# Patient Record
Sex: FEMALE | Race: WHITE | NOT HISPANIC OR LATINO | Employment: OTHER | URBAN - METROPOLITAN AREA
[De-identification: names, ages, dates, MRNs, and addresses within clinical notes are randomized per-mention and may not be internally consistent; named-entity substitution may affect disease eponyms.]

---

## 2017-01-03 ENCOUNTER — OFFICE VISIT (OUTPATIENT)
Dept: ENDOCRINOLOGY | Age: 62
End: 2017-01-03

## 2017-01-03 VITALS
HEIGHT: 61 IN | WEIGHT: 147.2 LBS | SYSTOLIC BLOOD PRESSURE: 112 MMHG | RESPIRATION RATE: 16 BRPM | BODY MASS INDEX: 27.79 KG/M2 | DIASTOLIC BLOOD PRESSURE: 72 MMHG

## 2017-01-03 DIAGNOSIS — IMO0002 UNCONTROLLED TYPE 1 DIABETES MELLITUS WITH OTHER SPECIFIED COMPLICATION: Primary | ICD-10-CM

## 2017-01-03 DIAGNOSIS — G72.3: ICD-10-CM

## 2017-01-03 DIAGNOSIS — E78.5 DYSLIPIDEMIA: ICD-10-CM

## 2017-01-03 DIAGNOSIS — I10 ESSENTIAL HYPERTENSION: ICD-10-CM

## 2017-01-03 DIAGNOSIS — R53.82 CHRONIC FATIGUE: ICD-10-CM

## 2017-01-03 DIAGNOSIS — E55.9 AVITAMINOSIS D: ICD-10-CM

## 2017-01-03 PROCEDURE — 99214 OFFICE O/P EST MOD 30 MIN: CPT | Performed by: INTERNAL MEDICINE

## 2017-01-03 NOTE — LETTER
January 3, 2017     No Known Provider  [facility] [place] [address]    Patient: Dayanna Lewis   YOB: 1955   Date of Visit: 1/3/2017       Dear  Provider:    Thank you for referring Dayanna Lewis to me for evaluation. Below are the relevant portions of my assessment and plan of care.      Diagnoses and all orders for this visit:    Uncontrolled type 1 diabetes mellitus with other specified complication  -     Vitamin D 25 Hydroxy; Future  -     Uric Acid; Future  -     T4 & TSH ([facility]); Future  -     Comprehensive Metabolic Panel; Future  -     Hemoglobin A1c; Future  -     Lipid Panel; Future  -     MicroAlbumin, Urine, Random; Future    Dyslipidemia  -     Vitamin D 25 Hydroxy; Future  -     Uric Acid; Future  -     T4 & TSH ([facility]); Future  -     Comprehensive Metabolic Panel; Future  -     Hemoglobin A1c; Future  -     Lipid Panel; Future  -     MicroAlbumin, Urine, Random; Future    Chronic fatigue  -     Vitamin D 25 Hydroxy; Future  -     Uric Acid; Future  -     T4 & TSH ([facility]); Future  -     Comprehensive Metabolic Panel; Future  -     Hemoglobin A1c; Future  -     Lipid Panel; Future  -     MicroAlbumin, Urine, Random; Future    Essential hypertension  -     Vitamin D 25 Hydroxy; Future  -     Uric Acid; Future  -     T4 & TSH ([facility]); Future  -     Comprehensive Metabolic Panel; Future  -     Hemoglobin A1c; Future  -     Lipid Panel; Future  -     MicroAlbumin, Urine, Random; Future    Avitaminosis D  -     Vitamin D 25 Hydroxy; Future  -     Uric Acid; Future  -     T4 & TSH ([facility]); Future  -     Comprehensive Metabolic Panel; Future  -     Hemoglobin A1c; Future  -     Lipid Panel; Future  -     MicroAlbumin, Urine, Random; Future    Adynamia  -     Vitamin D 25 Hydroxy; Future  -     Uric Acid; Future  -     T4 & TSH ([facility]); Future  -     Comprehensive Metabolic Panel; Future  -     Hemoglobin A1c; Future  -     Lipid Panel; Future  -     MicroAlbumin,  Urine, Random; Future                          If you have questions, please do not hesitate to call me. I look forward to following Dyaanna along with you.         Sincerely,        Donya Kaplan MD        CC: No Recipients

## 2017-01-03 NOTE — PROGRESS NOTES
Subjective   Dayanna Lewis is a 61 y.o. female seen for follow up for DM1, dyslipidemia, HTN, vit d deficiency, lab review. Patient is checking BG 3-4 times a day. She denies any problems or concerns. She would like to discuss taking Bystolic.      History of Present Illness this is a 61-year-old female with type I diabetes as well as hypertension and dyslipidemia with vitamin D deficiency.  Over the course of last 6 months she has had no significant health problems for which to go to the emergency room or hospital.    The following portions of the patient's history were reviewed and updated as appropriate: allergies, current medications, past family history, past medical history, past social history, past surgical history and problem list.    Review of Systems   Constitutional: Negative.    HENT: Negative.    Eyes: Negative.    Respiratory: Negative.    Cardiovascular: Negative.    Gastrointestinal: Negative.    Endocrine: Negative.    Genitourinary: Negative.    Musculoskeletal: Negative.    Skin: Negative.    Allergic/Immunologic: Negative.    Neurological: Negative.    Hematological: Negative.    Psychiatric/Behavioral: Negative.        Objective   Physical Exam   Constitutional: She is oriented to person, place, and time. She appears well-developed and well-nourished. No distress.   HENT:   Head: Normocephalic and atraumatic.   Right Ear: External ear normal.   Left Ear: External ear normal.   Nose: Nose normal.   Mouth/Throat: Oropharynx is clear and moist. No oropharyngeal exudate.   Eyes: Conjunctivae and EOM are normal. Pupils are equal, round, and reactive to light. Right eye exhibits no discharge. Left eye exhibits no discharge. No scleral icterus.   Neck: Normal range of motion. Neck supple. No JVD present. No tracheal deviation present. No thyromegaly present.   Cardiovascular: Normal rate, regular rhythm, normal heart sounds and intact distal pulses.  Exam reveals no gallop and no friction rub.    No  murmur heard.  Pulmonary/Chest: Effort normal and breath sounds normal. No stridor. No respiratory distress. She has no wheezes. She has no rales. She exhibits no tenderness.   Abdominal: Soft. Bowel sounds are normal. She exhibits no distension and no mass. There is no tenderness. There is no rebound and no guarding. No hernia.   Musculoskeletal: Normal range of motion. She exhibits no edema, tenderness or deformity.   Lymphadenopathy:     She has no cervical adenopathy.   Neurological: She is alert and oriented to person, place, and time. She has normal reflexes. She displays normal reflexes. No cranial nerve deficit. She exhibits normal muscle tone. Coordination normal.   Skin: Skin is warm and dry. No rash noted. She is not diaphoretic. No erythema. No pallor.   Psychiatric: She has a normal mood and affect. Her behavior is normal. Judgment and thought content normal.   Nursing note and vitals reviewed.        Assessment/Plan   Diagnoses and all orders for this visit:    Uncontrolled type 1 diabetes mellitus with other specified complication  -     Vitamin D 25 Hydroxy; Future  -     Uric Acid; Future  -     T4 & TSH ([facility]); Future  -     Comprehensive Metabolic Panel; Future  -     Hemoglobin A1c; Future  -     Lipid Panel; Future  -     MicroAlbumin, Urine, Random; Future    Dyslipidemia  -     Vitamin D 25 Hydroxy; Future  -     Uric Acid; Future  -     T4 & TSH ([facility]); Future  -     Comprehensive Metabolic Panel; Future  -     Hemoglobin A1c; Future  -     Lipid Panel; Future  -     MicroAlbumin, Urine, Random; Future    Chronic fatigue  -     Vitamin D 25 Hydroxy; Future  -     Uric Acid; Future  -     T4 & TSH ([facility]); Future  -     Comprehensive Metabolic Panel; Future  -     Hemoglobin A1c; Future  -     Lipid Panel; Future  -     MicroAlbumin, Urine, Random; Future    Essential hypertension  -     Vitamin D 25 Hydroxy; Future  -     Uric Acid; Future  -     T4 & TSH ([facility]); Future  -      Comprehensive Metabolic Panel; Future  -     Hemoglobin A1c; Future  -     Lipid Panel; Future  -     MicroAlbumin, Urine, Random; Future    Avitaminosis D  -     Vitamin D 25 Hydroxy; Future  -     Uric Acid; Future  -     T4 & TSH ([facility]); Future  -     Comprehensive Metabolic Panel; Future  -     Hemoglobin A1c; Future  -     Lipid Panel; Future  -     MicroAlbumin, Urine, Random; Future    Adynamia  -     Vitamin D 25 Hydroxy; Future  -     Uric Acid; Future  -     T4 & TSH ([facility]); Future  -     Comprehensive Metabolic Panel; Future  -     Hemoglobin A1c; Future  -     Lipid Panel; Future  -     MicroAlbumin, Urine, Random; Future               Anus summary I saw and examined this 61-year-old female for above-mentioned problems.  I reviewed her laboratory evaluation of 12/20/2016 and provided her with a hard copy of it.  Overall she is clinically and metabolically stable and therefore we will go ahead and continue all her current prescriptions.  She will see Ms. Pamela Najera in 4 months laboratory evaluation prior to each office visit.  Contrary to her [facility] and her lisinopril which is cheaper than by systolic has no demonstrated superiority over by Bystolic which she is on an as a result she shall remain on it moving forward.

## 2017-01-03 NOTE — LETTER
January 3, 2017     No Known Provider  [facility] [address]    Patient: Dayanna Lewis   YOB: 1955   Date of Visit: 1/3/2017       Dear  Provider:    Dayanna Lewis was in my office today. Below are the relevant portions of my assessment and plan of care.      Diagnoses and all orders for this visit:    Uncontrolled type 1 diabetes mellitus with other specified complication  -     Vitamin D 25 Hydroxy; Future  -     Uric Acid; Future  -     T4 & TSH ([facility]); Future  -     Comprehensive Metabolic Panel; Future  -     Hemoglobin A1c; Future  -     Lipid Panel; Future  -     MicroAlbumin, Urine, Random; Future    Dyslipidemia  -     Vitamin D 25 Hydroxy; Future  -     Uric Acid; Future  -     T4 & TSH ([facility]); Future  -     Comprehensive Metabolic Panel; Future  -     Hemoglobin A1c; Future  -     Lipid Panel; Future  -     MicroAlbumin, Urine, Random; Future    Chronic fatigue  -     Vitamin D 25 Hydroxy; Future  -     Uric Acid; Future  -     T4 & TSH ([facility]); Future  -     Comprehensive Metabolic Panel; Future  -     Hemoglobin A1c; Future  -     Lipid Panel; Future  -     MicroAlbumin, Urine, Random; Future    Essential hypertension  -     Vitamin D 25 Hydroxy; Future  -     Uric Acid; Future  -     T4 & TSH ([facility]); Future  -     Comprehensive Metabolic Panel; Future  -     Hemoglobin A1c; Future  -     Lipid Panel; Future  -     MicroAlbumin, Urine, Random; Future    Avitaminosis D  -     Vitamin D 25 Hydroxy; Future  -     Uric Acid; Future  -     T4 & TSH ([facility]); Future  -     Comprehensive Metabolic Panel; Future  -     Hemoglobin A1c; Future  -     Lipid Panel; Future  -     MicroAlbumin, Urine, Random; Future    Adynamia  -     Vitamin D 25 Hydroxy; Future  -     Uric Acid; Future  -     T4 & TSH ([facility]); Future  -     Comprehensive Metabolic Panel; Future  -     Hemoglobin A1c; Future  -     Lipid Panel; Future  -     MicroAlbumin, Urine, Random;  Future                  If you have questions, please do not hesitate to call me. I look forward to following Dayanna along with you.         Sincerely,        Donya Kaplan MD        CC: No Recipients

## 2017-03-16 ENCOUNTER — TELEPHONE (OUTPATIENT)
Dept: ENDOCRINOLOGY | Age: 62
End: 2017-03-16

## 2017-04-21 ENCOUNTER — LAB (OUTPATIENT)
Dept: ENDOCRINOLOGY | Age: 62
End: 2017-04-21

## 2017-04-21 DIAGNOSIS — IMO0002 UNCONTROLLED TYPE 1 DIABETES MELLITUS WITH OTHER SPECIFIED COMPLICATION: ICD-10-CM

## 2017-04-21 DIAGNOSIS — I10 ESSENTIAL HYPERTENSION: ICD-10-CM

## 2017-04-21 DIAGNOSIS — G72.3: ICD-10-CM

## 2017-04-21 DIAGNOSIS — E55.9 AVITAMINOSIS D: Primary | ICD-10-CM

## 2017-04-21 DIAGNOSIS — R53.82 CHRONIC FATIGUE: ICD-10-CM

## 2017-04-21 DIAGNOSIS — E55.9 AVITAMINOSIS D: ICD-10-CM

## 2017-04-21 DIAGNOSIS — E78.5 DYSLIPIDEMIA: ICD-10-CM

## 2017-04-21 LAB
ALBUMIN SERPL-MCNC: 4.1 G/DL (ref 3.5–5.2)
ALBUMIN/GLOB SERPL: 2 G/DL
ALP SERPL-CCNC: 71 U/L (ref 39–117)
ALT SERPL-CCNC: 20 U/L (ref 1–33)
AST SERPL-CCNC: 25 U/L (ref 1–32)
BILIRUB SERPL-MCNC: 0.4 MG/DL (ref 0.1–1.2)
BUN SERPL-MCNC: 18 MG/DL (ref 8–23)
BUN/CREAT SERPL: 12.9 (ref 7–25)
CALCIUM SERPL-MCNC: 9.5 MG/DL (ref 8.6–10.5)
CHLORIDE SERPL-SCNC: 104 MMOL/L (ref 98–107)
CHOLEST SERPL-MCNC: 130 MG/DL (ref 0–200)
CO2 SERPL-SCNC: 25.3 MMOL/L (ref 22–29)
CREAT SERPL-MCNC: 1.39 MG/DL (ref 0.57–1)
GLOBULIN SER CALC-MCNC: 2.1 GM/DL
GLUCOSE SERPL-MCNC: 124 MG/DL (ref 65–99)
HBA1C MFR BLD: 8.01 % (ref 4.8–5.6)
HDLC SERPL-MCNC: 77 MG/DL (ref 40–60)
LDLC SERPL CALC-MCNC: 37 MG/DL (ref 0–100)
POTASSIUM SERPL-SCNC: 3.6 MMOL/L (ref 3.5–5.2)
PROT SERPL-MCNC: 6.2 G/DL (ref 6–8.5)
SODIUM SERPL-SCNC: 143 MMOL/L (ref 136–145)
T4 SERPL-MCNC: 5.65 MCG/DL (ref 4.5–11.7)
TRIGL SERPL-MCNC: 78 MG/DL (ref 0–150)
TSH SERPL DL<=0.005 MIU/L-ACNC: 2.6 MIU/ML (ref 0.27–4.2)
URATE SERPL-MCNC: 4.4 MG/DL (ref 2.4–5.7)
VLDLC SERPL CALC-MCNC: 15.6 MG/DL (ref 5–40)

## 2017-04-22 LAB — 25(OH)D3+25(OH)D2 SERPL-MCNC: 54.3 NG/ML (ref 30–100)

## 2017-04-27 RX ORDER — NEBIVOLOL HYDROCHLORIDE 10 MG/1
TABLET ORAL
Qty: 90 TABLET | Refills: 0 | Status: SHIPPED | OUTPATIENT
Start: 2017-04-27 | End: 2017-08-02 | Stop reason: SDUPTHER

## 2017-05-05 ENCOUNTER — OFFICE VISIT (OUTPATIENT)
Dept: ENDOCRINOLOGY | Age: 62
End: 2017-05-05

## 2017-05-05 VITALS
DIASTOLIC BLOOD PRESSURE: 68 MMHG | HEIGHT: 62 IN | RESPIRATION RATE: 16 BRPM | SYSTOLIC BLOOD PRESSURE: 114 MMHG | WEIGHT: 150 LBS | BODY MASS INDEX: 27.6 KG/M2

## 2017-05-05 DIAGNOSIS — R53.82 CHRONIC FATIGUE: ICD-10-CM

## 2017-05-05 DIAGNOSIS — IMO0002 UNCONTROLLED TYPE 1 DIABETES MELLITUS WITH OTHER SPECIFIED COMPLICATION: Primary | ICD-10-CM

## 2017-05-05 DIAGNOSIS — I10 ESSENTIAL HYPERTENSION: ICD-10-CM

## 2017-05-05 DIAGNOSIS — E55.9 AVITAMINOSIS D: ICD-10-CM

## 2017-05-05 DIAGNOSIS — N28.9 RENAL INSUFFICIENCY: ICD-10-CM

## 2017-05-05 DIAGNOSIS — Z46.81 INSULIN PUMP TITRATION: ICD-10-CM

## 2017-05-05 DIAGNOSIS — E78.5 DYSLIPIDEMIA: ICD-10-CM

## 2017-05-05 PROCEDURE — 99214 OFFICE O/P EST MOD 30 MIN: CPT | Performed by: NURSE PRACTITIONER

## 2017-05-12 RX ORDER — EZETIMIBE 10 MG/1
TABLET ORAL
Qty: 90 TABLET | Refills: 0 | Status: SHIPPED | OUTPATIENT
Start: 2017-05-12 | End: 2017-09-25

## 2017-05-22 RX ORDER — ROSUVASTATIN CALCIUM 10 MG/1
TABLET, COATED ORAL
Qty: 90 TABLET | Refills: 0 | Status: SHIPPED | OUTPATIENT
Start: 2017-05-22 | End: 2017-08-16 | Stop reason: SDUPTHER

## 2017-06-05 ENCOUNTER — OFFICE VISIT (OUTPATIENT)
Dept: OBSTETRICS AND GYNECOLOGY | Facility: CLINIC | Age: 62
End: 2017-06-05

## 2017-06-05 VITALS
SYSTOLIC BLOOD PRESSURE: 118 MMHG | HEART RATE: 68 BPM | HEIGHT: 62 IN | DIASTOLIC BLOOD PRESSURE: 67 MMHG | BODY MASS INDEX: 27.71 KG/M2 | WEIGHT: 150.6 LBS

## 2017-06-05 DIAGNOSIS — E55.9 AVITAMINOSIS D: ICD-10-CM

## 2017-06-05 DIAGNOSIS — M81.0 OSTEOPOROSIS: ICD-10-CM

## 2017-06-05 DIAGNOSIS — R53.82 CHRONIC FATIGUE: ICD-10-CM

## 2017-06-05 DIAGNOSIS — Z01.419 WELL WOMAN EXAM WITH ROUTINE GYNECOLOGICAL EXAM: ICD-10-CM

## 2017-06-05 DIAGNOSIS — I10 ESSENTIAL HYPERTENSION: ICD-10-CM

## 2017-06-05 DIAGNOSIS — E78.5 DYSLIPIDEMIA: ICD-10-CM

## 2017-06-05 DIAGNOSIS — Z12.31 VISIT FOR SCREENING MAMMOGRAM: ICD-10-CM

## 2017-06-05 DIAGNOSIS — Z46.81 INSULIN PUMP TITRATION: ICD-10-CM

## 2017-06-05 DIAGNOSIS — Z78.0 MENOPAUSE: ICD-10-CM

## 2017-06-05 DIAGNOSIS — Z12.4 ROUTINE CERVICAL SMEAR: Primary | ICD-10-CM

## 2017-06-05 PROCEDURE — 99396 PREV VISIT EST AGE 40-64: CPT | Performed by: OBSTETRICS & GYNECOLOGY

## 2017-06-05 RX ORDER — RALOXIFENE HYDROCHLORIDE 60 MG/1
TABLET, FILM COATED ORAL
Qty: 90 TABLET | Refills: 12 | Status: SHIPPED | OUTPATIENT
Start: 2017-06-05 | End: 2017-09-25 | Stop reason: SDUPTHER

## 2017-06-05 RX ORDER — RALOXIFENE HYDROCHLORIDE 60 MG/1
60 TABLET, FILM COATED ORAL DAILY
Qty: 30 TABLET | Refills: 12 | Status: SHIPPED | OUTPATIENT
Start: 2017-06-05 | End: 2017-06-05 | Stop reason: SDUPTHER

## 2017-06-05 RX ORDER — BUPROPION HYDROCHLORIDE 300 MG/1
TABLET ORAL
Qty: 90 TABLET | Refills: 0 | Status: SHIPPED | OUTPATIENT
Start: 2017-06-05 | End: 2017-09-04 | Stop reason: SDUPTHER

## 2017-06-05 RX ORDER — TOBRAMYCIN 3 MG/ML
SOLUTION/ DROPS OPHTHALMIC
Refills: 0 | COMMUNITY
Start: 2017-06-02 | End: 2018-01-25

## 2017-06-05 NOTE — PROGRESS NOTES
Subjective   Dayanna Lewis is a 61 y.o. female     Cc: Annual and pap    Last annual 2016  Last pap 2013  Last mammogram 2016  Last colonoscopy approximately 8 years ago, no polyps.       History of Present Illness: reports no gyn concerns or problems.  Will schedule mammography.  Doing well with Evista.    The following portions of the patient's history were reviewed and updated as appropriate: allergies, current medications, past family history, past medical history, past social history, past surgical history and problem list.    Review of Systems   Constitutional: Negative for fatigue and fever.   HENT: Negative for congestion.    Eyes: Negative for visual disturbance.   Respiratory: Negative for chest tightness and shortness of breath.    Cardiovascular: Negative for chest pain, palpitations and leg swelling.   Gastrointestinal: Negative for abdominal pain and blood in stool.   Endocrine: Negative for heat intolerance.   Genitourinary: Negative for difficulty urinating, dysuria, frequency, genital sores, hematuria, menstrual problem, pelvic pain, urgency, vaginal bleeding, vaginal discharge and vaginal pain.   Musculoskeletal: Negative for back pain.   Skin: Negative for color change and rash.   Neurological: Negative for syncope and headaches.   Psychiatric/Behavioral: Negative for sleep disturbance.       Past Medical History:   Diagnosis Date   • Dermatomyositis     TYPE 2   • Diabetes mellitus type 1, uncontrolled    • Dyslipidemia    • Hypertension    • Menopausal disorder    • Osteoporosis    • Polymyositis    • Vitamin D deficiency      Past Surgical History:   Procedure Laterality Date   •  SECTION     • INNER EAR SURGERY     • KNEE SURGERY     • OTHER SURGICAL HISTORY      EAR SURGERY     OB History      Para Term  AB TAB SAB Ectopic Multiple Living    2 2        2        Menstrual History:  OB History      Para Term  AB TAB SAB Ectopic  Multiple Living    2 2        2         No LMP recorded (lmp unknown). Patient is postmenopausal.       Family History   Problem Relation Age of Onset   • Depression Mother    • Glaucoma Mother    • Diabetes Father    • Glaucoma Father    • Hypertension Father    • Diabetes Sister    • Thyroid disease Sister    • Diabetes Brother      History   Smoking Status   • Never Smoker   Smokeless Tobacco   • Not on file     History   Alcohol Use No       Objective   Physical Exam   Constitutional: She is oriented to person, place, and time. She appears well-developed and well-nourished.   HENT:   Head: Normocephalic and atraumatic.   Right Ear: External ear normal.   Left Ear: External ear normal.   Nose: Nose normal.   Eyes: EOM are normal. Pupils are equal, round, and reactive to light.   Neck: Normal range of motion. Neck supple. No thyromegaly present.   Cardiovascular: Normal rate, regular rhythm and normal heart sounds.    No murmur heard.  Pulmonary/Chest: Effort normal and breath sounds normal. She has no wheezes. She has no rales. She exhibits no tenderness. Right breast exhibits no inverted nipple, no mass, no nipple discharge, no skin change and no tenderness. Left breast exhibits no inverted nipple, no mass, no nipple discharge, no skin change and no tenderness. There is no breast swelling.   Abdominal: Soft. Bowel sounds are normal. She exhibits no distension and no mass. There is no tenderness. Hernia confirmed negative in the right inguinal area and confirmed negative in the left inguinal area.   Genitourinary: Uterus normal. No breast tenderness. Pelvic exam was performed with patient supine. There is no rash, tenderness or lesion on the right labia. There is no rash, tenderness or lesion on the left labia. Cervix exhibits no motion tenderness, no discharge and no friability. Right adnexum displays no mass and no tenderness. Left adnexum displays no mass and no tenderness. No erythema, tenderness or bleeding  in the vagina. No vaginal discharge found.   Musculoskeletal: Normal range of motion. She exhibits no edema.   Neurological: She is alert and oriented to person, place, and time.   Skin: Skin is warm and dry. No rash noted.   Psychiatric: She has a normal mood and affect. Judgment normal.   Nursing note and vitals reviewed.        Assessment/Plan   Dayanna was seen today for annual exam.    Diagnoses and all orders for this visit:    Routine cervical smear  -     Pap Lb, Rfx HPV ASCU    Essential hypertension  -         Avitaminosis D  -       Dyslipidemia  -         Chronic fatigue  -         Insulin pump titration  -       Well woman exam with routine gynecological exam    Visit for screening mammogram  -     Mammo Screening Bilateral With CAD; Future    Menopause    Osteoporosis  - Continue with Evista 60mg q daily.        Will continue with q o yr WWE's, annual mammography, SBE's, and daily calcium + D

## 2017-06-07 LAB
CONV .: NORMAL
CYTOLOGIST CVX/VAG CYTO: NORMAL
DX ICD CODE: NORMAL
HIV 1 & 2 AB SER-IMP: NORMAL
OTHER STN SPEC: NORMAL
PATH REPORT.FINAL DX SPEC: NORMAL
STAT OF ADQ CVX/VAG CYTO-IMP: NORMAL

## 2017-06-19 RX ORDER — FOLIC ACID 1 MG/1
TABLET ORAL
Qty: 90 TABLET | Refills: 0 | Status: SHIPPED | OUTPATIENT
Start: 2017-06-19 | End: 2017-09-18 | Stop reason: SDUPTHER

## 2017-06-21 RX ORDER — INSULIN GLARGINE 100 [IU]/ML
INJECTION, SOLUTION SUBCUTANEOUS
Qty: 10 ML | Refills: 0 | Status: SHIPPED | OUTPATIENT
Start: 2017-06-21 | End: 2018-10-10 | Stop reason: SDUPTHER

## 2017-06-28 RX ORDER — ERGOCALCIFEROL 1.25 MG/1
CAPSULE ORAL
Qty: 13 CAPSULE | Refills: 0 | Status: SHIPPED | OUTPATIENT
Start: 2017-06-28 | End: 2017-09-25 | Stop reason: SDUPTHER

## 2017-08-02 RX ORDER — NEBIVOLOL HYDROCHLORIDE 10 MG/1
TABLET ORAL
Qty: 90 TABLET | Refills: 0 | Status: SHIPPED | OUTPATIENT
Start: 2017-08-02 | End: 2017-09-25 | Stop reason: SDUPTHER

## 2017-08-15 RX ORDER — EZETIMIBE 10 MG/1
TABLET ORAL
Qty: 90 TABLET | Refills: 0 | Status: SHIPPED | OUTPATIENT
Start: 2017-08-15 | End: 2017-09-25 | Stop reason: SDUPTHER

## 2017-08-16 RX ORDER — ROSUVASTATIN CALCIUM 10 MG/1
TABLET, COATED ORAL
Qty: 90 TABLET | Refills: 0 | Status: SHIPPED | OUTPATIENT
Start: 2017-08-16 | End: 2017-09-25 | Stop reason: SDUPTHER

## 2017-08-22 ENCOUNTER — APPOINTMENT (OUTPATIENT)
Dept: WOMENS IMAGING | Facility: HOSPITAL | Age: 62
End: 2017-08-22

## 2017-08-22 PROCEDURE — G0202 SCR MAMMO BI INCL CAD: HCPCS | Performed by: RADIOLOGY

## 2017-08-22 PROCEDURE — 77063 BREAST TOMOSYNTHESIS BI: CPT | Performed by: RADIOLOGY

## 2017-09-05 DIAGNOSIS — E55.9 VITAMIN D DEFICIENCY: ICD-10-CM

## 2017-09-05 DIAGNOSIS — IMO0002 UNCONTROLLED TYPE 1 DIABETES MELLITUS WITH OTHER SPECIFIED COMPLICATION: Primary | ICD-10-CM

## 2017-09-05 PROBLEM — Z78.0 MENOPAUSE: Status: ACTIVE | Noted: 2017-09-05

## 2017-09-05 PROBLEM — E16.2 HYPOGLYCEMIA: Status: ACTIVE | Noted: 2017-09-05

## 2017-09-05 PROBLEM — M81.0 OSTEOPOROSIS: Status: ACTIVE | Noted: 2017-09-05

## 2017-09-05 RX ORDER — BUPROPION HYDROCHLORIDE 300 MG/1
TABLET ORAL
Qty: 90 TABLET | Refills: 0 | Status: SHIPPED | OUTPATIENT
Start: 2017-09-05 | End: 2017-12-18 | Stop reason: SDUPTHER

## 2017-09-12 ENCOUNTER — LAB (OUTPATIENT)
Dept: ENDOCRINOLOGY | Age: 62
End: 2017-09-12

## 2017-09-12 DIAGNOSIS — E55.9 VITAMIN D DEFICIENCY: ICD-10-CM

## 2017-09-12 DIAGNOSIS — IMO0002 UNCONTROLLED TYPE 1 DIABETES MELLITUS WITH OTHER SPECIFIED COMPLICATION: ICD-10-CM

## 2017-09-13 LAB
25(OH)D3+25(OH)D2 SERPL-MCNC: 52.2 NG/ML (ref 30–100)
ALBUMIN SERPL-MCNC: 4 G/DL (ref 3.5–5.2)
ALBUMIN/GLOB SERPL: 1.9 G/DL
ALP SERPL-CCNC: 81 U/L (ref 39–117)
ALT SERPL-CCNC: 23 U/L (ref 1–33)
AST SERPL-CCNC: 26 U/L (ref 1–32)
BILIRUB SERPL-MCNC: 0.3 MG/DL (ref 0.1–1.2)
BUN SERPL-MCNC: 17 MG/DL (ref 8–23)
BUN/CREAT SERPL: 14.4 (ref 7–25)
CALCIUM SERPL-MCNC: 9.3 MG/DL (ref 8.6–10.5)
CHLORIDE SERPL-SCNC: 102 MMOL/L (ref 98–107)
CHOLEST SERPL-MCNC: 122 MG/DL (ref 0–200)
CO2 SERPL-SCNC: 26.7 MMOL/L (ref 22–29)
CREAT SERPL-MCNC: 1.18 MG/DL (ref 0.57–1)
GLOBULIN SER CALC-MCNC: 2.1 GM/DL
GLUCOSE SERPL-MCNC: 217 MG/DL (ref 65–99)
HBA1C MFR BLD: 7.7 % (ref 4.8–5.6)
HDLC SERPL-MCNC: 70 MG/DL (ref 40–60)
LDLC SERPL CALC-MCNC: 31 MG/DL (ref 0–100)
MICROALBUMIN UR-MCNC: <3 UG/ML
POTASSIUM SERPL-SCNC: 4.7 MMOL/L (ref 3.5–5.2)
PROT SERPL-MCNC: 6.1 G/DL (ref 6–8.5)
SODIUM SERPL-SCNC: 141 MMOL/L (ref 136–145)
TRIGL SERPL-MCNC: 103 MG/DL (ref 0–150)
VLDLC SERPL CALC-MCNC: 20.6 MG/DL (ref 5–40)

## 2017-09-18 RX ORDER — FOLIC ACID 1 MG/1
TABLET ORAL
Qty: 90 TABLET | Refills: 0 | Status: SHIPPED | OUTPATIENT
Start: 2017-09-18 | End: 2017-12-18 | Stop reason: SDUPTHER

## 2017-09-25 ENCOUNTER — OFFICE VISIT (OUTPATIENT)
Dept: ENDOCRINOLOGY | Age: 62
End: 2017-09-25

## 2017-09-25 VITALS
DIASTOLIC BLOOD PRESSURE: 70 MMHG | BODY MASS INDEX: 29.22 KG/M2 | WEIGHT: 157.2 LBS | RESPIRATION RATE: 16 BRPM | SYSTOLIC BLOOD PRESSURE: 122 MMHG

## 2017-09-25 DIAGNOSIS — R53.82 CHRONIC FATIGUE: ICD-10-CM

## 2017-09-25 DIAGNOSIS — I10 ESSENTIAL HYPERTENSION: ICD-10-CM

## 2017-09-25 DIAGNOSIS — Z78.0 MENOPAUSE: ICD-10-CM

## 2017-09-25 DIAGNOSIS — Z46.81 INSULIN PUMP TITRATION: ICD-10-CM

## 2017-09-25 DIAGNOSIS — E55.9 AVITAMINOSIS D: ICD-10-CM

## 2017-09-25 DIAGNOSIS — E78.5 DYSLIPIDEMIA: ICD-10-CM

## 2017-09-25 DIAGNOSIS — E55.9 VITAMIN D DEFICIENCY: ICD-10-CM

## 2017-09-25 DIAGNOSIS — IMO0002 UNCONTROLLED TYPE 1 DIABETES MELLITUS WITH OTHER SPECIFIED COMPLICATION: Primary | ICD-10-CM

## 2017-09-25 PROCEDURE — 99215 OFFICE O/P EST HI 40 MIN: CPT | Performed by: INTERNAL MEDICINE

## 2017-09-25 RX ORDER — EZETIMIBE 10 MG/1
10 TABLET ORAL DAILY
Qty: 90 TABLET | Refills: 3 | Status: SHIPPED | OUTPATIENT
Start: 2017-09-25 | End: 2018-01-25 | Stop reason: SDUPTHER

## 2017-09-25 RX ORDER — ERGOCALCIFEROL 1.25 MG/1
50000 CAPSULE ORAL
Qty: 13 CAPSULE | Refills: 3 | Status: SHIPPED | OUTPATIENT
Start: 2017-09-25 | End: 2018-07-25 | Stop reason: SDUPTHER

## 2017-09-25 RX ORDER — ROSUVASTATIN CALCIUM 10 MG/1
10 TABLET, COATED ORAL NIGHTLY
Qty: 90 TABLET | Refills: 3 | Status: SHIPPED | OUTPATIENT
Start: 2017-09-25 | End: 2018-04-25

## 2017-09-25 RX ORDER — NEBIVOLOL HYDROCHLORIDE 10 MG/1
10 TABLET ORAL DAILY
Qty: 90 TABLET | Refills: 3 | Status: SHIPPED | OUTPATIENT
Start: 2017-09-25 | End: 2018-01-25 | Stop reason: SDUPTHER

## 2017-09-25 RX ORDER — RALOXIFENE HYDROCHLORIDE 60 MG/1
60 TABLET, FILM COATED ORAL DAILY
Qty: 90 TABLET | Refills: 3 | Status: SHIPPED | OUTPATIENT
Start: 2017-09-25 | End: 2018-06-29 | Stop reason: SDUPTHER

## 2017-09-25 NOTE — PROGRESS NOTES
Subjective   Dayanna Lewis is a 61 y.o. female seen for follow up for DM1, HTN, dyslipidemia, osteoporosis, vit d deficiency, lab review. Patient is checking BG 3-4 times a day. She is currently using a Medtronic insulin pump. She would like to discuss her BG and having highs and multiple lows. She denies any problems or concerns.     History of Present Illness this is a 61-year-old female known patient with type I diabetes hypertension and dyslipidemia as well as osteoporosis and vitamin D deficiency.  Over the course of last 6 months she has had no significant health problems for which to go to the emergency room or hospital however she is complaining of having multiple low blood sugars followed by highs.    /70  Resp 16  Wt 157 lb 3.2 oz (71.3 kg)  LMP  (LMP Unknown)  BMI 29.22 kg/m2     No Known Allergies    Current Outpatient Prescriptions:   •  acetone, urine, test strip, Use as directed, Disp: 50 each, Rfl: 1  •  aspirin 81 MG tablet, Take 1 tablet by mouth daily., Disp: , Rfl:   •  buPROPion XL (WELLBUTRIN XL) 300 MG 24 hr tablet, TAKE 1 TABLET BY MOUTH EVERY MORNING, Disp: 90 tablet, Rfl: 0  •  BYSTOLIC 10 MG tablet, TAKE 1 TABLET BY MOUTH DAILY, Disp: 90 tablet, Rfl: 0  •  folic acid (FOLVITE) 1 MG tablet, TAKE 1 TABLET BY MOUTH DAILY, Disp: 90 tablet, Rfl: 0  •  glucagon (GLUCAGON EMERGENCY) 1 MG injection, Use as directed, Disp: 2 kit, Rfl: 1  •  glucose blood test strip, Mari Contour Next Test In Vitro Strip; Patient Sig: Mari Contour Next Test In Vitro Strip test 8 times daily; 240; 5; 10-Nov-2014; Active, Disp: , Rfl:   •  hydrochlorothiazide (HYDRODIURIL) 25 MG tablet, TK 1/2 TO 1 T PO QD, Disp: , Rfl: 0  •  insulin glargine (LANTUS) 100 UNIT/ML injection, Inject  under the skin daily. Use as needed, Disp: , Rfl:   •  insulin glulisine (APIDRA) 100 UNIT/ML injection, Inject  under the skin 3 (Three) Times a Day Before Meals., Disp: , Rfl:   •  LANTUS 100 UNIT/ML injection, INJECT 21 UNITS  UNDER THE SKIN EVERY DAY., Disp: 10 mL, Rfl: 0  •  Multiple Minerals-Vitamins (CITRACAL PLUS BONE DENSITY) tablet, Take by mouth., Disp: , Rfl:   •  raloxifene (EVISTA) 60 MG tablet, TAKE 1 TABLET BY MOUTH DAILY, Disp: 90 tablet, Rfl: 12  •  rosuvastatin (CRESTOR) 10 MG tablet, TAKE 1 TABLET BY MOUTH DAILY, Disp: 90 tablet, Rfl: 0  •  tobramycin 0.3 % solution ophthalmic solution, INT 2 GTS IN OU QID, Disp: , Rfl: 0  •  vitamin D (ERGOCALCIFEROL) 84523 UNITS capsule capsule, TAKE 1 CAPSULE BY MOUTH EVERY 7 DAYS, Disp: 13 capsule, Rfl: 0  •  vitamin E 400 UNIT capsule, Take 1 capsule by mouth daily., Disp: , Rfl:   •  ZETIA 10 MG tablet, TAKE 1 TABLET BY MOUTH EVERY DAY., Disp: 90 tablet, Rfl: 0  •  ZETIA 10 MG tablet, TAKE 1 TABLET BY MOUTH EVERY DAY., Disp: 90 tablet, Rfl: 0      The following portions of the patient's history were reviewed and updated as appropriate: allergies, current medications, past family history, past medical history, past social history, past surgical history and problem list.    Review of Systems   Constitutional: Negative.    HENT: Negative.    Eyes: Negative.    Respiratory: Negative.    Cardiovascular: Negative.    Gastrointestinal: Negative.    Endocrine: Negative.    Genitourinary: Negative.    Musculoskeletal: Negative.    Skin: Negative.    Allergic/Immunologic: Negative.    Neurological: Negative.    Hematological: Negative.    Psychiatric/Behavioral: Negative.        Objective   Physical Exam   Constitutional: She is oriented to person, place, and time. She appears well-developed and well-nourished. No distress.   HENT:   Head: Normocephalic and atraumatic.   Right Ear: External ear normal.   Left Ear: External ear normal.   Nose: Nose normal.   Mouth/Throat: Oropharynx is clear and moist. No oropharyngeal exudate.   Eyes: Conjunctivae and EOM are normal. Pupils are equal, round, and reactive to light. Right eye exhibits no discharge. Left eye exhibits no discharge. No scleral  icterus.   Neck: Normal range of motion. Neck supple. No JVD present. No tracheal deviation present. No thyromegaly present.   Cardiovascular: Normal rate, regular rhythm, normal heart sounds and intact distal pulses.  Exam reveals no gallop and no friction rub.    No murmur heard.  Pulmonary/Chest: Effort normal and breath sounds normal. No stridor. No respiratory distress. She has no wheezes. She has no rales. She exhibits no tenderness.   Abdominal: Soft. Bowel sounds are normal. She exhibits no distension and no mass. There is no tenderness. There is no rebound and no guarding. No hernia.   Musculoskeletal: Normal range of motion. She exhibits no edema, tenderness or deformity.   Lymphadenopathy:     She has no cervical adenopathy.   Neurological: She is alert and oriented to person, place, and time. She has normal reflexes. She displays normal reflexes. No cranial nerve deficit. She exhibits normal muscle tone. Coordination normal.   Skin: Skin is warm and dry. No rash noted. She is not diaphoretic. No erythema. No pallor.   Psychiatric: She has a normal mood and affect. Her behavior is normal. Judgment and thought content normal.   Nursing note and vitals reviewed.    Results for orders placed or performed in visit on 09/12/17   Comprehensive Metabolic Panel   Result Value Ref Range    Glucose 217 (H) 65 - 99 mg/dL    BUN 17 8 - 23 mg/dL    Creatinine 1.18 (H) 0.57 - 1.00 mg/dL    eGFR Non African Am 47 (L) >60 mL/min/1.73    eGFR African Am 57 (L) >60 mL/min/1.73    BUN/Creatinine Ratio 14.4 7.0 - 25.0    Sodium 141 136 - 145 mmol/L    Potassium 4.7 3.5 - 5.2 mmol/L    Chloride 102 98 - 107 mmol/L    Total CO2 26.7 22.0 - 29.0 mmol/L    Calcium 9.3 8.6 - 10.5 mg/dL    Total Protein 6.1 6.0 - 8.5 g/dL    Albumin 4.00 3.50 - 5.20 g/dL    Globulin 2.1 gm/dL    A/G Ratio 1.9 g/dL    Total Bilirubin 0.3 0.1 - 1.2 mg/dL    Alkaline Phosphatase 81 39 - 117 U/L    AST (SGOT) 26 1 - 32 U/L    ALT (SGPT) 23 1 - 33 U/L    Lipid Panel   Result Value Ref Range    Total Cholesterol 122 0 - 200 mg/dL    Triglycerides 103 0 - 150 mg/dL    HDL Cholesterol 70 (H) 40 - 60 mg/dL    VLDL Cholesterol 20.6 5 - 40 mg/dL    LDL Cholesterol  31 0 - 100 mg/dL   Vitamin D 25 Hydroxy   Result Value Ref Range    25 Hydroxy, Vitamin D 52.2 30.0 - 100.0 ng/mL   Hemoglobin A1c   Result Value Ref Range    Hemoglobin A1C 7.70 (H) 4.80 - 5.60 %   MicroAlbumin, Urine, Random   Result Value Ref Range    Microalbumin, Urine <3.0 Not Estab. ug/mL         Assessment/Plan   Diagnoses and all orders for this visit:    Uncontrolled type 1 diabetes mellitus with other specified complication  -     T4 & TSH ([facility]); Future  -     Uric Acid; Future  -     Vitamin D 25 Hydroxy; Future  -     Comprehensive Metabolic Panel; Future  -     Hemoglobin A1c; Future  -     Lipid Panel; Future  -     MicroAlbumin, Urine, Random; Future    Essential hypertension  -     T4 & TSH ([facility]); Future  -     Uric Acid; Future  -     Vitamin D 25 Hydroxy; Future  -     Comprehensive Metabolic Panel; Future  -     Hemoglobin A1c; Future  -     Lipid Panel; Future  -     MicroAlbumin, Urine, Random; Future  -     raloxifene (EVISTA) 60 MG tablet; Take 1 tablet by mouth Daily.    Dyslipidemia  -     T4 & TSH ([facility]); Future  -     Uric Acid; Future  -     Vitamin D 25 Hydroxy; Future  -     Comprehensive Metabolic Panel; Future  -     Hemoglobin A1c; Future  -     Lipid Panel; Future  -     MicroAlbumin, Urine, Random; Future  -     raloxifene (EVISTA) 60 MG tablet; Take 1 tablet by mouth Daily.    Chronic fatigue  -     T4 & TSH ([facility]); Future  -     Uric Acid; Future  -     Vitamin D 25 Hydroxy; Future  -     Comprehensive Metabolic Panel; Future  -     Hemoglobin A1c; Future  -     Lipid Panel; Future  -     MicroAlbumin, Urine, Random; Future  -     raloxifene (EVISTA) 60 MG tablet; Take 1 tablet by mouth Daily.    Vitamin D deficiency  -     T4 & TSH ([facility]); Future  -      Uric Acid; Future  -     Vitamin D 25 Hydroxy; Future  -     Comprehensive Metabolic Panel; Future  -     Hemoglobin A1c; Future  -     Lipid Panel; Future  -     MicroAlbumin, Urine, Random; Future    Menopause  -     T4 & TSH ([facility]); Future  -     Uric Acid; Future  -     Vitamin D 25 Hydroxy; Future  -     Comprehensive Metabolic Panel; Future  -     Hemoglobin A1c; Future  -     Lipid Panel; Future  -     MicroAlbumin, Urine, Random; Future    Insulin pump titration  -     raloxifene (EVISTA) 60 MG tablet; Take 1 tablet by mouth Daily.    Avitaminosis D  -     raloxifene (EVISTA) 60 MG tablet; Take 1 tablet by mouth Daily.    Other orders  -     ezetimibe (ZETIA) 10 MG tablet; Take 1 tablet by mouth Daily.  -     vitamin D (ERGOCALCIFEROL) 78647 units capsule capsule; Take 1 capsule by mouth Every 7 (Seven) Days.  -     rosuvastatin (CRESTOR) 10 MG tablet; Take 1 tablet by mouth Every Night.  -     BYSTOLIC 10 MG tablet; Take 1 tablet by mouth Daily.  -     insulin glulisine (APIDRA) 100 UNIT/ML injection; Used in an insulin pump up to 100 units daily               In summary I saw and examined this 61-year-old female for above-mentioned problems.  I reviewed her laboratory evaluation of 09/12/2017 and provided her with a hard copy of it.  We also reviewed the records and the downloads of her Medtronic pump and reviewed her basal and bolus doses of insulin and at this time for the ease of understanding and preventing hypoglycemia episodes will change all her basals from 12 AM to 3 PM to 1.0 units per hour and from 3 PM to 12 midnight to 1.2 units per hour.  Overall she is clinically and metabolically stable and therefore we will go ahead and continue all her current prescriptions.  This office visit including patient counseling as well as insulin pump management and review which occupied greater than 50% of the time exceeded 40 minutes.  She will see Ms. Pamela Najera in 4 months or sooner if needed with  laboratory evaluation prior to each office visit.

## 2017-09-26 RX ORDER — ERGOCALCIFEROL 1.25 MG/1
CAPSULE ORAL
Qty: 13 CAPSULE | Refills: 0 | Status: SHIPPED | OUTPATIENT
Start: 2017-09-26 | End: 2018-01-25 | Stop reason: SDUPTHER

## 2017-10-30 RX ORDER — NEBIVOLOL HYDROCHLORIDE 10 MG/1
TABLET ORAL
Qty: 90 TABLET | Refills: 0 | Status: SHIPPED | OUTPATIENT
Start: 2017-10-30 | End: 2018-01-25 | Stop reason: SDUPTHER

## 2017-11-20 RX ORDER — EZETIMIBE 10 MG/1
TABLET ORAL
Qty: 90 TABLET | Refills: 0 | Status: SHIPPED | OUTPATIENT
Start: 2017-11-20 | End: 2018-03-12

## 2017-12-18 RX ORDER — FOLIC ACID 1 MG/1
TABLET ORAL
Qty: 90 TABLET | Refills: 0 | Status: SHIPPED | OUTPATIENT
Start: 2017-12-18 | End: 2018-03-22 | Stop reason: SDUPTHER

## 2017-12-18 RX ORDER — BUPROPION HYDROCHLORIDE 300 MG/1
TABLET ORAL
Qty: 90 TABLET | Refills: 0 | Status: SHIPPED | OUTPATIENT
Start: 2017-12-18 | End: 2018-03-14 | Stop reason: SDUPTHER

## 2018-01-10 ENCOUNTER — LAB (OUTPATIENT)
Dept: ENDOCRINOLOGY | Age: 63
End: 2018-01-10

## 2018-01-10 DIAGNOSIS — E55.9 VITAMIN D DEFICIENCY: ICD-10-CM

## 2018-01-10 DIAGNOSIS — E78.5 DYSLIPIDEMIA: ICD-10-CM

## 2018-01-10 DIAGNOSIS — I10 ESSENTIAL HYPERTENSION: ICD-10-CM

## 2018-01-10 DIAGNOSIS — R53.82 CHRONIC FATIGUE: ICD-10-CM

## 2018-01-10 DIAGNOSIS — Z78.0 MENOPAUSE: ICD-10-CM

## 2018-01-10 DIAGNOSIS — IMO0002 UNCONTROLLED TYPE 1 DIABETES MELLITUS WITH OTHER SPECIFIED COMPLICATION: ICD-10-CM

## 2018-01-11 LAB
25(OH)D3+25(OH)D2 SERPL-MCNC: 58.1 NG/ML (ref 30–100)
ALBUMIN SERPL-MCNC: 4.2 G/DL (ref 3.5–5.2)
ALBUMIN/GLOB SERPL: 1.7 G/DL
ALP SERPL-CCNC: 89 U/L (ref 39–117)
ALT SERPL-CCNC: 19 U/L (ref 1–33)
AST SERPL-CCNC: 22 U/L (ref 1–32)
BILIRUB SERPL-MCNC: 0.3 MG/DL (ref 0.1–1.2)
BUN SERPL-MCNC: 15 MG/DL (ref 8–23)
BUN/CREAT SERPL: 11.5 (ref 7–25)
CALCIUM SERPL-MCNC: 9.6 MG/DL (ref 8.6–10.5)
CHLORIDE SERPL-SCNC: 101 MMOL/L (ref 98–107)
CHOLEST SERPL-MCNC: 129 MG/DL (ref 0–200)
CO2 SERPL-SCNC: 31.2 MMOL/L (ref 22–29)
CREAT SERPL-MCNC: 1.3 MG/DL (ref 0.57–1)
GLOBULIN SER CALC-MCNC: 2.5 GM/DL
GLUCOSE SERPL-MCNC: 167 MG/DL (ref 65–99)
HBA1C MFR BLD: 8.2 % (ref 4.8–5.6)
HDLC SERPL-MCNC: 70 MG/DL (ref 40–60)
INTERPRETATION: NORMAL
LDLC SERPL CALC-MCNC: 46 MG/DL (ref 0–100)
MICROALBUMIN UR-MCNC: 5.6 UG/ML
POTASSIUM SERPL-SCNC: 4.1 MMOL/L (ref 3.5–5.2)
PROT SERPL-MCNC: 6.7 G/DL (ref 6–8.5)
SODIUM SERPL-SCNC: 143 MMOL/L (ref 136–145)
T4 SERPL-MCNC: 5.97 MCG/DL (ref 4.5–11.7)
TRIGL SERPL-MCNC: 67 MG/DL (ref 0–150)
TSH SERPL DL<=0.005 MIU/L-ACNC: 2.56 MIU/ML (ref 0.27–4.2)
URATE SERPL-MCNC: 4.7 MG/DL (ref 2.4–5.7)
VLDLC SERPL CALC-MCNC: 13.4 MG/DL (ref 5–40)

## 2018-01-25 ENCOUNTER — OFFICE VISIT (OUTPATIENT)
Dept: ENDOCRINOLOGY | Age: 63
End: 2018-01-25

## 2018-01-25 VITALS
HEIGHT: 62 IN | BODY MASS INDEX: 28.71 KG/M2 | DIASTOLIC BLOOD PRESSURE: 82 MMHG | WEIGHT: 156 LBS | SYSTOLIC BLOOD PRESSURE: 122 MMHG

## 2018-01-25 DIAGNOSIS — IMO0002 UNCONTROLLED TYPE 1 DIABETES MELLITUS WITH OTHER SPECIFIED COMPLICATION: ICD-10-CM

## 2018-01-25 DIAGNOSIS — E55.9 VITAMIN D DEFICIENCY: ICD-10-CM

## 2018-01-25 DIAGNOSIS — Z46.81 INSULIN PUMP TITRATION: ICD-10-CM

## 2018-01-25 DIAGNOSIS — N28.9 RENAL INSUFFICIENCY: ICD-10-CM

## 2018-01-25 DIAGNOSIS — I10 ESSENTIAL HYPERTENSION: Primary | ICD-10-CM

## 2018-01-25 DIAGNOSIS — E78.5 DYSLIPIDEMIA: ICD-10-CM

## 2018-01-25 PROCEDURE — 99214 OFFICE O/P EST MOD 30 MIN: CPT | Performed by: NURSE PRACTITIONER

## 2018-01-25 RX ORDER — NEBIVOLOL HYDROCHLORIDE 10 MG/1
10 TABLET ORAL DAILY
Qty: 90 TABLET | Refills: 3 | Status: SHIPPED | OUTPATIENT
Start: 2018-01-25 | End: 2018-07-25 | Stop reason: SDUPTHER

## 2018-01-25 NOTE — PROGRESS NOTES
"Subjective   Dayanna Lewis is a 62 y.o. female is here today for follow-up.  Chief Complaint   Patient presents with   • Diabetes     recent labs, pt tests BG 2-3x daily, pt has pump   • Hyperlipidemia   • Hypertension   • Vitamin D Deficiency   • insulin pump titration     /82  Ht 156.2 cm (61.5\")  Wt 70.8 kg (156 lb)  LMP  (LMP Unknown)  BMI 29 kg/m2  Current Outpatient Prescriptions on File Prior to Visit   Medication Sig   • acetone, urine, test strip Use as directed   • aspirin 81 MG tablet Take 1 tablet by mouth daily.   • buPROPion XL (WELLBUTRIN XL) 300 MG 24 hr tablet TAKE 1 TABLET BY MOUTH EVERY MORNING   • folic acid (FOLVITE) 1 MG tablet TAKE 1 TABLET BY MOUTH DAILY   • glucagon (GLUCAGON EMERGENCY) 1 MG injection Use as directed   • glucose blood test strip Mari Contour Next Test In Vitro Strip; Patient Sig: Mari Contour Next Test In Vitro Strip test 8 times daily; 240; 5; 10-Nov-2014; Active   • hydrochlorothiazide (HYDRODIURIL) 25 MG tablet TK 1/2 TO 1 T PO QD   • insulin glulisine (APIDRA) 100 UNIT/ML injection Used in an insulin pump up to 100 units daily   • LANTUS 100 UNIT/ML injection INJECT 21 UNITS UNDER THE SKIN EVERY DAY.   • Multiple Minerals-Vitamins (CITRACAL PLUS BONE DENSITY) tablet Take by mouth.   • raloxifene (EVISTA) 60 MG tablet Take 1 tablet by mouth Daily.   • rosuvastatin (CRESTOR) 10 MG tablet Take 1 tablet by mouth Every Night.   • vitamin D (ERGOCALCIFEROL) 09526 units capsule capsule Take 1 capsule by mouth Every 7 (Seven) Days.   • vitamin E 400 UNIT capsule Take 1 capsule by mouth daily.   • ZETIA 10 MG tablet TAKE 1 TABLET BY MOUTH EVERY DAY.   • [DISCONTINUED] BYSTOLIC 10 MG tablet Take 1 tablet by mouth Daily.   • [DISCONTINUED] insulin glargine (LANTUS) 100 UNIT/ML injection Inject  under the skin daily. Use as needed   • [DISCONTINUED] BYSTOLIC 10 MG tablet TAKE 1 TABLET BY MOUTH DAILY   • [DISCONTINUED] ezetimibe (ZETIA) 10 MG tablet Take 1 tablet by mouth " Daily.   • [DISCONTINUED] tobramycin 0.3 % solution ophthalmic solution INT 2 GTS IN OU QID   • [DISCONTINUED] vitamin D (ERGOCALCIFEROL) 92988 units capsule capsule TAKE 1 CAPSULE BY MOUTH EVERY 7 DAYS     No current facility-administered medications on file prior to visit.      Family History   Problem Relation Age of Onset   • Depression Mother    • Glaucoma Mother    • Diabetes Father    • Glaucoma Father    • Hypertension Father    • Diabetes Sister    • Thyroid disease Sister    • Diabetes Brother      Social History   Substance Use Topics   • Smoking status: Never Smoker   • Smokeless tobacco: None   • Alcohol use No     No Known Allergies      Diabetes   She has type 1 diabetes mellitus. MedicAlert identification noted. The initial diagnosis of diabetes was made 20 years ago. Hypoglycemia symptoms include mood changes and sleepiness. Pertinent negatives for hypoglycemia include no confusion, dizziness, headaches, hunger, nervousness/anxiousness, pallor, seizures, speech difficulty, sweats or tremors. Pertinent negatives for diabetes include no blurred vision, no chest pain, no fatigue, no foot paresthesias, no foot ulcerations, no polydipsia, no polyphagia, no polyuria, no visual change, no weakness and no weight loss. Pertinent negatives for hypoglycemia complications include no blackouts, no hospitalization, no nocturnal hypoglycemia, no required assistance and no required glucagon injection. Symptoms are improving. Pertinent negatives for diabetic complications include no CVA, heart disease, impotence, nephropathy, peripheral neuropathy, PVD or retinopathy. Risk factors for coronary artery disease include dyslipidemia, family history, hypertension, obesity and stress. Current diabetic treatment includes insulin pump. She is compliant with treatment most of the time. She is currently taking insulin pre-breakfast, pre-lunch and pre-dinner. Insulin injections are given by patient. Rotation sites for  injection include the abdominal wall. Her weight is stable. She is following a generally healthy diet. Meal planning includes carbohydrate counting. She has not had a previous visit with a dietitian. She participates in exercise daily. She monitors blood glucose at home 3-4 x per day. She monitors urine at home <1 x per month. Blood glucose monitoring compliance is good. Her home blood glucose trend is fluctuating minimally. Her breakfast blood glucose is taken between 8-9 am. Her breakfast blood glucose range is generally 70-90 mg/dl. Her lunch blood glucose is taken between 1-2 pm. Her lunch blood glucose range is generally 180-200 mg/dl. Her dinner blood glucose is taken between 7-8 pm. Her dinner blood glucose range is generally 140-180 mg/dl. Her highest blood glucose is >200 mg/dl. Her overall blood glucose range is 140-180 mg/dl. She sees a podiatrist.Eye exam is current.     Encounter Diagnoses   Name Primary?   • Essential hypertension Yes   • Vitamin D deficiency    • Uncontrolled type 1 diabetes mellitus with other specified complication    • Dyslipidemia    • Insulin pump titration      This is a 61 yo female pt here today for a routine follow up visit. She is being seen for the above diagnosis. She is on an insulin medtronic pump and has recently seen the diabetes educator And had some pump settings changed.  She had recent labs which were reviewed and she was provided a copy.  Her hemoglobin A1c is higher than it was previous and she blames this on the holidays.  Her pump download was reviewed and additional changes were made at today's visit.  She denies any significant hypoglycemic event.  She is needing a refill on her by systolic for blood pressure.  She is complaining of the cost of medications that her high deductible.  Other than that she states she feels good and has no complaints at today's visit.  The following portions of the patient's history were reviewed and updated as appropriate:  allergies, current medications, past family history, past medical history, past social history, past surgical history and problem list.    Review of Systems   Constitutional: Negative for fatigue and weight loss.   HENT: Negative for trouble swallowing.    Eyes: Negative for blurred vision and visual disturbance.   Respiratory: Negative for shortness of breath.    Cardiovascular: Negative for chest pain and leg swelling.   Endocrine: Negative for polydipsia, polyphagia and polyuria.   Genitourinary: Negative for impotence.   Skin: Negative for pallor and wound.   Neurological: Negative for dizziness, tremors, seizures, speech difficulty, weakness, numbness and headaches.   Psychiatric/Behavioral: Negative for confusion. The patient is not nervous/anxious.        Objective   Physical Exam   Constitutional: She is oriented to person, place, and time. She appears well-developed and well-nourished. No distress.   HENT:   Head: Normocephalic and atraumatic.   Right Ear: External ear normal.   Left Ear: External ear normal.   Nose: Nose normal.   Mouth/Throat: Oropharynx is clear and moist. No oropharyngeal exudate.   Eyes: Conjunctivae and EOM are normal. Pupils are equal, round, and reactive to light. Right eye exhibits no discharge. Left eye exhibits no discharge. No scleral icterus.   Neck: Trachea normal, normal range of motion and full passive range of motion without pain. Neck supple. No JVD present. No tracheal tenderness present. Carotid bruit is not present. No tracheal deviation, no edema and no erythema present. No thyroid mass and no thyromegaly present.   Cardiovascular: Normal rate, regular rhythm, normal heart sounds and intact distal pulses.  Exam reveals no gallop and no friction rub.    No murmur heard.  Pulmonary/Chest: Effort normal and breath sounds normal. No stridor. No respiratory distress. She has no wheezes. She has no rales. She exhibits no tenderness.   Abdominal: Soft. Bowel sounds are  normal. She exhibits no distension and no mass. There is no tenderness. There is no rebound and no guarding. No hernia.   Musculoskeletal: Normal range of motion. She exhibits no edema, tenderness or deformity.   Lymphadenopathy:     She has no cervical adenopathy.   Neurological: She is alert and oriented to person, place, and time. She has normal reflexes. She displays normal reflexes. No cranial nerve deficit. She exhibits normal muscle tone. Coordination normal.   Skin: Skin is warm and dry. No rash noted. She is not diaphoretic. No erythema. No pallor.   Psychiatric: She has a normal mood and affect. Her behavior is normal. Judgment and thought content normal.   Nursing note and vitals reviewed.    Results for orders placed or performed in visit on 01/10/18   T4 & TSH ([facility])   Result Value Ref Range    TSH 2.560 0.270 - 4.200 mIU/mL    T4, Total 5.97 4.50 - 11.70 mcg/dL   Uric Acid   Result Value Ref Range    Uric Acid 4.7 2.4 - 5.7 mg/dL   Vitamin D 25 Hydroxy   Result Value Ref Range    25 Hydroxy, Vitamin D 58.1 30.0 - 100.0 ng/mL   Comprehensive Metabolic Panel   Result Value Ref Range    Glucose 167 (H) 65 - 99 mg/dL    BUN 15 8 - 23 mg/dL    Creatinine 1.30 (H) 0.57 - 1.00 mg/dL    eGFR Non African Am 42 (L) >60 mL/min/1.73    eGFR African Am 50 (L) >60 mL/min/1.73    BUN/Creatinine Ratio 11.5 7.0 - 25.0    Sodium 143 136 - 145 mmol/L    Potassium 4.1 3.5 - 5.2 mmol/L    Chloride 101 98 - 107 mmol/L    Total CO2 31.2 (H) 22.0 - 29.0 mmol/L    Calcium 9.6 8.6 - 10.5 mg/dL    Total Protein 6.7 6.0 - 8.5 g/dL    Albumin 4.20 3.50 - 5.20 g/dL    Globulin 2.5 gm/dL    A/G Ratio 1.7 g/dL    Total Bilirubin 0.3 0.1 - 1.2 mg/dL    Alkaline Phosphatase 89 39 - 117 U/L    AST (SGOT) 22 1 - 32 U/L    ALT (SGPT) 19 1 - 33 U/L   Hemoglobin A1c   Result Value Ref Range    Hemoglobin A1C 8.20 (H) 4.80 - 5.60 %   Lipid Panel   Result Value Ref Range    Total Cholesterol 129 0 - 200 mg/dL    Triglycerides 67 0 - 150  mg/dL    HDL Cholesterol 70 (H) 40 - 60 mg/dL    VLDL Cholesterol 13.4 5 - 40 mg/dL    LDL Cholesterol  46 0 - 100 mg/dL   MicroAlbumin, Urine, Random   Result Value Ref Range    Microalbumin, Urine 5.6 Not Estab. ug/mL   Cardiovascular Risk Assessment   Result Value Ref Range    Interpretation Note          Assessment/Plan   Encounter Diagnoses   Name Primary?   • Essential hypertension Yes   • Vitamin D deficiency    • Uncontrolled type 1 diabetes mellitus with other specified complication    • Dyslipidemia    • Insulin pump titration    • Renal insufficiency      In summary, patient was seen and examined.  Her recent labs were reviewed.  Her cholesterol is stable.  She does have some renal insufficiency and we discussed increasing her fluid intake.  She states she does not drink enough water.  Her blood pressure is an satisfactory range.  Her diabetes is not at goal of less than 7.  Her hemoglobin A1c has gone up since her last visit.  She has followed up with the [employer] clinical educator and had some pump settings that she feels have been beneficial to her readings.  In looking at her pump download she is having consistent high blood sugars after dinner.  It was suggested that she decrease her carb insulin ratio to 5 starting at 6 PM to give her additional insulin to cover her meal.  I've asked that if she continues to have high blood sugars at bedtime that she contact the office for further adjustments.  Metabolically she is stable.  She will follow-up in 3-4 months  Decrease carb/insulin ratio to 5 at 6 pm  Continue all other meds and pump settings

## 2018-03-16 ENCOUNTER — TELEPHONE (OUTPATIENT)
Dept: ENDOCRINOLOGY | Age: 63
End: 2018-03-16

## 2018-03-16 RX ORDER — BUPROPION HYDROCHLORIDE 300 MG/1
TABLET ORAL
Qty: 90 TABLET | Refills: 0 | Status: SHIPPED | OUTPATIENT
Start: 2018-03-16 | End: 2018-06-10 | Stop reason: SDUPTHER

## 2018-03-16 NOTE — TELEPHONE ENCOUNTER
----- Message from GABRIELA Ferrer sent at 3/12/2018 12:51 PM EDT -----  Per message from ms juan regarding zetia. Discontinue med. If we have to make changes we can increase crestor instead.     Spoke to patient and informed her to discontinue the zetia. Pt expressed understanding.

## 2018-03-22 RX ORDER — FOLIC ACID 1 MG/1
TABLET ORAL
Qty: 90 TABLET | Refills: 0 | Status: SHIPPED | OUTPATIENT
Start: 2018-03-22 | End: 2018-06-26 | Stop reason: SDUPTHER

## 2018-03-23 ENCOUNTER — TELEPHONE (OUTPATIENT)
Dept: ENDOCRINOLOGY | Age: 63
End: 2018-03-23

## 2018-03-23 NOTE — TELEPHONE ENCOUNTER
----- Message from Yoliequyen Guzman sent at 3/14/2018  2:50 PM EDT -----  Contact: PATIENT     MESSAGE FROM PATIENT     MESSAGE/ISSUE:  PATIENT HAS CALLED BACK FROM YOU PHONE CALL.   PATIENT HAS PREVIOUSLY CALLED IN STATING A SPECIFIC   MEDICATION SHE WAS ON IS NOT COVERED AND DID NOT KNOW IF SHE NEEDS TO SWITCH MEDICATION AND SENT IN NEW PRESCRIPTION.         CALL BACK NUMBER:   [phone]    Spoke to patient and told her to stop medication that is not covered, Pamela said if further adjustment need to be made we can adjust crestor, pt expressed understanding

## 2018-03-29 DIAGNOSIS — E55.9 VITAMIN D DEFICIENCY: Primary | ICD-10-CM

## 2018-03-29 DIAGNOSIS — R53.82 CHRONIC FATIGUE: ICD-10-CM

## 2018-03-29 DIAGNOSIS — IMO0002 UNCONTROLLED TYPE 1 DIABETES MELLITUS WITH OTHER SPECIFIED COMPLICATION: ICD-10-CM

## 2018-03-29 DIAGNOSIS — E78.5 DYSLIPIDEMIA: ICD-10-CM

## 2018-04-11 ENCOUNTER — LAB (OUTPATIENT)
Dept: ENDOCRINOLOGY | Age: 63
End: 2018-04-11

## 2018-04-11 DIAGNOSIS — IMO0002 UNCONTROLLED TYPE 1 DIABETES MELLITUS WITH OTHER SPECIFIED COMPLICATION: ICD-10-CM

## 2018-04-11 DIAGNOSIS — E78.5 DYSLIPIDEMIA: ICD-10-CM

## 2018-04-11 DIAGNOSIS — E55.9 VITAMIN D DEFICIENCY: ICD-10-CM

## 2018-04-11 DIAGNOSIS — R53.82 CHRONIC FATIGUE: ICD-10-CM

## 2018-04-12 LAB
25(OH)D3+25(OH)D2 SERPL-MCNC: 58.7 NG/ML (ref 30–100)
ALBUMIN SERPL-MCNC: 4 G/DL (ref 3.5–5.2)
ALBUMIN/GLOB SERPL: 1.8 G/DL
ALP SERPL-CCNC: 90 U/L (ref 39–117)
ALT SERPL-CCNC: 15 U/L (ref 1–33)
AST SERPL-CCNC: 24 U/L (ref 1–32)
BILIRUB SERPL-MCNC: 0.3 MG/DL (ref 0.1–1.2)
BUN SERPL-MCNC: 14 MG/DL (ref 8–23)
BUN/CREAT SERPL: 11.9 (ref 7–25)
C PEPTIDE SERPL-MCNC: <0.1 NG/ML (ref 1.1–4.4)
CALCIUM SERPL-MCNC: 9.1 MG/DL (ref 8.6–10.5)
CHLORIDE SERPL-SCNC: 102 MMOL/L (ref 98–107)
CHOLEST SERPL-MCNC: 151 MG/DL (ref 0–200)
CO2 SERPL-SCNC: 26.3 MMOL/L (ref 22–29)
CREAT SERPL-MCNC: 1.18 MG/DL (ref 0.57–1)
GFR SERPLBLD CREATININE-BSD FMLA CKD-EPI: 47 ML/MIN/1.73
GFR SERPLBLD CREATININE-BSD FMLA CKD-EPI: 56 ML/MIN/1.73
GLOBULIN SER CALC-MCNC: 2.2 GM/DL
GLUCOSE SERPL-MCNC: 134 MG/DL (ref 65–99)
HBA1C MFR BLD: 8.2 % (ref 4.8–5.6)
HDLC SERPL-MCNC: 61 MG/DL (ref 40–60)
INTERPRETATION: NORMAL
LDLC SERPL CALC-MCNC: 58 MG/DL (ref 0–100)
Lab: NORMAL
MICROALBUMIN UR-MCNC: 17.6 UG/ML
POTASSIUM SERPL-SCNC: 3.7 MMOL/L (ref 3.5–5.2)
PROT SERPL-MCNC: 6.2 G/DL (ref 6–8.5)
SODIUM SERPL-SCNC: 141 MMOL/L (ref 136–145)
T4 SERPL-MCNC: 6.33 MCG/DL (ref 4.5–11.7)
TRIGL SERPL-MCNC: 159 MG/DL (ref 0–150)
TSH SERPL DL<=0.005 MIU/L-ACNC: 4.56 MIU/ML (ref 0.27–4.2)
URATE SERPL-MCNC: 4.5 MG/DL (ref 2.4–5.7)
VLDLC SERPL CALC-MCNC: 31.8 MG/DL (ref 5–40)

## 2018-04-25 ENCOUNTER — OFFICE VISIT (OUTPATIENT)
Dept: ENDOCRINOLOGY | Age: 63
End: 2018-04-25

## 2018-04-25 VITALS
RESPIRATION RATE: 16 BRPM | HEIGHT: 61 IN | SYSTOLIC BLOOD PRESSURE: 130 MMHG | DIASTOLIC BLOOD PRESSURE: 80 MMHG | WEIGHT: 161.2 LBS | BODY MASS INDEX: 30.43 KG/M2

## 2018-04-25 DIAGNOSIS — E03.9 PRIMARY HYPOTHYROIDISM: ICD-10-CM

## 2018-04-25 DIAGNOSIS — Z78.0 MENOPAUSE: ICD-10-CM

## 2018-04-25 DIAGNOSIS — I10 ESSENTIAL HYPERTENSION: ICD-10-CM

## 2018-04-25 DIAGNOSIS — E16.2 HYPOGLYCEMIA: ICD-10-CM

## 2018-04-25 DIAGNOSIS — IMO0002 UNCONTROLLED TYPE 1 DIABETES MELLITUS WITH OTHER SPECIFIED COMPLICATION: Primary | ICD-10-CM

## 2018-04-25 DIAGNOSIS — E55.9 VITAMIN D DEFICIENCY: ICD-10-CM

## 2018-04-25 DIAGNOSIS — R53.82 CHRONIC FATIGUE: ICD-10-CM

## 2018-04-25 DIAGNOSIS — E78.5 DYSLIPIDEMIA: ICD-10-CM

## 2018-04-25 PROCEDURE — 99214 OFFICE O/P EST MOD 30 MIN: CPT | Performed by: INTERNAL MEDICINE

## 2018-04-25 RX ORDER — RANITIDINE 150 MG/1
150 TABLET ORAL AS NEEDED
COMMUNITY
End: 2019-10-03

## 2018-04-25 RX ORDER — LEVOTHYROXINE SODIUM 75 MCG
75 TABLET ORAL DAILY
Qty: 30 TABLET | Refills: 11 | Status: SHIPPED | OUTPATIENT
Start: 2018-04-25 | End: 2018-07-25 | Stop reason: SDUPTHER

## 2018-04-25 RX ORDER — ROSUVASTATIN CALCIUM 20 MG/1
20 TABLET, COATED ORAL NIGHTLY
Qty: 30 TABLET | Refills: 11 | Status: SHIPPED | OUTPATIENT
Start: 2018-04-25 | End: 2018-07-25 | Stop reason: DRUGHIGH

## 2018-04-25 NOTE — PROGRESS NOTES
"Subjective   Dayanna Lewis is a 62 y.o. female seen for follow up for DM1, dyslipidemia, HTN, osteoporosis, vit d deficiency, lab review. Patient is checking BG 3 times a day. Patient is currently using a [employer] insulin pump and changing sites every 2 days. She states that since she has had an adjustment to her pump settings she has had a lot of hypoglycemic episodes. She states that she met with Adela from [facility] but she is still having problems.      History of Present Illness this is a 62-year-old female known patient with type I diabetes hypertension and dyslipidemia as well as menopausal symptoms with osteoporosis and vitamin D deficiency.  Despite modifications and corrections in her pump function she still complains of hypoglycemic episodes.    /80   Resp 16   Ht 154.9 cm (61\")   Wt 73.1 kg (161 lb 3.2 oz)   LMP  (LMP Unknown)   BMI 30.46 kg/m²      No Known Allergies    Current Outpatient Prescriptions:   •  acetone, urine, test strip, Use as directed, Disp: 50 each, Rfl: 1  •  aspirin 81 MG tablet, Take 1 tablet by mouth daily., Disp: , Rfl:   •  buPROPion XL (WELLBUTRIN XL) 300 MG 24 hr tablet, TAKE 1 TABLET BY MOUTH EVERY MORNING, Disp: 90 tablet, Rfl: 0  •  BYSTOLIC 10 MG tablet, Take 1 tablet by mouth Daily., Disp: 90 tablet, Rfl: 3  •  folic acid (FOLVITE) 1 MG tablet, TAKE 1 TABLET BY MOUTH DAILY, Disp: 90 tablet, Rfl: 0  •  glucagon (GLUCAGON EMERGENCY) 1 MG injection, Use as directed, Disp: 2 kit, Rfl: 1  •  glucose blood test strip, Mari Contour Next Test In Vitro Strip; Patient Sig: Mari Contour Next Test In Vitro Strip test 8 times daily; 240; 5; 10-Nov-2014; Active, Disp: , Rfl:   •  hydrochlorothiazide (HYDRODIURIL) 25 MG tablet, TK 1/2 TO 1 T PO QD, Disp: , Rfl: 0  •  insulin glulisine (APIDRA) 100 UNIT/ML injection, Used in an insulin pump up to 100 units daily, Disp: 30 mL, Rfl: 5  •  LANTUS 100 UNIT/ML injection, INJECT 21 UNITS UNDER THE SKIN EVERY DAY., Disp: 10 mL, " Rfl: 0  •  Multiple Minerals-Vitamins (CITRACAL PLUS BONE DENSITY) tablet, Take by mouth., Disp: , Rfl:   •  raloxifene (EVISTA) 60 MG tablet, Take 1 tablet by mouth Daily., Disp: 90 tablet, Rfl: 3  •  raNITIdine (ZANTAC) 150 MG tablet, Take 150 mg by mouth As Needed for Heartburn., Disp: , Rfl:   •  rosuvastatin (CRESTOR) 10 MG tablet, Take 1 tablet by mouth Every Night., Disp: 90 tablet, Rfl: 3  •  vitamin D (ERGOCALCIFEROL) 46699 units capsule capsule, Take 1 capsule by mouth Every 7 (Seven) Days., Disp: 13 capsule, Rfl: 3  •  vitamin E 400 UNIT capsule, Take 1 capsule by mouth daily., Disp: , Rfl:     The following portions of the patient's history were reviewed and updated as appropriate: allergies, current medications, past family history, past medical history, past social history, past surgical history and problem list.    Review of Systems   Constitutional: Negative.  Negative for fatigue.   HENT: Negative.    Eyes: Negative.    Respiratory: Negative.    Cardiovascular: Negative.  Negative for chest pain.   Gastrointestinal: Negative.    Endocrine: Negative.  Negative for polydipsia, polyphagia and polyuria.   Genitourinary: Negative.    Musculoskeletal: Negative.    Skin: Negative.  Negative for pallor.   Allergic/Immunologic: Negative.    Neurological: Negative.  Negative for dizziness, tremors, seizures, speech difficulty, weakness and headaches.   Hematological: Negative.    Psychiatric/Behavioral: Negative.  Negative for confusion. The patient is not nervous/anxious.        Objective   Physical Exam   Constitutional: She is oriented to person, place, and time. She appears well-developed and well-nourished. No distress.   HENT:   Head: Normocephalic and atraumatic.   Right Ear: External ear normal.   Left Ear: External ear normal.   Nose: Nose normal.   Mouth/Throat: Oropharynx is clear and moist. No oropharyngeal exudate.   Eyes: Conjunctivae and EOM are normal. Pupils are equal, round, and reactive to  light. Right eye exhibits no discharge. Left eye exhibits no discharge. No scleral icterus.   Neck: Normal range of motion. Neck supple. No JVD present. No tracheal deviation present. No thyromegaly present.   Cardiovascular: Normal rate, regular rhythm, normal heart sounds and intact distal pulses.  Exam reveals no gallop and no friction rub.    No murmur heard.  Pulmonary/Chest: Effort normal and breath sounds normal. No stridor. No respiratory distress. She has no wheezes. She has no rales. She exhibits no tenderness.   Abdominal: Soft. Bowel sounds are normal. She exhibits no distension and no mass. There is no tenderness. There is no rebound and no guarding. No hernia.   Musculoskeletal: Normal range of motion. She exhibits no edema, tenderness or deformity.   Lymphadenopathy:     She has no cervical adenopathy.   Neurological: She is alert and oriented to person, place, and time. She has normal reflexes. She displays normal reflexes. No cranial nerve deficit. She exhibits normal muscle tone. Coordination normal.   Skin: Skin is warm and dry. No rash noted. She is not diaphoretic. No erythema. No pallor.   Psychiatric: She has a normal mood and affect. Her behavior is normal. Judgment and thought content normal.   Nursing note and vitals reviewed.    Results for orders placed or performed in visit on 04/11/18   Comprehensive Metabolic Panel   Result Value Ref Range    Glucose 134 (H) 65 - 99 mg/dL    BUN 14 8 - 23 mg/dL    Creatinine 1.18 (H) 0.57 - 1.00 mg/dL    eGFR Non African Am 47 (L) >60 mL/min/1.73    eGFR African Am 56 (L) >60 mL/min/1.73    BUN/Creatinine Ratio 11.9 7.0 - 25.0    Sodium 141 136 - 145 mmol/L    Potassium 3.7 3.5 - 5.2 mmol/L    Chloride 102 98 - 107 mmol/L    Total CO2 26.3 22.0 - 29.0 mmol/L    Calcium 9.1 8.6 - 10.5 mg/dL    Total Protein 6.2 6.0 - 8.5 g/dL    Albumin 4.00 3.50 - 5.20 g/dL    Globulin 2.2 gm/dL    A/G Ratio 1.8 g/dL    Total Bilirubin 0.3 0.1 - 1.2 mg/dL    Alkaline  Phosphatase 90 39 - 117 U/L    AST (SGOT) 24 1 - 32 U/L    ALT (SGPT) 15 1 - 33 U/L   C-Peptide   Result Value Ref Range    C-Peptide <0.1 (L) 1.1 - 4.4 ng/mL   Hemoglobin A1c   Result Value Ref Range    Hemoglobin A1C 8.20 (H) 4.80 - 5.60 %   Lipid Panel   Result Value Ref Range    Total Cholesterol 151 0 - 200 mg/dL    Triglycerides 159 (H) 0 - 150 mg/dL    HDL Cholesterol 61 (H) 40 - 60 mg/dL    VLDL Cholesterol 31.8 5 - 40 mg/dL    LDL Cholesterol  58 0 - 100 mg/dL   Uric Acid   Result Value Ref Range    Uric Acid 4.5 2.4 - 5.7 mg/dL   Vitamin D 25 Hydroxy   Result Value Ref Range    25 Hydroxy, Vitamin D 58.7 30.0 - 100.0 ng/ml   T4 & TSH ([facility])   Result Value Ref Range    TSH 4.560 (H) 0.270 - 4.200 mIU/mL    T4, Total 6.33 4.50 - 11.70 mcg/dL   Cardiovascular Risk Assessment   Result Value Ref Range    Interpretation Note    Diabetes Patient Education   Result Value Ref Range    PDF Image Not applicable    MicroAlbumin, Urine, Random   Result Value Ref Range    Microalbumin, Urine 17.6 Not Estab. ug/mL         Assessment/Plan   Diagnoses and all orders for this visit:    Uncontrolled type 1 diabetes mellitus with other specified complication  -     T3, Free; Future  -     T4 & TSH ([facility]); Future  -     T4, Free; Future  -     Thyroglobulin With Anti-TG; Future  -     Uric Acid; Future  -     Vitamin D 25 Hydroxy; Future  -     Comprehensive Metabolic Panel; Future  -     Hemoglobin A1c; Future  -     Lipid Panel; Future  -     MicroAlbumin, Urine, Random - Urine, Clean Catch; Future    Essential hypertension  -     T3, Free; Future  -     T4 & TSH ([facility]); Future  -     T4, Free; Future  -     Thyroglobulin With Anti-TG; Future  -     Uric Acid; Future  -     Vitamin D 25 Hydroxy; Future  -     Comprehensive Metabolic Panel; Future  -     Hemoglobin A1c; Future  -     Lipid Panel; Future  -     MicroAlbumin, Urine, Random - Urine, Clean Catch; Future    Vitamin D deficiency  -     T3, Free; Future  -      T4 & TSH ([facility]); Future  -     T4, Free; Future  -     Thyroglobulin With Anti-TG; Future  -     Uric Acid; Future  -     Vitamin D 25 Hydroxy; Future  -     Comprehensive Metabolic Panel; Future  -     Hemoglobin A1c; Future  -     Lipid Panel; Future  -     MicroAlbumin, Urine, Random - Urine, Clean Catch; Future    Hypoglycemia  -     T3, Free; Future  -     T4 & TSH ([facility]); Future  -     T4, Free; Future  -     Thyroglobulin With Anti-TG; Future  -     Uric Acid; Future  -     Vitamin D 25 Hydroxy; Future  -     Comprehensive Metabolic Panel; Future  -     Hemoglobin A1c; Future  -     Lipid Panel; Future  -     MicroAlbumin, Urine, Random - Urine, Clean Catch; Future    Dyslipidemia  -     T3, Free; Future  -     T4 & TSH ([facility]); Future  -     T4, Free; Future  -     Thyroglobulin With Anti-TG; Future  -     Uric Acid; Future  -     Vitamin D 25 Hydroxy; Future  -     Comprehensive Metabolic Panel; Future  -     Hemoglobin A1c; Future  -     Lipid Panel; Future  -     MicroAlbumin, Urine, Random - Urine, Clean Catch; Future    Menopause  -     T3, Free; Future  -     T4 & TSH ([facility]); Future  -     T4, Free; Future  -     Thyroglobulin With Anti-TG; Future  -     Uric Acid; Future  -     Vitamin D 25 Hydroxy; Future  -     Comprehensive Metabolic Panel; Future  -     Hemoglobin A1c; Future  -     Lipid Panel; Future  -     MicroAlbumin, Urine, Random - Urine, Clean Catch; Future    Chronic fatigue  -     T3, Free; Future  -     T4 & TSH ([facility]); Future  -     T4, Free; Future  -     Thyroglobulin With Anti-TG; Future  -     Uric Acid; Future  -     Vitamin D 25 Hydroxy; Future  -     Comprehensive Metabolic Panel; Future  -     Hemoglobin A1c; Future  -     Lipid Panel; Future  -     MicroAlbumin, Urine, Random - Urine, Clean Catch; Future    Primary hypothyroidism  -     T3, Free; Future  -     T4 & TSH ([facility]); Future  -     T4, Free; Future  -     Thyroglobulin With Anti-TG; Future  -      Uric Acid; Future  -     Vitamin D 25 Hydroxy; Future  -     Comprehensive Metabolic Panel; Future  -     Hemoglobin A1c; Future  -     Lipid Panel; Future  -     MicroAlbumin, Urine, Random - Urine, Clean Catch; Future    Other orders  -     SYNTHROID 75 MCG tablet; Take 1 tablet by mouth Daily.  -     rosuvastatin (CRESTOR) 20 MG tablet; Take 1 tablet by mouth Every Night.             in summary I saw and examined this 62-year-old female for above-mentioned problems.  I reviewed her laboratory evaluation of 04/11/2018 and provided her with a hard copy of it.  Outside the fact that her hemoglobin A1c is 8.20 she is otherwise clinically and metabolically stable however we are also dealing with the new case of hypothyroidism for which I am going to start her on Synthroid 75 µg daily.  We will continue all her current prescriptions and allow her to work with the representative from [facility] to give her a smoother glycemic profile.  She will see Ms. Pamela Najera in 4 months or sooner if needed with laboratory evaluations prior to each office visit.

## 2018-06-11 RX ORDER — BUPROPION HYDROCHLORIDE 300 MG/1
TABLET ORAL
Qty: 90 TABLET | Refills: 0 | Status: SHIPPED | OUTPATIENT
Start: 2018-06-11 | End: 2018-09-02 | Stop reason: SDUPTHER

## 2018-06-26 RX ORDER — FOLIC ACID 1 MG/1
TABLET ORAL
Qty: 90 TABLET | Refills: 0 | Status: SHIPPED | OUTPATIENT
Start: 2018-06-26 | End: 2018-09-25 | Stop reason: SDUPTHER

## 2018-06-28 DIAGNOSIS — E03.9 PRIMARY HYPOTHYROIDISM: ICD-10-CM

## 2018-06-28 DIAGNOSIS — E55.9 VITAMIN D DEFICIENCY: ICD-10-CM

## 2018-06-28 DIAGNOSIS — IMO0002 UNCONTROLLED TYPE 1 DIABETES MELLITUS WITH OTHER SPECIFIED COMPLICATION: Primary | ICD-10-CM

## 2018-06-29 DIAGNOSIS — E55.9 AVITAMINOSIS D: ICD-10-CM

## 2018-06-29 DIAGNOSIS — E78.5 DYSLIPIDEMIA: ICD-10-CM

## 2018-06-29 DIAGNOSIS — I10 ESSENTIAL HYPERTENSION: ICD-10-CM

## 2018-06-29 DIAGNOSIS — R53.82 CHRONIC FATIGUE: ICD-10-CM

## 2018-06-29 DIAGNOSIS — Z46.81 INSULIN PUMP TITRATION: ICD-10-CM

## 2018-06-29 RX ORDER — RALOXIFENE HYDROCHLORIDE 60 MG/1
60 TABLET, FILM COATED ORAL DAILY
Qty: 90 TABLET | Refills: 3 | Status: SHIPPED | OUTPATIENT
Start: 2018-06-29 | End: 2018-07-25 | Stop reason: SDUPTHER

## 2018-07-11 ENCOUNTER — LAB (OUTPATIENT)
Dept: ENDOCRINOLOGY | Age: 63
End: 2018-07-11

## 2018-07-11 DIAGNOSIS — IMO0002 UNCONTROLLED TYPE 1 DIABETES MELLITUS WITH OTHER SPECIFIED COMPLICATION: ICD-10-CM

## 2018-07-11 DIAGNOSIS — E03.9 PRIMARY HYPOTHYROIDISM: ICD-10-CM

## 2018-07-11 DIAGNOSIS — E55.9 VITAMIN D DEFICIENCY: ICD-10-CM

## 2018-07-13 LAB
25(OH)D3+25(OH)D2 SERPL-MCNC: 51.1 NG/ML (ref 30–100)
ALBUMIN SERPL-MCNC: 4 G/DL (ref 3.5–5.2)
ALBUMIN/GLOB SERPL: 1.9 G/DL
ALP SERPL-CCNC: 70 U/L (ref 39–117)
ALT SERPL-CCNC: 15 U/L (ref 1–33)
AST SERPL-CCNC: 24 U/L (ref 1–32)
BILIRUB SERPL-MCNC: 0.4 MG/DL (ref 0.1–1.2)
BUN SERPL-MCNC: 18 MG/DL (ref 8–23)
BUN/CREAT SERPL: 12.9 (ref 7–25)
C PEPTIDE SERPL-MCNC: <0.1 NG/ML (ref 1.1–4.4)
CALCIUM SERPL-MCNC: 9.6 MG/DL (ref 8.6–10.5)
CHLORIDE SERPL-SCNC: 98 MMOL/L (ref 98–107)
CHOLEST SERPL-MCNC: 125 MG/DL (ref 0–200)
CO2 SERPL-SCNC: 29.8 MMOL/L (ref 22–29)
CREAT SERPL-MCNC: 1.39 MG/DL (ref 0.57–1)
FT4I SERPL CALC-MCNC: 2.4 (ref 1.2–4.9)
GLOBULIN SER CALC-MCNC: 2.1 GM/DL
GLUCOSE SERPL-MCNC: 180 MG/DL (ref 65–99)
HBA1C MFR BLD: 7.6 % (ref 4.8–5.6)
HDLC SERPL-MCNC: 56 MG/DL (ref 40–60)
INTERPRETATION: NORMAL
LDLC SERPL CALC-MCNC: 50 MG/DL (ref 0–100)
Lab: NORMAL
MICROALBUMIN UR-MCNC: 4.2 UG/ML
POTASSIUM SERPL-SCNC: 3.5 MMOL/L (ref 3.5–5.2)
PROT SERPL-MCNC: 6.1 G/DL (ref 6–8.5)
SODIUM SERPL-SCNC: 142 MMOL/L (ref 136–145)
T3FREE SERPL-MCNC: 2.8 PG/ML (ref 2–4.4)
T3RU NFR SERPL: 31 % (ref 24–39)
T4 FREE SERPL-MCNC: 1.58 NG/DL (ref 0.93–1.7)
T4 SERPL-MCNC: 7.8 UG/DL (ref 4.5–12)
THYROGLOB AB SERPL-ACNC: <1 IU/ML
THYROGLOB SERPL-MCNC: 6 NG/ML
THYROGLOB SERPL-MCNC: NORMAL NG/ML
TRIGL SERPL-MCNC: 97 MG/DL (ref 0–150)
TSH SERPL DL<=0.005 MIU/L-ACNC: 0.25 UIU/ML (ref 0.45–4.5)
VLDLC SERPL CALC-MCNC: 19.4 MG/DL (ref 5–40)

## 2018-07-25 ENCOUNTER — OFFICE VISIT (OUTPATIENT)
Dept: ENDOCRINOLOGY | Age: 63
End: 2018-07-25

## 2018-07-25 VITALS
SYSTOLIC BLOOD PRESSURE: 92 MMHG | BODY MASS INDEX: 29.83 KG/M2 | WEIGHT: 158 LBS | HEIGHT: 61 IN | DIASTOLIC BLOOD PRESSURE: 62 MMHG

## 2018-07-25 DIAGNOSIS — E78.5 DYSLIPIDEMIA: ICD-10-CM

## 2018-07-25 DIAGNOSIS — E55.9 AVITAMINOSIS D: ICD-10-CM

## 2018-07-25 DIAGNOSIS — E55.9 VITAMIN D DEFICIENCY: ICD-10-CM

## 2018-07-25 DIAGNOSIS — E03.9 PRIMARY HYPOTHYROIDISM: ICD-10-CM

## 2018-07-25 DIAGNOSIS — Z46.81 INSULIN PUMP TITRATION: ICD-10-CM

## 2018-07-25 DIAGNOSIS — I10 ESSENTIAL HYPERTENSION: ICD-10-CM

## 2018-07-25 DIAGNOSIS — IMO0002 UNCONTROLLED TYPE 1 DIABETES MELLITUS WITH OTHER SPECIFIED COMPLICATION: Primary | ICD-10-CM

## 2018-07-25 DIAGNOSIS — R53.82 CHRONIC FATIGUE: ICD-10-CM

## 2018-07-25 PROCEDURE — 99214 OFFICE O/P EST MOD 30 MIN: CPT | Performed by: NURSE PRACTITIONER

## 2018-07-25 RX ORDER — ERGOCALCIFEROL 1.25 MG/1
50000 CAPSULE ORAL
Qty: 13 CAPSULE | Refills: 1 | Status: SHIPPED | OUTPATIENT
Start: 2018-07-25 | End: 2018-10-10 | Stop reason: ALTCHOICE

## 2018-07-25 RX ORDER — LEVOTHYROXINE SODIUM 75 MCG
75 TABLET ORAL DAILY
Qty: 90 TABLET | Refills: 1 | Status: SHIPPED | OUTPATIENT
Start: 2018-07-25 | End: 2018-10-10 | Stop reason: SDUPTHER

## 2018-07-25 RX ORDER — ROSUVASTATIN CALCIUM 10 MG/1
10 TABLET, COATED ORAL NIGHTLY
COMMUNITY
End: 2018-07-25 | Stop reason: SDUPTHER

## 2018-07-25 RX ORDER — ROSUVASTATIN CALCIUM 10 MG/1
10 TABLET, COATED ORAL NIGHTLY
Qty: 90 TABLET | Refills: 1 | Status: SHIPPED | OUTPATIENT
Start: 2018-07-25 | End: 2018-10-10 | Stop reason: SDUPTHER

## 2018-07-25 RX ORDER — RALOXIFENE HYDROCHLORIDE 60 MG/1
60 TABLET, FILM COATED ORAL DAILY
Qty: 90 TABLET | Refills: 1 | Status: SHIPPED | OUTPATIENT
Start: 2018-07-25 | End: 2018-10-10 | Stop reason: SDUPTHER

## 2018-07-25 RX ORDER — NEBIVOLOL HYDROCHLORIDE 10 MG/1
10 TABLET ORAL DAILY
Qty: 90 TABLET | Refills: 1 | Status: SHIPPED | OUTPATIENT
Start: 2018-07-25 | End: 2018-10-10 | Stop reason: SDUPTHER

## 2018-07-25 NOTE — PROGRESS NOTES
"Ramya Lewis is a 62 y.o. female is here today for follow-up.  Chief Complaint   Patient presents with   • Diabetes     recent labs, pt tests BG 3x daily, pt has pump   • Hypothyroidism   • Hypertension   • Hyperlipidemia   • Vitamin D Deficiency     BP 92/62   Ht 154.9 cm (61\")   Wt 71.7 kg (158 lb)   LMP  (LMP Unknown)   BMI 29.85 kg/m²   Current Outpatient Prescriptions on File Prior to Visit   Medication Sig   • acetone, urine, test strip Use as directed   • aspirin 81 MG tablet Take 1 tablet by mouth daily.   • buPROPion XL (WELLBUTRIN XL) 300 MG 24 hr tablet TAKE 1 TABLET BY MOUTH EVERY MORNING   • BYSTOLIC 10 MG tablet Take 1 tablet by mouth Daily.   • folic acid (FOLVITE) 1 MG tablet TAKE 1 TABLET BY MOUTH DAILY   • glucagon (GLUCAGON EMERGENCY) 1 MG injection Use as directed   • glucose blood test strip Mari Contour Next Test In Vitro Strip; Patient Sig: Mari Contour Next Test In Vitro Strip test 8 times daily; 240; 5; 10-Nov-2014; Active   • hydrochlorothiazide (HYDRODIURIL) 25 MG tablet TK 1/2 TO 1 T PO QD   • insulin glulisine (APIDRA) 100 UNIT/ML injection Used in an insulin pump up to 100 units daily   • LANTUS 100 UNIT/ML injection INJECT 21 UNITS UNDER THE SKIN EVERY DAY.   • Multiple Minerals-Vitamins (CITRACAL PLUS BONE DENSITY) tablet Take by mouth.   • raloxifene (EVISTA) 60 MG tablet Take 1 tablet by mouth Daily.   • raNITIdine (ZANTAC) 150 MG tablet Take 150 mg by mouth As Needed for Heartburn.   • SYNTHROID 75 MCG tablet Take 1 tablet by mouth Daily.   • vitamin D (ERGOCALCIFEROL) 82625 units capsule capsule Take 1 capsule by mouth Every 7 (Seven) Days.   • vitamin E 400 UNIT capsule Take 1 capsule by mouth daily.   • [DISCONTINUED] rosuvastatin (CRESTOR) 20 MG tablet Take 1 tablet by mouth Every Night.     No current facility-administered medications on file prior to visit.      Family History   Problem Relation Age of Onset   • Depression Mother    • Glaucoma Mother    • " Diabetes Father    • Glaucoma Father    • Hypertension Father    • Diabetes Sister    • Thyroid disease Sister    • Diabetes Brother      Social History   Substance Use Topics   • Smoking status: Never Smoker   • Smokeless tobacco: Not on file   • Alcohol use No     No Known Allergies      History of Present Illness  Encounter Diagnoses   Name Primary?   • Essential hypertension    • Vitamin D deficiency    • Uncontrolled type 1 diabetes mellitus with other specified complication (CMS/HCC) Yes   • Primary hypothyroidism    • Dyslipidemia    • Insulin pump titration      This is a 62-year-old male patient here today for routine follow-up visit.  She is being seen for the above-mentioned problems.  Her pump download was reviewed.  She's got an appointment also today to see the [employer] diabetes educator for further pump adjustments.  She is taking her medications as prescribed.  She is requesting 90 day refills.  She does have occasional low blood sugars in which she feels like she has lows too often.  She is wanting to lose weight and does not want to continue have problems with low blood sugars.  She recently was seen by the diabetes educator and did have some pump adjustments may which she states has improved her overall glycemic control.  Her recent hemoglobin A1c reflects that it has improved but is not a goal  The following portions of the patient's history were reviewed and updated as appropriate: allergies, current medications, past family history, past medical history, past social history, past surgical history and problem list.    Review of Systems   Constitutional: Negative for fatigue.   HENT: Negative for trouble swallowing.    Eyes: Negative for visual disturbance.   Respiratory: Negative for shortness of breath.    Cardiovascular: Negative for leg swelling.   Endocrine: Negative for polyuria.   Skin: Negative for wound.   Neurological: Negative for numbness.       Objective   Physical Exam    Constitutional: She is oriented to person, place, and time. She appears well-developed and well-nourished. No distress.   HENT:   Head: Normocephalic and atraumatic.   Right Ear: External ear normal.   Left Ear: External ear normal.   Nose: Nose normal.   Mouth/Throat: Oropharynx is clear and moist. No oropharyngeal exudate.   Eyes: Pupils are equal, round, and reactive to light. Conjunctivae and EOM are normal. Right eye exhibits no discharge. Left eye exhibits no discharge. No scleral icterus.   Neck: Trachea normal, normal range of motion and full passive range of motion without pain. Neck supple. No JVD present. No tracheal tenderness present. Carotid bruit is not present. No tracheal deviation, no edema and no erythema present. No thyroid mass and no thyromegaly present.   Cardiovascular: Normal rate, regular rhythm, normal heart sounds and intact distal pulses.  Exam reveals no gallop and no friction rub.    No murmur heard.  Pulmonary/Chest: Effort normal and breath sounds normal. No stridor. No respiratory distress. She has no wheezes. She has no rales. She exhibits no tenderness.   Abdominal: Soft. Bowel sounds are normal. She exhibits no distension and no mass. There is no tenderness. There is no rebound and no guarding. No hernia.   Musculoskeletal: Normal range of motion. She exhibits no edema, tenderness or deformity.   Lymphadenopathy:     She has no cervical adenopathy.   Neurological: She is alert and oriented to person, place, and time. She has normal reflexes. She displays normal reflexes. No cranial nerve deficit. She exhibits normal muscle tone. Coordination normal.   Skin: Skin is warm and dry. No rash noted. She is not diaphoretic. No erythema. No pallor.   Psychiatric: She has a normal mood and affect. Her behavior is normal. Judgment and thought content normal.   Nursing note and vitals reviewed.    Results for orders placed or performed in visit on 07/11/18   Comprehensive Metabolic Panel    Result Value Ref Range    Glucose 180 (H) 65 - 99 mg/dL    BUN 18 8 - 23 mg/dL    Creatinine 1.39 (H) 0.57 - 1.00 mg/dL    eGFR Non African Am 39 (L) >60 mL/min/1.73    eGFR  Am 47 (L) >60 mL/min/1.73    BUN/Creatinine Ratio 12.9 7.0 - 25.0    Sodium 142 136 - 145 mmol/L    Potassium 3.5 3.5 - 5.2 mmol/L    Chloride 98 98 - 107 mmol/L    Total CO2 29.8 (H) 22.0 - 29.0 mmol/L    Calcium 9.6 8.6 - 10.5 mg/dL    Total Protein 6.1 6.0 - 8.5 g/dL    Albumin 4.00 3.50 - 5.20 g/dL    Globulin 2.1 gm/dL    A/G Ratio 1.9 g/dL    Total Bilirubin 0.4 0.1 - 1.2 mg/dL    Alkaline Phosphatase 70 39 - 117 U/L    AST (SGOT) 24 1 - 32 U/L    ALT (SGPT) 15 1 - 33 U/L   Comprehensive Thyroglobulin   Result Value Ref Range    Thyroglobulin Ab <1.0 IU/mL    Thyroglobulin 6.0 ng/mL    Thyroglobulin (TG-MELQUIADES) Comment ng/mL   C-Peptide   Result Value Ref Range    C-Peptide <0.1 (L) 1.1 - 4.4 ng/mL   Hemoglobin A1c   Result Value Ref Range    Hemoglobin A1C 7.60 (H) 4.80 - 5.60 %   Lipid Panel   Result Value Ref Range    Total Cholesterol 125 0 - 200 mg/dL    Triglycerides 97 0 - 150 mg/dL    HDL Cholesterol 56 40 - 60 mg/dL    VLDL Cholesterol 19.4 5 - 40 mg/dL    LDL Cholesterol  50 0 - 100 mg/dL   T3, Free   Result Value Ref Range    T3, Free 2.8 2.0 - 4.4 pg/mL   T4, Free   Result Value Ref Range    Free T4 1.58 0.93 - 1.70 ng/dL   Thyroid Panel With TSH   Result Value Ref Range    TSH 0.252 (L) 0.450 - 4.500 uIU/mL    T4, Total 7.8 4.5 - 12.0 ug/dL    T3 Uptake 31 24 - 39 %    Free Thyroxine Index 2.4 1.2 - 4.9   Vitamin D 25 Hydroxy   Result Value Ref Range    25 Hydroxy, Vitamin D 51.1 30.0 - 100.0 ng/mL   Cardiovascular Risk Assessment   Result Value Ref Range    Interpretation Note    Diabetes Patient Education   Result Value Ref Range    PDF Image Not applicable    MicroAlbumin, Urine, Random   Result Value Ref Range    Microalbumin, Urine 4.2 Not Estab. ug/mL         Assessment/Plan   Problems Addressed this Visit         Cardiovascular and Mediastinum    HTN (hypertension)       Digestive    Vitamin D deficiency       Endocrine    Diabetes mellitus type 1, uncontrolled (CMS/HCC) - Primary    Primary hypothyroidism       Other    Dyslipidemia    Insulin pump titration        In summary, patient was seen and examined.  Her recent labs were reviewed and she was provided a copy.  Her glycemic control has improved however it is not a goal of less than 7.  She will follow-up today with the diabetes clinical educator regarding further pump adjustments.  Her pump download was reviewed at today's visit.  She does have excursions with high blood sugars following caloric intake.  She has minimized low blood sugars after having pump settings changes.  Metabolically she is stable.  Her thyroid hormone has improved on thyroid hormone.  She will follow-up over 90 days with myself or Dr. Kaplan with labs prior.  I've encouraged her to recheck the office should she have any questions or concerns prior to her next visit

## 2018-08-13 ENCOUNTER — RESULTS ENCOUNTER (OUTPATIENT)
Dept: ENDOCRINOLOGY | Age: 63
End: 2018-08-13

## 2018-08-13 DIAGNOSIS — E55.9 VITAMIN D DEFICIENCY: ICD-10-CM

## 2018-08-13 DIAGNOSIS — E03.9 PRIMARY HYPOTHYROIDISM: ICD-10-CM

## 2018-08-13 DIAGNOSIS — IMO0002 UNCONTROLLED TYPE 1 DIABETES MELLITUS WITH OTHER SPECIFIED COMPLICATION: ICD-10-CM

## 2018-08-13 DIAGNOSIS — I10 ESSENTIAL HYPERTENSION: ICD-10-CM

## 2018-08-13 DIAGNOSIS — E78.5 DYSLIPIDEMIA: ICD-10-CM

## 2018-08-13 DIAGNOSIS — Z78.0 MENOPAUSE: ICD-10-CM

## 2018-08-13 DIAGNOSIS — E16.2 HYPOGLYCEMIA: ICD-10-CM

## 2018-08-13 DIAGNOSIS — R53.82 CHRONIC FATIGUE: ICD-10-CM

## 2018-08-23 ENCOUNTER — APPOINTMENT (OUTPATIENT)
Dept: WOMENS IMAGING | Facility: HOSPITAL | Age: 63
End: 2018-08-23

## 2018-08-23 PROCEDURE — 77067 SCR MAMMO BI INCL CAD: CPT | Performed by: RADIOLOGY

## 2018-08-23 PROCEDURE — 77063 BREAST TOMOSYNTHESIS BI: CPT | Performed by: RADIOLOGY

## 2018-09-04 RX ORDER — BUPROPION HYDROCHLORIDE 300 MG/1
TABLET ORAL
Qty: 90 TABLET | Refills: 0 | Status: SHIPPED | OUTPATIENT
Start: 2018-09-04 | End: 2018-10-10 | Stop reason: SDUPTHER

## 2018-09-26 ENCOUNTER — LAB (OUTPATIENT)
Dept: ENDOCRINOLOGY | Age: 63
End: 2018-09-26

## 2018-09-26 DIAGNOSIS — I10 ESSENTIAL HYPERTENSION: ICD-10-CM

## 2018-09-26 DIAGNOSIS — R53.82 CHRONIC FATIGUE: ICD-10-CM

## 2018-09-26 DIAGNOSIS — E55.9 VITAMIN D DEFICIENCY: ICD-10-CM

## 2018-09-26 DIAGNOSIS — E16.2 HYPOGLYCEMIA: ICD-10-CM

## 2018-09-26 DIAGNOSIS — E78.5 DYSLIPIDEMIA: ICD-10-CM

## 2018-09-26 DIAGNOSIS — IMO0002 UNCONTROLLED TYPE 1 DIABETES MELLITUS WITH OTHER SPECIFIED COMPLICATION: ICD-10-CM

## 2018-09-26 DIAGNOSIS — Z78.0 MENOPAUSE: ICD-10-CM

## 2018-09-26 DIAGNOSIS — E03.9 PRIMARY HYPOTHYROIDISM: ICD-10-CM

## 2018-09-26 RX ORDER — FOLIC ACID 1 MG/1
TABLET ORAL
Qty: 90 TABLET | Refills: 0 | Status: SHIPPED | OUTPATIENT
Start: 2018-09-26 | End: 2019-12-30

## 2018-09-26 RX ORDER — FOLIC ACID 1 MG/1
TABLET ORAL
Qty: 90 TABLET | Refills: 0 | Status: SHIPPED | OUTPATIENT
Start: 2018-09-26 | End: 2018-10-10 | Stop reason: SDUPTHER

## 2018-09-27 LAB
25(OH)D3+25(OH)D2 SERPL-MCNC: 66.9 NG/ML (ref 30–100)
ALBUMIN SERPL-MCNC: 4.1 G/DL (ref 3.5–5.2)
ALBUMIN/GLOB SERPL: 1.8 G/DL
ALP SERPL-CCNC: 76 U/L (ref 39–117)
ALT SERPL-CCNC: 22 U/L (ref 1–33)
AST SERPL-CCNC: 25 U/L (ref 1–32)
BILIRUB SERPL-MCNC: 0.6 MG/DL (ref 0.1–1.2)
BUN SERPL-MCNC: 15 MG/DL (ref 8–23)
BUN/CREAT SERPL: 11.4 (ref 7–25)
CALCIUM SERPL-MCNC: 9.9 MG/DL (ref 8.6–10.5)
CHLORIDE SERPL-SCNC: 98 MMOL/L (ref 98–107)
CHOLEST SERPL-MCNC: 126 MG/DL (ref 0–200)
CO2 SERPL-SCNC: 30.3 MMOL/L (ref 22–29)
CREAT SERPL-MCNC: 1.32 MG/DL (ref 0.57–1)
GLOBULIN SER CALC-MCNC: 2.3 GM/DL
GLUCOSE SERPL-MCNC: 254 MG/DL (ref 65–99)
HBA1C MFR BLD: 7.6 % (ref 4.8–5.6)
HDLC SERPL-MCNC: 64 MG/DL (ref 40–60)
INTERPRETATION: NORMAL
LDLC SERPL CALC-MCNC: 45 MG/DL (ref 0–100)
Lab: NORMAL
MICROALBUMIN UR-MCNC: 6.3 UG/ML
POTASSIUM SERPL-SCNC: 3.7 MMOL/L (ref 3.5–5.2)
PROT SERPL-MCNC: 6.4 G/DL (ref 6–8.5)
SODIUM SERPL-SCNC: 140 MMOL/L (ref 136–145)
T3FREE SERPL-MCNC: 2.9 PG/ML (ref 2–4.4)
T4 FREE SERPL-MCNC: 1.78 NG/DL (ref 0.93–1.7)
T4 SERPL-MCNC: 8.06 MCG/DL (ref 4.5–11.7)
THYROGLOB AB SERPL-ACNC: <1 IU/ML (ref 0–0.9)
THYROGLOB SERPL-MCNC: 7.1 NG/ML (ref 1.5–38.5)
TRIGL SERPL-MCNC: 83 MG/DL (ref 0–150)
TSH SERPL DL<=0.005 MIU/L-ACNC: 0.26 MIU/ML (ref 0.27–4.2)
URATE SERPL-MCNC: 5.7 MG/DL (ref 2.4–5.7)
VLDLC SERPL CALC-MCNC: 16.6 MG/DL (ref 5–40)

## 2018-09-28 RX ORDER — ERGOCALCIFEROL 1.25 MG/1
CAPSULE ORAL
Qty: 13 CAPSULE | Refills: 0 | Status: SHIPPED | OUTPATIENT
Start: 2018-09-28 | End: 2018-10-10 | Stop reason: SDUPTHER

## 2018-10-10 ENCOUNTER — OFFICE VISIT (OUTPATIENT)
Dept: ENDOCRINOLOGY | Age: 63
End: 2018-10-10

## 2018-10-10 VITALS
DIASTOLIC BLOOD PRESSURE: 70 MMHG | RESPIRATION RATE: 16 BRPM | BODY MASS INDEX: 29.85 KG/M2 | WEIGHT: 158 LBS | SYSTOLIC BLOOD PRESSURE: 118 MMHG

## 2018-10-10 DIAGNOSIS — E55.9 AVITAMINOSIS D: ICD-10-CM

## 2018-10-10 DIAGNOSIS — E55.9 VITAMIN D DEFICIENCY: ICD-10-CM

## 2018-10-10 DIAGNOSIS — E78.5 DYSLIPIDEMIA: ICD-10-CM

## 2018-10-10 DIAGNOSIS — R53.82 CHRONIC FATIGUE: ICD-10-CM

## 2018-10-10 DIAGNOSIS — E03.9 PRIMARY HYPOTHYROIDISM: ICD-10-CM

## 2018-10-10 DIAGNOSIS — E10.65 UNCONTROLLED TYPE 1 DIABETES MELLITUS WITH HYPERGLYCEMIA (HCC): Primary | ICD-10-CM

## 2018-10-10 DIAGNOSIS — Z46.81 INSULIN PUMP TITRATION: ICD-10-CM

## 2018-10-10 DIAGNOSIS — Z78.0 MENOPAUSE: ICD-10-CM

## 2018-10-10 DIAGNOSIS — I10 ESSENTIAL HYPERTENSION: ICD-10-CM

## 2018-10-10 PROCEDURE — 99214 OFFICE O/P EST MOD 30 MIN: CPT | Performed by: INTERNAL MEDICINE

## 2018-10-10 RX ORDER — INSULIN GLULISINE 100 [IU]/ML
INJECTION, SOLUTION SUBCUTANEOUS
Qty: 90 ML | Refills: 3 | Status: SHIPPED | OUTPATIENT
Start: 2018-10-10 | End: 2019-12-30

## 2018-10-10 RX ORDER — INSULIN GLARGINE 100 [IU]/ML
INJECTION, SOLUTION SUBCUTANEOUS
Qty: 10 ML | Refills: 5 | Status: SHIPPED | OUTPATIENT
Start: 2018-10-10 | End: 2021-11-24 | Stop reason: SDUPTHER

## 2018-10-10 RX ORDER — LEVOTHYROXINE SODIUM 75 MCG
75 TABLET ORAL DAILY
Qty: 90 TABLET | Refills: 1 | Status: SHIPPED | OUTPATIENT
Start: 2018-10-10 | End: 2019-05-16 | Stop reason: SDUPTHER

## 2018-10-10 RX ORDER — BUPROPION HYDROCHLORIDE 300 MG/1
300 TABLET ORAL EVERY MORNING
Qty: 90 TABLET | Refills: 3 | Status: SHIPPED | OUTPATIENT
Start: 2018-10-10 | End: 2019-12-05 | Stop reason: SDUPTHER

## 2018-10-10 RX ORDER — RALOXIFENE HYDROCHLORIDE 60 MG/1
60 TABLET, FILM COATED ORAL DAILY
Qty: 90 TABLET | Refills: 3 | Status: SHIPPED | OUTPATIENT
Start: 2018-10-10 | End: 2019-10-16 | Stop reason: SDUPTHER

## 2018-10-10 RX ORDER — ERGOCALCIFEROL 1.25 MG/1
50000 CAPSULE ORAL
Qty: 13 CAPSULE | Refills: 3 | Status: SHIPPED | OUTPATIENT
Start: 2018-10-10 | End: 2019-10-16 | Stop reason: SDUPTHER

## 2018-10-10 RX ORDER — NEBIVOLOL HYDROCHLORIDE 10 MG/1
10 TABLET ORAL DAILY
Qty: 90 TABLET | Refills: 1 | Status: SHIPPED | OUTPATIENT
Start: 2018-10-10 | End: 2019-05-09 | Stop reason: SDUPTHER

## 2018-10-10 RX ORDER — ROSUVASTATIN CALCIUM 10 MG/1
10 TABLET, COATED ORAL NIGHTLY
Qty: 90 TABLET | Refills: 1 | Status: SHIPPED | OUTPATIENT
Start: 2018-10-10 | End: 2019-05-09 | Stop reason: SDUPTHER

## 2018-10-10 NOTE — PROGRESS NOTES
Subjective   Dayanna Lewis is a 62 y.o. female seen for follow up for DM1, osteoporosis, vit d deficiency, lab review. Patient is checking BG 3 times a day. She is currently using a Medtronic insulin pump and changing pump sites every 3 days. She denies any problems or concerns.     History of Present Illness this is a 62-year-old female known patient with type I diabetes hypertension and dyslipidemia as well as hypothyroidism and osteoporosis with vitamin D deficiency.  Over the course of last 6 months she has had no significant health problems for which to go to emergency room or hospital.  She is very comfortable using her Medtronic insulin pump.  /70   Resp 16   Wt 71.7 kg (158 lb)   LMP  (LMP Unknown)   BMI 29.85 kg/m²   No Known Allergies    Current Outpatient Prescriptions:   •  acetone, urine, test strip, Use as directed, Disp: 50 each, Rfl: 1  •  aspirin 81 MG tablet, Take 1 tablet by mouth daily., Disp: , Rfl:   •  buPROPion XL (WELLBUTRIN XL) 300 MG 24 hr tablet, TAKE 1 TABLET BY MOUTH EVERY MORNING, Disp: 90 tablet, Rfl: 0  •  BYSTOLIC 10 MG tablet, Take 1 tablet by mouth Daily., Disp: 90 tablet, Rfl: 1  •  folic acid (FOLVITE) 1 MG tablet, TAKE 1 TABLET BY MOUTH DAILY, Disp: 90 tablet, Rfl: 0  •  glucagon (GLUCAGON EMERGENCY) 1 MG injection, Use as directed, Disp: 2 kit, Rfl: 1  •  glucose blood test strip, Mari Contour Next Test In Vitro Strip; Patient Sig: Mari Contour Next Test In Vitro Strip test 8 times daily; 240; 5; 10-Nov-2014; Active, Disp: , Rfl:   •  hydrochlorothiazide (HYDRODIURIL) 25 MG tablet, TK 1/2 TO 1 T PO QD, Disp: , Rfl: 0  •  insulin glulisine (APIDRA) 100 UNIT/ML injection, Used in an insulin pump up to 100 units daily, Disp: 90 mL, Rfl: 1  •  LANTUS 100 UNIT/ML injection, INJECT 21 UNITS UNDER THE SKIN EVERY DAY., Disp: 10 mL, Rfl: 0  •  Multiple Minerals-Vitamins (CITRACAL PLUS BONE DENSITY) tablet, Take by mouth., Disp: , Rfl:   •  raloxifene (EVISTA) 60 MG tablet,  Take 1 tablet by mouth Daily., Disp: 90 tablet, Rfl: 1  •  raNITIdine (ZANTAC) 150 MG tablet, Take 150 mg by mouth As Needed for Heartburn., Disp: , Rfl:   •  rosuvastatin (CRESTOR) 10 MG tablet, Take 1 tablet by mouth Every Night., Disp: 90 tablet, Rfl: 1  •  SYNTHROID 75 MCG tablet, Take 1 tablet by mouth Daily., Disp: 90 tablet, Rfl: 1  •  vitamin D (ERGOCALCIFEROL) 93072 units capsule capsule, TAKE 1 CAPSULE BY MOUTH EVERY 7 DAYS, Disp: 13 capsule, Rfl: 0  •  vitamin E 400 UNIT capsule, Take 1 capsule by mouth daily., Disp: , Rfl:     The following portions of the patient's history were reviewed and updated as appropriate: allergies, current medications, past family history, past medical history, past social history, past surgical history and problem list.    Review of Systems   Constitutional: Negative.  Negative for fatigue.   HENT: Negative.    Eyes: Negative.    Respiratory: Negative.    Cardiovascular: Negative.  Negative for chest pain.   Gastrointestinal: Negative.    Endocrine: Negative.  Negative for polydipsia, polyphagia and polyuria.   Genitourinary: Negative.    Musculoskeletal: Negative.    Skin: Negative.  Negative for pallor.   Allergic/Immunologic: Negative.    Neurological: Negative.  Negative for dizziness, tremors, seizures, speech difficulty, weakness and headaches.   Hematological: Negative.    Psychiatric/Behavioral: Negative.  Negative for confusion. The patient is not nervous/anxious.        Objective   Physical Exam   Constitutional: She is oriented to person, place, and time. She appears well-developed and well-nourished. No distress.   HENT:   Head: Normocephalic and atraumatic.   Right Ear: External ear normal.   Left Ear: External ear normal.   Nose: Nose normal.   Mouth/Throat: Oropharynx is clear and moist. No oropharyngeal exudate.   Eyes: Pupils are equal, round, and reactive to light. Conjunctivae and EOM are normal. Right eye exhibits no discharge. Left eye exhibits no  discharge. No scleral icterus.   Neck: Normal range of motion. Neck supple. No JVD present. No tracheal deviation present. No thyromegaly present.   Cardiovascular: Normal rate, regular rhythm, normal heart sounds and intact distal pulses.  Exam reveals no gallop and no friction rub.    No murmur heard.  Pulmonary/Chest: Effort normal and breath sounds normal. No stridor. No respiratory distress. She has no wheezes. She has no rales. She exhibits no tenderness.   Abdominal: Soft. Bowel sounds are normal. She exhibits no distension and no mass. There is no tenderness. There is no rebound and no guarding. No hernia.   Musculoskeletal: Normal range of motion. She exhibits no edema, tenderness or deformity.   Lymphadenopathy:     She has no cervical adenopathy.   Neurological: She is alert and oriented to person, place, and time. She has normal reflexes. She displays normal reflexes. No cranial nerve deficit or sensory deficit. She exhibits normal muscle tone. Coordination normal.   Skin: Skin is warm and dry. No rash noted. She is not diaphoretic. No erythema. No pallor.   Psychiatric: She has a normal mood and affect. Her behavior is normal. Judgment and thought content normal.   Nursing note and vitals reviewed.       Results for orders placed or performed in visit on 08/13/18   T3, Free   Result Value Ref Range    T3, Free 2.9 2.0 - 4.4 pg/mL   T4 & TSH ([facility])   Result Value Ref Range    TSH 0.258 (L) 0.270 - 4.200 mIU/mL    T4, Total 8.06 4.50 - 11.70 mcg/dL   T4, Free   Result Value Ref Range    Free T4 1.78 (H) 0.93 - 1.70 ng/dL   Thyroglobulin With Anti-TG   Result Value Ref Range    Thyroglobulin Ab <1.0 0.0 - 0.9 IU/mL   Uric Acid   Result Value Ref Range    Uric Acid 5.7 2.4 - 5.7 mg/dL   Vitamin D 25 Hydroxy   Result Value Ref Range    25 Hydroxy, Vitamin D 66.9 30.0 - 100.0 ng/ml   Comprehensive Metabolic Panel   Result Value Ref Range    Glucose 254 (H) 65 - 99 mg/dL    BUN 15 8 - 23 mg/dL    Creatinine  1.32 (H) 0.57 - 1.00 mg/dL    eGFR Non African Am 41 (L) >60 mL/min/1.73    eGFR African Am 50 (L) >60 mL/min/1.73    BUN/Creatinine Ratio 11.4 7.0 - 25.0    Sodium 140 136 - 145 mmol/L    Potassium 3.7 3.5 - 5.2 mmol/L    Chloride 98 98 - 107 mmol/L    Total CO2 30.3 (H) 22.0 - 29.0 mmol/L    Calcium 9.9 8.6 - 10.5 mg/dL    Total Protein 6.4 6.0 - 8.5 g/dL    Albumin 4.10 3.50 - 5.20 g/dL    Globulin 2.3 gm/dL    A/G Ratio 1.8 g/dL    Total Bilirubin 0.6 0.1 - 1.2 mg/dL    Alkaline Phosphatase 76 39 - 117 U/L    AST (SGOT) 25 1 - 32 U/L    ALT (SGPT) 22 1 - 33 U/L   Hemoglobin A1c   Result Value Ref Range    Hemoglobin A1C 7.60 (H) 4.80 - 5.60 %   Lipid Panel   Result Value Ref Range    Total Cholesterol 126 0 - 200 mg/dL    Triglycerides 83 0 - 150 mg/dL    HDL Cholesterol 64 (H) 40 - 60 mg/dL    VLDL Cholesterol 16.6 5 - 40 mg/dL    LDL Cholesterol  45 0 - 100 mg/dL   Cardiovascular Risk Assessment   Result Value Ref Range    Interpretation Note    Diabetes Patient Education   Result Value Ref Range    PDF Image Not applicable    MicroAlbumin, Urine, Random   Result Value Ref Range    Microalbumin, Urine 6.3 Not Estab. ug/mL   Thyroglobulin By NATALIA   Result Value Ref Range    THYROGLOBULIN BY NATALIA 7.1 1.5 - 38.5 ng/mL         Assessment/Plan   Diagnoses and all orders for this visit:    Uncontrolled type 1 diabetes mellitus with hyperglycemia (CMS/HCC)  -     T3, Free; Future  -     T4 & TSH ([facility]); Future  -     T4, Free; Future  -     Uric Acid; Future  -     Vitamin D 25 Hydroxy; Future  -     Comprehensive Metabolic Panel; Future  -     Hemoglobin A1c; Future  -     Lipid Panel; Future  -     MicroAlbumin, Urine, Random - Urine, Clean Catch; Future    Essential hypertension  -     T3, Free; Future  -     T4 & TSH ([facility]); Future  -     T4, Free; Future  -     Uric Acid; Future  -     Vitamin D 25 Hydroxy; Future  -     Comprehensive Metabolic Panel; Future  -     Hemoglobin A1c; Future  -     Lipid Panel;  Future  -     MicroAlbumin, Urine, Random - Urine, Clean Catch; Future  -     raloxifene (EVISTA) 60 MG tablet; Take 1 tablet by mouth Daily.    Vitamin D deficiency  -     T3, Free; Future  -     T4 & TSH ([facility]); Future  -     T4, Free; Future  -     Uric Acid; Future  -     Vitamin D 25 Hydroxy; Future  -     Comprehensive Metabolic Panel; Future  -     Hemoglobin A1c; Future  -     Lipid Panel; Future  -     MicroAlbumin, Urine, Random - Urine, Clean Catch; Future    Primary hypothyroidism  -     T3, Free; Future  -     T4 & TSH ([facility]); Future  -     T4, Free; Future  -     Uric Acid; Future  -     Vitamin D 25 Hydroxy; Future  -     Comprehensive Metabolic Panel; Future  -     Hemoglobin A1c; Future  -     Lipid Panel; Future  -     MicroAlbumin, Urine, Random - Urine, Clean Catch; Future    Menopause  -     T3, Free; Future  -     T4 & TSH ([facility]); Future  -     T4, Free; Future  -     Uric Acid; Future  -     Vitamin D 25 Hydroxy; Future  -     Comprehensive Metabolic Panel; Future  -     Hemoglobin A1c; Future  -     Lipid Panel; Future  -     MicroAlbumin, Urine, Random - Urine, Clean Catch; Future    Dyslipidemia  -     T3, Free; Future  -     T4 & TSH ([facility]); Future  -     T4, Free; Future  -     Uric Acid; Future  -     Vitamin D 25 Hydroxy; Future  -     Comprehensive Metabolic Panel; Future  -     Hemoglobin A1c; Future  -     Lipid Panel; Future  -     MicroAlbumin, Urine, Random - Urine, Clean Catch; Future  -     raloxifene (EVISTA) 60 MG tablet; Take 1 tablet by mouth Daily.    Chronic fatigue  -     T3, Free; Future  -     T4 & TSH ([facility]); Future  -     T4, Free; Future  -     Uric Acid; Future  -     Vitamin D 25 Hydroxy; Future  -     Comprehensive Metabolic Panel; Future  -     Hemoglobin A1c; Future  -     Lipid Panel; Future  -     MicroAlbumin, Urine, Random - Urine, Clean Catch; Future  -     raloxifene (EVISTA) 60 MG tablet; Take 1 tablet by mouth Daily.    Avitaminosis D  -      raloxifene (EVISTA) 60 MG tablet; Take 1 tablet by mouth Daily.    Insulin pump titration  -     raloxifene (EVISTA) 60 MG tablet; Take 1 tablet by mouth Daily.    Other orders  -     vitamin D (ERGOCALCIFEROL) 09375 units capsule capsule; Take 1 capsule by mouth Every 7 (Seven) Days.  -     SYNTHROID 75 MCG tablet; Take 1 tablet by mouth Daily.  -     rosuvastatin (CRESTOR) 10 MG tablet; Take 1 tablet by mouth Every Night.  -     APIDRA 100 UNIT/ML injection; Used in an insulin pump up to 100 units daily  -     glucagon (GLUCAGON EMERGENCY) 1 MG injection; Use as directed  -     BYSTOLIC 10 MG tablet; Take 1 tablet by mouth Daily.  -     buPROPion XL (WELLBUTRIN XL) 300 MG 24 hr tablet; Take 1 tablet by mouth Every Morning.  -     LANTUS 100 UNIT/ML injection; 21 units subcutaneous daily               In summary I saw and examined this 62-year-old female for above-mentioned problems.  I reviewed her laboratory evaluations of 09/26/2018 and provided her with a hard copy of it.  Overall she is clinically and metabolically stable and with her hemoglobin A1c being 7.60 we will go ahead and continue all her current prescriptions.  She will see Ms. Pamela Najera in 3 months and myself 6 months or sooner if needed with laboratory evaluation prior to each office visit.

## 2018-12-18 RX ORDER — FOLIC ACID 1 MG/1
TABLET ORAL
Qty: 90 TABLET | Refills: 0 | Status: SHIPPED | OUTPATIENT
Start: 2018-12-18 | End: 2019-01-09 | Stop reason: SDUPTHER

## 2018-12-26 DIAGNOSIS — E55.9 VITAMIN D DEFICIENCY: Primary | ICD-10-CM

## 2018-12-26 DIAGNOSIS — E10.65 UNCONTROLLED TYPE 1 DIABETES MELLITUS WITH HYPERGLYCEMIA (HCC): ICD-10-CM

## 2018-12-26 DIAGNOSIS — E03.9 PRIMARY HYPOTHYROIDISM: ICD-10-CM

## 2018-12-28 ENCOUNTER — LAB (OUTPATIENT)
Dept: ENDOCRINOLOGY | Age: 63
End: 2018-12-28

## 2018-12-28 DIAGNOSIS — E03.9 PRIMARY HYPOTHYROIDISM: ICD-10-CM

## 2018-12-28 DIAGNOSIS — E10.65 UNCONTROLLED TYPE 1 DIABETES MELLITUS WITH HYPERGLYCEMIA (HCC): ICD-10-CM

## 2018-12-28 DIAGNOSIS — E55.9 VITAMIN D DEFICIENCY: ICD-10-CM

## 2018-12-30 LAB
25(OH)D3+25(OH)D2 SERPL-MCNC: 57 NG/ML (ref 30–100)
ALBUMIN SERPL-MCNC: 3.8 G/DL (ref 3.5–5.2)
ALBUMIN/GLOB SERPL: 1.4 G/DL
ALP SERPL-CCNC: 87 U/L (ref 39–117)
ALT SERPL-CCNC: 13 U/L (ref 1–33)
AST SERPL-CCNC: 21 U/L (ref 1–32)
BILIRUB SERPL-MCNC: 0.4 MG/DL (ref 0.1–1.2)
BUN SERPL-MCNC: 17 MG/DL (ref 8–23)
BUN/CREAT SERPL: 12.6 (ref 7–25)
C PEPTIDE SERPL-MCNC: <0.1 NG/ML (ref 1.1–4.4)
CALCIUM SERPL-MCNC: 9.9 MG/DL (ref 8.6–10.5)
CHLORIDE SERPL-SCNC: 102 MMOL/L (ref 98–107)
CHOLEST SERPL-MCNC: 128 MG/DL (ref 0–200)
CO2 SERPL-SCNC: 27.5 MMOL/L (ref 22–29)
CREAT SERPL-MCNC: 1.35 MG/DL (ref 0.57–1)
FT4I SERPL CALC-MCNC: 2.3 (ref 1.2–4.9)
GLOBULIN SER CALC-MCNC: 2.7 GM/DL
GLUCOSE SERPL-MCNC: 238 MG/DL (ref 65–99)
HBA1C MFR BLD: 7.78 % (ref 4.8–5.6)
HDLC SERPL-MCNC: 62 MG/DL (ref 40–60)
INTERPRETATION: NORMAL
LDLC SERPL CALC-MCNC: 45 MG/DL (ref 0–100)
Lab: NORMAL
POTASSIUM SERPL-SCNC: 3.4 MMOL/L (ref 3.5–5.2)
PROT SERPL-MCNC: 6.5 G/DL (ref 6–8.5)
SODIUM SERPL-SCNC: 142 MMOL/L (ref 136–145)
T3FREE SERPL-MCNC: 2.6 PG/ML (ref 2–4.4)
T3RU NFR SERPL: 30 % (ref 24–39)
T4 FREE SERPL-MCNC: 1.49 NG/DL (ref 0.93–1.7)
T4 SERPL-MCNC: 7.6 UG/DL (ref 4.5–12)
THYROGLOB AB SERPL-ACNC: <1 IU/ML
THYROGLOB SERPL-MCNC: 10 NG/ML
THYROGLOB SERPL-MCNC: NORMAL NG/ML
TRIGL SERPL-MCNC: 104 MG/DL (ref 0–150)
TSH SERPL DL<=0.005 MIU/L-ACNC: 0.33 UIU/ML (ref 0.45–4.5)
VLDLC SERPL CALC-MCNC: 20.8 MG/DL (ref 5–40)

## 2019-01-09 ENCOUNTER — OFFICE VISIT (OUTPATIENT)
Dept: ENDOCRINOLOGY | Age: 64
End: 2019-01-09

## 2019-01-09 VITALS
DIASTOLIC BLOOD PRESSURE: 72 MMHG | HEIGHT: 61 IN | WEIGHT: 156 LBS | SYSTOLIC BLOOD PRESSURE: 126 MMHG | BODY MASS INDEX: 29.45 KG/M2

## 2019-01-09 DIAGNOSIS — Z46.81 INSULIN PUMP TITRATION: ICD-10-CM

## 2019-01-09 DIAGNOSIS — E55.9 VITAMIN D DEFICIENCY: ICD-10-CM

## 2019-01-09 DIAGNOSIS — I10 ESSENTIAL HYPERTENSION: ICD-10-CM

## 2019-01-09 DIAGNOSIS — E10.65 UNCONTROLLED TYPE 1 DIABETES MELLITUS WITH HYPERGLYCEMIA (HCC): Primary | ICD-10-CM

## 2019-01-09 DIAGNOSIS — E78.5 DYSLIPIDEMIA: ICD-10-CM

## 2019-01-09 DIAGNOSIS — E03.9 PRIMARY HYPOTHYROIDISM: ICD-10-CM

## 2019-01-09 PROCEDURE — 99214 OFFICE O/P EST MOD 30 MIN: CPT | Performed by: NURSE PRACTITIONER

## 2019-01-09 NOTE — PROGRESS NOTES
"Ramya Lewis is a 63 y.o. female is here today for follow-up.  Chief Complaint   Patient presents with   • Diabetes     recent labs, pump download, testing BG    • Hyperlipidemia   • Hypertension   • Hypothyroidism   • Vitamin D Deficiency     /72   Ht 154.9 cm (61\")   Wt 70.8 kg (156 lb)   LMP  (LMP Unknown)   BMI 29.48 kg/m²   Current Outpatient Medications on File Prior to Visit   Medication Sig   • acetone, urine, test strip Use as directed   • APIDRA 100 UNIT/ML injection Used in an insulin pump up to 100 units daily   • aspirin 81 MG tablet Take 1 tablet by mouth daily.   • buPROPion XL (WELLBUTRIN XL) 300 MG 24 hr tablet Take 1 tablet by mouth Every Morning.   • BYSTOLIC 10 MG tablet Take 1 tablet by mouth Daily.   • folic acid (FOLVITE) 1 MG tablet TAKE 1 TABLET BY MOUTH DAILY   • glucagon (GLUCAGON EMERGENCY) 1 MG injection Use as directed   • glucose blood test strip Mari Contour Next Test In Vitro Strip; Patient Sig: Mari Contour Next Test In Vitro Strip test 8 times daily; 240; 5; 10-Nov-2014; Active   • hydrochlorothiazide (HYDRODIURIL) 25 MG tablet TK 1/2 TO 1 T PO QD   • LANTUS 100 UNIT/ML injection 21 units subcutaneous daily   • Multiple Minerals-Vitamins (CITRACAL PLUS BONE DENSITY) tablet Take by mouth.   • raloxifene (EVISTA) 60 MG tablet Take 1 tablet by mouth Daily.   • raNITIdine (ZANTAC) 150 MG tablet Take 150 mg by mouth As Needed for Heartburn.   • rosuvastatin (CRESTOR) 10 MG tablet Take 1 tablet by mouth Every Night.   • SYNTHROID 75 MCG tablet Take 1 tablet by mouth Daily.   • vitamin D (ERGOCALCIFEROL) 06525 units capsule capsule Take 1 capsule by mouth Every 7 (Seven) Days.   • vitamin E 400 UNIT capsule Take 1 capsule by mouth daily.   • [DISCONTINUED] folic acid (FOLVITE) 1 MG tablet TAKE 1 TABLET BY MOUTH DAILY   • [DISCONTINUED] insulin glulisine (APIDRA) 100 UNIT/ML injection USE IN INSULIN PUMP UP  UNITS DAILY     No current facility-administered " medications on file prior to visit.      Family History   Problem Relation Age of Onset   • Depression Mother    • Glaucoma Mother    • Diabetes Father    • Glaucoma Father    • Hypertension Father    • Diabetes Sister    • Thyroid disease Sister    • Diabetes Brother      Social History     Tobacco Use   • Smoking status: Never Smoker   Substance Use Topics   • Alcohol use: No   • Drug use: Not on file     No Known Allergies      History of Present Illness  Encounter Diagnoses   Name Primary?   • Essential hypertension    • Vitamin D deficiency    • Uncontrolled type 1 diabetes mellitus with hyperglycemia (CMS/[facility]) Yes   • Primary hypothyroidism    • Dyslipidemia    • Insulin pump titration      63-year-old feel a patient here today for routine follow-up visit.  She is on a Medtronic insulin pump and a continuous glucose monitoring sensor.  She is monitor her blood sugars closely.  She is having some problems with low blood sugars in the morning.  We discussed decreasing her basal rate starting at 3 AM.  She will also follow with [employer] at today's visit to evaluate for any additional changes.  She is taking her medications as prescribed.  She has been sick recently with an upper respiratory infection but is starting to feel better.  Upgrade however patient has not due for an upgrade at this time.    The following portions of the patient's history were reviewed and updated as appropriate: allergies, current medications, past family history, past medical history, past social history, past surgical history and problem list.    Review of Systems   Constitutional: Negative for fatigue.   HENT: Negative for trouble swallowing.    Eyes: Negative for visual disturbance.   Cardiovascular: Negative for leg swelling.   Endocrine: Negative for polydipsia.   Skin: Negative for wound.   Neurological: Negative for numbness.       Objective   Physical Exam   Constitutional: She is oriented to person, place, and time. She  appears well-developed and well-nourished. No distress.   HENT:   Head: Normocephalic and atraumatic.   Right Ear: External ear normal.   Left Ear: External ear normal.   Nose: Nose normal.   Mouth/Throat: Oropharynx is clear and moist. No oropharyngeal exudate.   Eyes: Conjunctivae and EOM are normal. Pupils are equal, round, and reactive to light. Right eye exhibits no discharge. Left eye exhibits no discharge. No scleral icterus.   Neck: Trachea normal, normal range of motion and full passive range of motion without pain. Neck supple. No JVD present. No tracheal tenderness present. Carotid bruit is not present. No tracheal deviation, no edema and no erythema present. No thyroid mass and no thyromegaly present.   Cardiovascular: Normal rate, regular rhythm, normal heart sounds and intact distal pulses. Exam reveals no gallop and no friction rub.   No murmur heard.  Pulmonary/Chest: Effort normal and breath sounds normal. No stridor. No respiratory distress. She has no wheezes. She has no rales. She exhibits no tenderness.   Abdominal: Soft. Bowel sounds are normal. She exhibits no distension and no mass. There is no tenderness. There is no rebound and no guarding. No hernia.   Musculoskeletal: Normal range of motion. She exhibits no edema, tenderness or deformity.   Lymphadenopathy:     She has no cervical adenopathy.   Neurological: She is alert and oriented to person, place, and time. She has normal reflexes. She displays normal reflexes. No cranial nerve deficit. She exhibits normal muscle tone. Coordination normal.   Skin: Skin is warm and dry. No rash noted. She is not diaphoretic. No erythema. No pallor.   Psychiatric: She has a normal mood and affect. Her behavior is normal. Judgment and thought content normal.   Nursing note and vitals reviewed.      Results for orders placed or performed in visit on 12/28/18   Thyroid Panel With TSH   Result Value Ref Range    TSH 0.325 (L) 0.450 - 4.500 uIU/mL    T4,  Total 7.6 4.5 - 12.0 ug/dL    T3 Uptake 30 24 - 39 %    Free Thyroxine Index 2.3 1.2 - 4.9   Comprehensive Thyroglobulin   Result Value Ref Range    Thyroglobulin Ab <1.0 IU/mL    Thyroglobulin 10.0 ng/mL    Thyroglobulin (TG-MELQUIADES) Comment ng/mL   T4, Free   Result Value Ref Range    Free T4 1.49 0.93 - 1.70 ng/dL   T3, Free   Result Value Ref Range    T3, Free 2.6 2.0 - 4.4 pg/mL   C-Peptide   Result Value Ref Range    C-Peptide <0.1 (L) 1.1 - 4.4 ng/mL   Hemoglobin A1c   Result Value Ref Range    Hemoglobin A1C 7.78 (H) 4.80 - 5.60 %   Vitamin D 25 Hydroxy   Result Value Ref Range    25 Hydroxy, Vitamin D 57.0 30.0 - 100.0 ng/ml   Lipid Panel   Result Value Ref Range    Total Cholesterol 128 0 - 200 mg/dL    Triglycerides 104 0 - 150 mg/dL    HDL Cholesterol 62 (H) 40 - 60 mg/dL    VLDL Cholesterol 20.8 5 - 40 mg/dL    LDL Cholesterol  45 0 - 100 mg/dL   Comprehensive Metabolic Panel   Result Value Ref Range    Glucose 238 (H) 65 - 99 mg/dL    BUN 17 8 - 23 mg/dL    Creatinine 1.35 (H) 0.57 - 1.00 mg/dL    eGFR Non African Am 40 (L) >60 mL/min/1.73    eGFR  Am 48 (L) >60 mL/min/1.73    BUN/Creatinine Ratio 12.6 7.0 - 25.0    Sodium 142 136 - 145 mmol/L    Potassium 3.4 (L) 3.5 - 5.2 mmol/L    Chloride 102 98 - 107 mmol/L    Total CO2 27.5 22.0 - 29.0 mmol/L    Calcium 9.9 8.6 - 10.5 mg/dL    Total Protein 6.5 6.0 - 8.5 g/dL    Albumin 3.80 3.50 - 5.20 g/dL    Globulin 2.7 gm/dL    A/G Ratio 1.4 g/dL    Total Bilirubin 0.4 0.1 - 1.2 mg/dL    Alkaline Phosphatase 87 39 - 117 U/L    AST (SGOT) 21 1 - 32 U/L    ALT (SGPT) 13 1 - 33 U/L   Cardiovascular Risk Assessment   Result Value Ref Range    Interpretation Note    Diabetes Patient Education   Result Value Ref Range    PDF Image Not applicable        Assessment/Plan   Problems Addressed this Visit        Cardiovascular and Mediastinum    HTN (hypertension)       Digestive    Vitamin D deficiency       Endocrine    Diabetes mellitus type 1, uncontrolled  (CMS/[facility]) - Primary    Primary hypothyroidism       Other    Dyslipidemia    Insulin pump titration          In summary, patient was seen and examined.  Metabolic issues stable.  She will follow-up with the [employer] clinical educator after today's visit to discuss any further adjustments.  Her pump settings at 3 AM was decreased from 1.2 to 1.15.  Her hemoglobin A1c is slightly above goal.  She does have some excursions.  However she states she is overly cautious with having low blood sugars.  Her thyroid hormones are an satisfactory range as is her cholesterol.  Her kidney function is stable.  She will follow-up as scheduled with labs prior.  I've encouraged her to recheck to the office should she have any questions or concerns or feel like she needs to make further adjustments to her pump settings

## 2019-03-25 RX ORDER — FOLIC ACID 1 MG/1
TABLET ORAL
Qty: 90 TABLET | Refills: 0 | Status: SHIPPED | OUTPATIENT
Start: 2019-03-25 | End: 2019-04-10 | Stop reason: SDUPTHER

## 2019-03-26 ENCOUNTER — RESULTS ENCOUNTER (OUTPATIENT)
Dept: ENDOCRINOLOGY | Age: 64
End: 2019-03-26

## 2019-03-26 DIAGNOSIS — E03.9 PRIMARY HYPOTHYROIDISM: ICD-10-CM

## 2019-03-26 DIAGNOSIS — E10.65 UNCONTROLLED TYPE 1 DIABETES MELLITUS WITH HYPERGLYCEMIA (HCC): ICD-10-CM

## 2019-03-26 DIAGNOSIS — I10 ESSENTIAL HYPERTENSION: ICD-10-CM

## 2019-03-26 DIAGNOSIS — E55.9 VITAMIN D DEFICIENCY: ICD-10-CM

## 2019-03-26 DIAGNOSIS — Z78.0 MENOPAUSE: ICD-10-CM

## 2019-03-26 DIAGNOSIS — E78.5 DYSLIPIDEMIA: ICD-10-CM

## 2019-03-26 DIAGNOSIS — R53.82 CHRONIC FATIGUE: ICD-10-CM

## 2019-03-28 LAB
25(OH)D3+25(OH)D2 SERPL-MCNC: 48.1 NG/ML (ref 30–100)
ALBUMIN SERPL-MCNC: 4 G/DL (ref 3.5–5.2)
ALBUMIN/GLOB SERPL: 1.8 G/DL
ALP SERPL-CCNC: 79 U/L (ref 39–117)
ALT SERPL-CCNC: 14 U/L (ref 1–33)
AST SERPL-CCNC: 21 U/L (ref 1–32)
BILIRUB SERPL-MCNC: 0.3 MG/DL (ref 0.2–1.2)
BUN SERPL-MCNC: 14 MG/DL (ref 8–23)
BUN/CREAT SERPL: 9 (ref 7–25)
CALCIUM SERPL-MCNC: 9 MG/DL (ref 8.6–10.5)
CHLORIDE SERPL-SCNC: 97 MMOL/L (ref 98–107)
CHOLEST SERPL-MCNC: 129 MG/DL (ref 0–200)
CO2 SERPL-SCNC: 30.7 MMOL/L (ref 22–29)
CREAT SERPL-MCNC: 1.56 MG/DL (ref 0.57–1)
GLOBULIN SER CALC-MCNC: 2.2 GM/DL
GLUCOSE SERPL-MCNC: 290 MG/DL (ref 65–99)
HBA1C MFR BLD: 8.19 % (ref 4.8–5.6)
HDLC SERPL-MCNC: 60 MG/DL (ref 40–60)
INTERPRETATION: NORMAL
LDLC SERPL CALC-MCNC: 52 MG/DL (ref 0–100)
Lab: NORMAL
MICROALBUMIN UR-MCNC: 5.6 UG/ML
POTASSIUM SERPL-SCNC: 3.5 MMOL/L (ref 3.5–5.2)
PROT SERPL-MCNC: 6.2 G/DL (ref 6–8.5)
SODIUM SERPL-SCNC: 141 MMOL/L (ref 136–145)
T3FREE SERPL-MCNC: 2.7 PG/ML (ref 2–4.4)
T4 FREE SERPL-MCNC: 1.5 NG/DL (ref 0.93–1.7)
T4 SERPL-MCNC: 8.19 MCG/DL (ref 4.5–11.7)
TRIGL SERPL-MCNC: 86 MG/DL (ref 0–150)
TSH SERPL DL<=0.005 MIU/L-ACNC: 0.71 MIU/ML (ref 0.27–4.2)
URATE SERPL-MCNC: 5.9 MG/DL (ref 2.4–5.7)
VLDLC SERPL CALC-MCNC: 17.2 MG/DL (ref 5–40)

## 2019-04-10 ENCOUNTER — OFFICE VISIT (OUTPATIENT)
Dept: ENDOCRINOLOGY | Age: 64
End: 2019-04-10

## 2019-04-10 VITALS
WEIGHT: 158.6 LBS | SYSTOLIC BLOOD PRESSURE: 124 MMHG | RESPIRATION RATE: 16 BRPM | DIASTOLIC BLOOD PRESSURE: 70 MMHG | BODY MASS INDEX: 29.94 KG/M2 | HEIGHT: 61 IN

## 2019-04-10 DIAGNOSIS — E78.5 DYSLIPIDEMIA: ICD-10-CM

## 2019-04-10 DIAGNOSIS — Z78.0 MENOPAUSE: ICD-10-CM

## 2019-04-10 DIAGNOSIS — E10.65 UNCONTROLLED TYPE 1 DIABETES MELLITUS WITH HYPERGLYCEMIA (HCC): Primary | ICD-10-CM

## 2019-04-10 DIAGNOSIS — Z46.81 INSULIN PUMP TITRATION: ICD-10-CM

## 2019-04-10 DIAGNOSIS — R53.82 CHRONIC FATIGUE: ICD-10-CM

## 2019-04-10 DIAGNOSIS — I10 ESSENTIAL HYPERTENSION: ICD-10-CM

## 2019-04-10 DIAGNOSIS — E55.9 VITAMIN D DEFICIENCY: ICD-10-CM

## 2019-04-10 DIAGNOSIS — M81.0 OSTEOPOROSIS, UNSPECIFIED OSTEOPOROSIS TYPE, UNSPECIFIED PATHOLOGICAL FRACTURE PRESENCE: ICD-10-CM

## 2019-04-10 DIAGNOSIS — E03.9 PRIMARY HYPOTHYROIDISM: ICD-10-CM

## 2019-04-10 PROCEDURE — 99214 OFFICE O/P EST MOD 30 MIN: CPT | Performed by: INTERNAL MEDICINE

## 2019-04-10 NOTE — PROGRESS NOTES
"Subjective   Dayanna Lewis is a 63 y.o. female seen for follow up for DM1, HTN, osteoporosis, vit d deficiency, lab review. Patient is currently using a [employer] insulin pump and IPRO CGM and changing pump sites every 3 days. She is checking BG 3 times a day. She denies any problems or concerns.     History of Present Illness this is a 63-year-old female known patient with type 1 diabetes hypertension and dyslipidemia as well as menopausal symptoms and osteoporosis postmenopausally with vitamin D deficiency.  Over the course of last 6 months she has had no significant health problems for which to go to the emergency room or hospital.  She is on [employer] insulin pump.    /70   Resp 16   Ht 154.9 cm (61\")   Wt 71.9 kg (158 lb 9.6 oz)   LMP  (LMP Unknown)   BMI 29.97 kg/m²      No Known Allergies    Current Outpatient Medications:   •  acetone, urine, test strip, Use as directed, Disp: 50 each, Rfl: 1  •  APIDRA 100 UNIT/ML injection, Used in an insulin pump up to 100 units daily, Disp: 90 mL, Rfl: 3  •  aspirin 81 MG tablet, Take 1 tablet by mouth daily., Disp: , Rfl:   •  buPROPion XL (WELLBUTRIN XL) 300 MG 24 hr tablet, Take 1 tablet by mouth Every Morning., Disp: 90 tablet, Rfl: 3  •  BYSTOLIC 10 MG tablet, Take 1 tablet by mouth Daily., Disp: 90 tablet, Rfl: 1  •  folic acid (FOLVITE) 1 MG tablet, TAKE 1 TABLET BY MOUTH DAILY, Disp: 90 tablet, Rfl: 0  •  glucagon (GLUCAGON EMERGENCY) 1 MG injection, Use as directed, Disp: 2 kit, Rfl: 5  •  glucose blood test strip, Mari Contour Next Test In Vitro Strip; Patient Sig: Mari Contour Next Test In Vitro Strip test 8 times daily; 240; 5; 10-Nov-2014; Active, Disp: , Rfl:   •  hydrochlorothiazide (HYDRODIURIL) 25 MG tablet, TK 1/2 TO 1 T PO QD, Disp: , Rfl: 0  •  LANTUS 100 UNIT/ML injection, 21 units subcutaneous daily, Disp: 10 mL, Rfl: 5  •  Multiple Minerals-Vitamins (CITRACAL PLUS BONE DENSITY) tablet, Take by mouth., Disp: , Rfl:   •  raloxifene " (EVISTA) 60 MG tablet, Take 1 tablet by mouth Daily., Disp: 90 tablet, Rfl: 3  •  raNITIdine (ZANTAC) 150 MG tablet, Take 150 mg by mouth As Needed for Heartburn., Disp: , Rfl:   •  rosuvastatin (CRESTOR) 10 MG tablet, Take 1 tablet by mouth Every Night., Disp: 90 tablet, Rfl: 1  •  SYNTHROID 75 MCG tablet, Take 1 tablet by mouth Daily., Disp: 90 tablet, Rfl: 1  •  vitamin D (ERGOCALCIFEROL) 20846 units capsule capsule, Take 1 capsule by mouth Every 7 (Seven) Days., Disp: 13 capsule, Rfl: 3  •  vitamin E 400 UNIT capsule, Take 1 capsule by mouth daily., Disp: , Rfl:       The following portions of the patient's history were reviewed and updated as appropriate: allergies, current medications, past family history, past medical history, past social history, past surgical history and problem list.    Review of Systems   Constitutional: Negative.    HENT: Negative.    Eyes: Negative.    Respiratory: Negative.    Cardiovascular: Negative.    Gastrointestinal: Negative.    Endocrine: Negative.    Genitourinary: Negative.    Musculoskeletal: Negative.    Skin: Negative.    Allergic/Immunologic: Negative.    Neurological: Negative.    Hematological: Negative.    Psychiatric/Behavioral: Negative.        Objective   Physical Exam   Constitutional: She is oriented to person, place, and time. She appears well-developed and well-nourished. No distress.   HENT:   Head: Normocephalic and atraumatic.   Right Ear: External ear normal.   Left Ear: External ear normal.   Nose: Nose normal.   Mouth/Throat: Oropharynx is clear and moist. No oropharyngeal exudate.   Eyes: Conjunctivae and EOM are normal. Pupils are equal, round, and reactive to light. Right eye exhibits no discharge. Left eye exhibits no discharge. No scleral icterus.   Neck: Normal range of motion. Neck supple. No JVD present. No tracheal deviation present. No thyromegaly present.   Cardiovascular: Normal rate, regular rhythm, normal heart sounds and intact distal  pulses. Exam reveals no gallop and no friction rub.   No murmur heard.  Pulmonary/Chest: Effort normal and breath sounds normal. No stridor. No respiratory distress. She has no wheezes. She has no rales. She exhibits no tenderness.   Abdominal: Soft. Bowel sounds are normal. She exhibits no distension and no mass. There is no tenderness. There is no rebound and no guarding. No hernia.   Musculoskeletal: Normal range of motion. She exhibits no edema, tenderness or deformity.   Lymphadenopathy:     She has no cervical adenopathy.   Neurological: She is alert and oriented to person, place, and time. She has normal reflexes. She displays normal reflexes. No cranial nerve deficit or sensory deficit. She exhibits normal muscle tone. Coordination normal.   Skin: Skin is warm and dry. No rash noted. She is not diaphoretic. No erythema. No pallor.   Psychiatric: She has a normal mood and affect. Her behavior is normal. Judgment and thought content normal.   Nursing note and vitals reviewed.       Results for orders placed or performed in visit on 03/26/19   T3, Free   Result Value Ref Range    T3, Free 2.7 2.0 - 4.4 pg/mL   T4 & TSH ([facility])   Result Value Ref Range    TSH 0.711 0.270 - 4.200 mIU/mL    T4, Total 8.19 4.50 - 11.70 mcg/dL   T4, Free   Result Value Ref Range    Free T4 1.50 0.93 - 1.70 ng/dL   Uric Acid   Result Value Ref Range    Uric Acid 5.9 (H) 2.4 - 5.7 mg/dL   Vitamin D 25 Hydroxy   Result Value Ref Range    25 Hydroxy, Vitamin D 48.1 30.0 - 100.0 ng/ml   Comprehensive Metabolic Panel   Result Value Ref Range    Glucose 290 (H) 65 - 99 mg/dL    BUN 14 8 - 23 mg/dL    Creatinine 1.56 (H) 0.57 - 1.00 mg/dL    eGFR Non African Am 34 (L) >60 mL/min/1.73    eGFR  Am 41 (L) >60 mL/min/1.73    BUN/Creatinine Ratio 9.0 7.0 - 25.0    Sodium 141 136 - 145 mmol/L    Potassium 3.5 3.5 - 5.2 mmol/L    Chloride 97 (L) 98 - 107 mmol/L    Total CO2 30.7 (H) 22.0 - 29.0 mmol/L    Calcium 9.0 8.6 - 10.5 mg/dL     Total Protein 6.2 6.0 - 8.5 g/dL    Albumin 4.00 3.50 - 5.20 g/dL    Globulin 2.2 gm/dL    A/G Ratio 1.8 g/dL    Total Bilirubin 0.3 0.2 - 1.2 mg/dL    Alkaline Phosphatase 79 39 - 117 U/L    AST (SGOT) 21 1 - 32 U/L    ALT (SGPT) 14 1 - 33 U/L   Hemoglobin A1c   Result Value Ref Range    Hemoglobin A1C 8.19 (H) 4.80 - 5.60 %   Lipid Panel   Result Value Ref Range    Total Cholesterol 129 0 - 200 mg/dL    Triglycerides 86 0 - 150 mg/dL    HDL Cholesterol 60 40 - 60 mg/dL    VLDL Cholesterol 17.2 5 - 40 mg/dL    LDL Cholesterol  52 0 - 100 mg/dL   MicroAlbumin, Urine, Random - Urine, Clean Catch   Result Value Ref Range    Microalbumin, Urine 5.6 Not Estab. ug/mL   Cardiovascular Risk Assessment   Result Value Ref Range    Interpretation Note    Diabetes Patient Education   Result Value Ref Range    PDF Image Not applicable          Assessment/Plan   Diagnoses and all orders for this visit:    Uncontrolled type 1 diabetes mellitus with hyperglycemia (CMS/[facility])  -     T3, Free; Future  -     T4 & TSH ([facility]); Future  -     T4, Free; Future  -     Uric Acid; Future  -     Vitamin D 25 Hydroxy; Future  -     Comprehensive Metabolic Panel; Future  -     Hemoglobin A1c; Future  -     Lipid Panel; Future  -     MicroAlbumin, Urine, Random - Urine, Clean Catch; Future    Essential hypertension  -     T3, Free; Future  -     T4 & TSH ([facility]); Future  -     T4, Free; Future  -     Uric Acid; Future  -     Vitamin D 25 Hydroxy; Future  -     Comprehensive Metabolic Panel; Future  -     Hemoglobin A1c; Future  -     Lipid Panel; Future  -     MicroAlbumin, Urine, Random - Urine, Clean Catch; Future    Vitamin D deficiency  -     T3, Free; Future  -     T4 & TSH ([facility]); Future  -     T4, Free; Future  -     Uric Acid; Future  -     Vitamin D 25 Hydroxy; Future  -     Comprehensive Metabolic Panel; Future  -     Hemoglobin A1c; Future  -     Lipid Panel; Future  -     MicroAlbumin, Urine, Random - Urine, Clean Catch;  Future    Primary hypothyroidism  -     T3, Free; Future  -     T4 & TSH ([facility]); Future  -     T4, Free; Future  -     Uric Acid; Future  -     Vitamin D 25 Hydroxy; Future  -     Comprehensive Metabolic Panel; Future  -     Hemoglobin A1c; Future  -     Lipid Panel; Future  -     MicroAlbumin, Urine, Random - Urine, Clean Catch; Future    Osteoporosis, unspecified osteoporosis type, unspecified pathological fracture presence  -     T3, Free; Future  -     T4 & TSH ([facility]); Future  -     T4, Free; Future  -     Uric Acid; Future  -     Vitamin D 25 Hydroxy; Future  -     Comprehensive Metabolic Panel; Future  -     Hemoglobin A1c; Future  -     Lipid Panel; Future  -     MicroAlbumin, Urine, Random - Urine, Clean Catch; Future    Menopause  -     T3, Free; Future  -     T4 & TSH ([facility]); Future  -     T4, Free; Future  -     Uric Acid; Future  -     Vitamin D 25 Hydroxy; Future  -     Comprehensive Metabolic Panel; Future  -     Hemoglobin A1c; Future  -     Lipid Panel; Future  -     MicroAlbumin, Urine, Random - Urine, Clean Catch; Future    Dyslipidemia  -     T3, Free; Future  -     T4 & TSH ([facility]); Future  -     T4, Free; Future  -     Uric Acid; Future  -     Vitamin D 25 Hydroxy; Future  -     Comprehensive Metabolic Panel; Future  -     Hemoglobin A1c; Future  -     Lipid Panel; Future  -     MicroAlbumin, Urine, Random - Urine, Clean Catch; Future    Chronic fatigue  -     T3, Free; Future  -     T4 & TSH ([facility]); Future  -     T4, Free; Future  -     Uric Acid; Future  -     Vitamin D 25 Hydroxy; Future  -     Comprehensive Metabolic Panel; Future  -     Hemoglobin A1c; Future  -     Lipid Panel; Future  -     MicroAlbumin, Urine, Random - Urine, Clean Catch; Future    Insulin pump titration  -     T3, Free; Future  -     T4 & TSH ([facility]); Future  -     T4, Free; Future  -     Uric Acid; Future  -     Vitamin D 25 Hydroxy; Future  -     Comprehensive Metabolic Panel; Future  -     Hemoglobin  A1c; Future  -     Lipid Panel; Future  -     MicroAlbumin, Urine, Random - Urine, Clean Catch; Future        In summary I saw and examined this 63-year-old female for above-mentioned.  I reviewed her laboratory evaluation of March 26, 2019 and provided her a hard copy of it.  Aside from her hemoglobin A1c of 8.19 which is high she is otherwise clinically and metabolically stable.  We discussed the pattern of her glycemia and the need for correcting it and after this office visit she will immediately see Ms. Chapman from [employer].  She will see Ms. Pamela Najera in 4 months or sooner if needed with laboratory evaluation prior to each office visit.

## 2019-05-09 RX ORDER — ROSUVASTATIN CALCIUM 10 MG/1
10 TABLET, COATED ORAL NIGHTLY
Qty: 90 TABLET | Refills: 0 | Status: SHIPPED | OUTPATIENT
Start: 2019-05-09 | End: 2019-08-05 | Stop reason: SDUPTHER

## 2019-05-09 RX ORDER — NEBIVOLOL HYDROCHLORIDE 10 MG/1
10 TABLET ORAL DAILY
Qty: 90 TABLET | Refills: 0 | Status: SHIPPED | OUTPATIENT
Start: 2019-05-09 | End: 2019-08-04 | Stop reason: SDUPTHER

## 2019-05-17 RX ORDER — LEVOTHYROXINE SODIUM 75 MCG
75 TABLET ORAL DAILY
Qty: 90 TABLET | Refills: 0 | Status: SHIPPED | OUTPATIENT
Start: 2019-05-17 | End: 2019-11-14 | Stop reason: SDUPTHER

## 2019-06-14 ENCOUNTER — OFFICE VISIT (OUTPATIENT)
Dept: OBSTETRICS AND GYNECOLOGY | Facility: CLINIC | Age: 64
End: 2019-06-14

## 2019-06-14 VITALS
DIASTOLIC BLOOD PRESSURE: 57 MMHG | WEIGHT: 164 LBS | HEIGHT: 61 IN | SYSTOLIC BLOOD PRESSURE: 121 MMHG | HEART RATE: 65 BPM | BODY MASS INDEX: 30.96 KG/M2

## 2019-06-14 DIAGNOSIS — Z01.419 ENCOUNTER FOR GYNECOLOGICAL EXAMINATION WITHOUT ABNORMAL FINDING: Primary | ICD-10-CM

## 2019-06-14 PROCEDURE — 99396 PREV VISIT EST AGE 40-64: CPT | Performed by: OBSTETRICS & GYNECOLOGY

## 2019-06-14 NOTE — PROGRESS NOTES
GYN Annual Exam     CC- Here for annual exam.     Dayanna Lewis is a 63 y.o. female who presents for annual well woman exam. Periods are absent due to Menopause.   OB History      Para Term  AB Living    2 2       2    SAB TAB Ectopic Molar Multiple Live Births                         Current contraception: post menopausal status  History of abnormal Pap smear: no  Family history of uterine, colon or ovarian cancer: no  History of abnormal mammogram: no  Family history of breast cancer: no  Last Pap : 2017 NL HPV neg  Last mammogram: 2018  Last colonoscopy: 10 yrs ago- recently did Cologard-awaiting results.  Last DEXA: few yrs ago    Past Medical History:   Diagnosis Date   • Depression    • Dermatomyositis (CMS/HCC)     TYPE 2   • Diabetes mellitus type 1, uncontrolled (CMS/HCC)    • Disease of thyroid gland    • Dyslipidemia    • Hyperlipidemia    • Hypertension    • Menopausal disorder    • Osteoporosis    • Polymyositis (CMS/HCC)    • Vitamin D deficiency        Past Surgical History:   Procedure Laterality Date   •  SECTION     • INNER EAR SURGERY     • KNEE SURGERY     • OTHER SURGICAL HISTORY      EAR SURGERY         Current Outpatient Medications:   •  acetone, urine, test strip, Use as directed, Disp: 50 each, Rfl: 1  •  APIDRA 100 UNIT/ML injection, Used in an insulin pump up to 100 units daily, Disp: 90 mL, Rfl: 3  •  aspirin 81 MG tablet, Take 1 tablet by mouth daily., Disp: , Rfl:   •  buPROPion XL (WELLBUTRIN XL) 300 MG 24 hr tablet, Take 1 tablet by mouth Every Morning., Disp: 90 tablet, Rfl: 3  •  BYSTOLIC 10 MG tablet, TAKE 1 TABLET BY MOUTH DAILY, Disp: 90 tablet, Rfl: 0  •  folic acid (FOLVITE) 1 MG tablet, TAKE 1 TABLET BY MOUTH DAILY, Disp: 90 tablet, Rfl: 0  •  glucagon (GLUCAGON EMERGENCY) 1 MG injection, Use as directed, Disp: 2 kit, Rfl: 5  •  glucose blood test strip, Mari Contour Next Test In Vitro Strip; Patient Sig: Mari Contour Next Test In Vitro  "Strip test 8 times daily; 240; 5; 10-Nov-2014; Active, Disp: , Rfl:   •  LANTUS 100 UNIT/ML injection, 21 units subcutaneous daily, Disp: 10 mL, Rfl: 5  •  Multiple Minerals-Vitamins (CITRACAL PLUS BONE DENSITY) tablet, Take by mouth., Disp: , Rfl:   •  raloxifene (EVISTA) 60 MG tablet, Take 1 tablet by mouth Daily., Disp: 90 tablet, Rfl: 3  •  raNITIdine (ZANTAC) 150 MG tablet, Take 150 mg by mouth As Needed for Heartburn., Disp: , Rfl:   •  rosuvastatin (CRESTOR) 10 MG tablet, TAKE 1 TABLET BY MOUTH EVERY NIGHT, Disp: 90 tablet, Rfl: 0  •  SYNTHROID 75 MCG tablet, TAKE 1 TABLET BY MOUTH DAILY, Disp: 90 tablet, Rfl: 0  •  vitamin D (ERGOCALCIFEROL) 87688 units capsule capsule, Take 1 capsule by mouth Every 7 (Seven) Days., Disp: 13 capsule, Rfl: 3  •  vitamin E 400 UNIT capsule, Take 1 capsule by mouth daily., Disp: , Rfl:     No Known Allergies    Social History     Tobacco Use   • Smoking status: Never Smoker   • Smokeless tobacco: Never Used   Substance Use Topics   • Alcohol use: No   • Drug use: No       Family History   Problem Relation Age of Onset   • Depression Mother    • Glaucoma Mother    • Deep vein thrombosis Mother    • Diabetes Father    • Glaucoma Father    • Hypertension Father    • Diabetes Sister    • Thyroid disease Sister    • Diabetes Brother    • Breast cancer Neg Hx    • Ovarian cancer Neg Hx    • Uterine cancer Neg Hx    • Colon cancer Neg Hx    • Pulmonary embolism Neg Hx        Review of Systems   Constitutional: Negative for chills and fever.   Gastrointestinal: Negative for abdominal pain.   Genitourinary: Negative for dysuria, pelvic pain, vaginal bleeding and vaginal discharge.   All other systems reviewed and are negative.      /57   Pulse 65   Ht 154.9 cm (61\")   Wt 74.4 kg (164 lb)   LMP  (LMP Unknown)   Breastfeeding? No   BMI 30.99 kg/m²     Physical Exam   Constitutional: She is oriented to person, place, and time. She appears well-developed and well-nourished. No " distress. She is not obese.  HENT:   Head: Normocephalic and atraumatic.   Eyes: Conjunctivae are normal. Right eye exhibits no discharge. Left eye exhibits no discharge.   Neck: Normal range of motion. Neck supple. No thyromegaly present.   Cardiovascular: Normal rate, regular rhythm and normal heart sounds.   No murmur heard.  Pulmonary/Chest: Effort normal and breath sounds normal. No respiratory distress. Right breast exhibits no inverted nipple, no mass and no nipple discharge. Left breast exhibits no inverted nipple, no mass and no nipple discharge.   Abdominal: Soft. Bowel sounds are normal. She exhibits no distension. There is no tenderness.   Genitourinary: Rectum normal and cervix normal. Rectal exam shows no mass and anal tone normal. Pelvic exam was performed with patient supine. There is no lesion or Bartholin's cyst on the right labia. There is no lesion or Bartholin's cyst on the left labia. Uterus is anteverted. Uterus is not deviated, enlarged, fixed or exhibiting a mass.   Cervix is not parous. Cervix does not exhibit motion tenderness. Right adnexum is non-palpable.Left adnexum is non-palpable.Vagina exhibits loss of rugae. No bleeding in the vagina. No vaginal discharge found.   Musculoskeletal: Normal range of motion. She exhibits no edema.   Lymphadenopathy:     She has no cervical adenopathy.        Right: No inguinal adenopathy present.        Left: No inguinal adenopathy present.   Neurological: She is alert and oriented to person, place, and time.   Skin: Skin is warm and dry. No rash noted.   Psychiatric: She has a normal mood and affect. Her behavior is normal. Judgment and thought content normal.          Assessment     1) GYN annual well woman exam.        Plan     1) Breast Health - Clinical breast exam & mammogram yearly, Self breast awareness monthly  2) Pap - updated today   3) Smoking status- non-smoker   4) Colon health - screening colonoscopy recommended if not up to date  5)  Bone health - Weight bearing exercise, dietary calcium recommendations and vitamin D reviewed.   With DEXA next visit.   6) Activity recommends - Adult 150-300 min/week of multi-component physical activities that include balance training, aerobic and physical strengthening.  Disabled or ill adults should still try to fulfill these requirements, with modifications based on their conditions.   7) Follow up prn and two years      Inocente Arredondo MD   6/14/2019  10:59 AM

## 2019-06-18 ENCOUNTER — PRIOR AUTHORIZATION (OUTPATIENT)
Dept: ENDOCRINOLOGY | Age: 64
End: 2019-06-18

## 2019-06-18 LAB
CONV .: NORMAL
CYTOLOGIST CVX/VAG CYTO: NORMAL
CYTOLOGY CVX/VAG DOC CYTO: NORMAL
CYTOLOGY CVX/VAG DOC THIN PREP: NORMAL
DX ICD CODE: NORMAL
HIV 1 & 2 AB SER-IMP: NORMAL
OTHER STN SPEC: NORMAL
STAT OF ADQ CVX/VAG CYTO-IMP: NORMAL

## 2019-06-26 DIAGNOSIS — E10.65 UNCONTROLLED TYPE 1 DIABETES MELLITUS WITH HYPERGLYCEMIA (HCC): Primary | ICD-10-CM

## 2019-06-26 DIAGNOSIS — E55.9 VITAMIN D DEFICIENCY: ICD-10-CM

## 2019-06-26 DIAGNOSIS — E03.9 PRIMARY HYPOTHYROIDISM: ICD-10-CM

## 2019-06-26 DIAGNOSIS — E78.5 DYSLIPIDEMIA: ICD-10-CM

## 2019-07-01 ENCOUNTER — LAB (OUTPATIENT)
Dept: ENDOCRINOLOGY | Age: 64
End: 2019-07-01

## 2019-07-01 DIAGNOSIS — E55.9 VITAMIN D DEFICIENCY: ICD-10-CM

## 2019-07-01 DIAGNOSIS — E78.5 DYSLIPIDEMIA: ICD-10-CM

## 2019-07-01 DIAGNOSIS — E03.9 PRIMARY HYPOTHYROIDISM: ICD-10-CM

## 2019-07-01 DIAGNOSIS — E10.65 UNCONTROLLED TYPE 1 DIABETES MELLITUS WITH HYPERGLYCEMIA (HCC): ICD-10-CM

## 2019-07-01 RX ORDER — FOLIC ACID 1 MG/1
TABLET ORAL
Qty: 90 TABLET | Refills: 0 | Status: SHIPPED | OUTPATIENT
Start: 2019-07-01 | End: 2019-07-10 | Stop reason: SDUPTHER

## 2019-07-05 LAB
25(OH)D3+25(OH)D2 SERPL-MCNC: 47.5 NG/ML (ref 30–100)
ALBUMIN SERPL-MCNC: 4.2 G/DL (ref 3.5–5.2)
ALBUMIN/GLOB SERPL: 2.3 G/DL
ALP SERPL-CCNC: 81 U/L (ref 39–117)
ALT SERPL-CCNC: 13 U/L (ref 1–33)
AST SERPL-CCNC: 17 U/L (ref 1–32)
BILIRUB SERPL-MCNC: 0.4 MG/DL (ref 0.2–1.2)
BUN SERPL-MCNC: 17 MG/DL (ref 8–23)
BUN/CREAT SERPL: 13.2 (ref 7–25)
C PEPTIDE SERPL-MCNC: <0.1 NG/ML (ref 1.1–4.4)
CALCIUM SERPL-MCNC: 8.9 MG/DL (ref 8.6–10.5)
CHLORIDE SERPL-SCNC: 107 MMOL/L (ref 98–107)
CHOLEST SERPL-MCNC: 142 MG/DL (ref 0–200)
CO2 SERPL-SCNC: 21.9 MMOL/L (ref 22–29)
CREAT SERPL-MCNC: 1.29 MG/DL (ref 0.57–1)
FT4I SERPL CALC-MCNC: 2.2 (ref 1.2–4.9)
GLOBULIN SER CALC-MCNC: 1.8 GM/DL
GLUCOSE SERPL-MCNC: 224 MG/DL (ref 65–99)
HBA1C MFR BLD: 7.3 % (ref 4.8–5.6)
HDLC SERPL-MCNC: 67 MG/DL (ref 40–60)
INTERPRETATION: NORMAL
LDLC SERPL CALC-MCNC: 60 MG/DL (ref 0–100)
Lab: NORMAL
POTASSIUM SERPL-SCNC: 4.5 MMOL/L (ref 3.5–5.2)
PROT SERPL-MCNC: 6 G/DL (ref 6–8.5)
SODIUM SERPL-SCNC: 142 MMOL/L (ref 136–145)
T3FREE SERPL-MCNC: 2.2 PG/ML (ref 2–4.4)
T3RU NFR SERPL: 30 % (ref 24–39)
T4 FREE SERPL-MCNC: 1.37 NG/DL (ref 0.93–1.7)
T4 SERPL-MCNC: 7.3 UG/DL (ref 4.5–12)
THYROGLOB AB SERPL-ACNC: <1 IU/ML
THYROGLOB SERPL-MCNC: 8 NG/ML
THYROGLOB SERPL-MCNC: NORMAL NG/ML
TRIGL SERPL-MCNC: 77 MG/DL (ref 0–150)
TSH SERPL DL<=0.005 MIU/L-ACNC: 1.38 UIU/ML (ref 0.45–4.5)
VLDLC SERPL CALC-MCNC: 15.4 MG/DL

## 2019-07-10 ENCOUNTER — OFFICE VISIT (OUTPATIENT)
Dept: ENDOCRINOLOGY | Age: 64
End: 2019-07-10

## 2019-07-10 VITALS
BODY MASS INDEX: 31.53 KG/M2 | SYSTOLIC BLOOD PRESSURE: 130 MMHG | WEIGHT: 167 LBS | HEIGHT: 61 IN | DIASTOLIC BLOOD PRESSURE: 70 MMHG

## 2019-07-10 DIAGNOSIS — E10.65 UNCONTROLLED TYPE 1 DIABETES MELLITUS WITH HYPERGLYCEMIA (HCC): Primary | ICD-10-CM

## 2019-07-10 DIAGNOSIS — E55.9 VITAMIN D DEFICIENCY: ICD-10-CM

## 2019-07-10 DIAGNOSIS — I10 ESSENTIAL HYPERTENSION: ICD-10-CM

## 2019-07-10 DIAGNOSIS — Z46.81 INSULIN PUMP TITRATION: ICD-10-CM

## 2019-07-10 DIAGNOSIS — E03.9 PRIMARY HYPOTHYROIDISM: ICD-10-CM

## 2019-07-10 PROCEDURE — 99214 OFFICE O/P EST MOD 30 MIN: CPT | Performed by: NURSE PRACTITIONER

## 2019-07-10 NOTE — PATIENT INSTRUCTIONS
Compression socks for edema   Continue all current medications   Follow-up with [facility]

## 2019-07-10 NOTE — PROGRESS NOTES
"Subjective   Dayanna Lewis is a 63 y.o. female is here today for follow-up.  Chief Complaint   Patient presents with   • Diabetes     recent labs, pump download, testing BG 4 times daily   • Hyperlipidemia   • Hypertension   • Hypothyroidism   • Vitamin D Deficiency     /70   Ht 154.9 cm (61\")   Wt 75.8 kg (167 lb)   LMP  (LMP Unknown)   BMI 31.55 kg/m²   Current Outpatient Medications on File Prior to Visit   Medication Sig   • acetone, urine, test strip Use as directed   • APIDRA 100 UNIT/ML injection Used in an insulin pump up to 100 units daily   • aspirin 81 MG tablet Take 1 tablet by mouth daily.   • buPROPion XL (WELLBUTRIN XL) 300 MG 24 hr tablet Take 1 tablet by mouth Every Morning.   • BYSTOLIC 10 MG tablet TAKE 1 TABLET BY MOUTH DAILY   • folic acid (FOLVITE) 1 MG tablet TAKE 1 TABLET BY MOUTH DAILY   • glucagon (GLUCAGON EMERGENCY) 1 MG injection Use as directed   • glucose blood test strip Mari Contour Next Test In Vitro Strip; Patient Sig: Mari Contour Next Test In Vitro Strip test 8 times daily; 240; 5; 10-Nov-2014; Active   • LANTUS 100 UNIT/ML injection 21 units subcutaneous daily   • Multiple Minerals-Vitamins (CITRACAL PLUS BONE DENSITY) tablet Take by mouth.   • raloxifene (EVISTA) 60 MG tablet Take 1 tablet by mouth Daily.   • raNITIdine (ZANTAC) 150 MG tablet Take 150 mg by mouth As Needed for Heartburn.   • rosuvastatin (CRESTOR) 10 MG tablet TAKE 1 TABLET BY MOUTH EVERY NIGHT   • SYNTHROID 75 MCG tablet TAKE 1 TABLET BY MOUTH DAILY   • vitamin D (ERGOCALCIFEROL) 62360 units capsule capsule Take 1 capsule by mouth Every 7 (Seven) Days.   • vitamin E 400 UNIT capsule Take 1 capsule by mouth daily.   • [DISCONTINUED] folic acid (FOLVITE) 1 MG tablet TAKE 1 TABLET BY MOUTH DAILY     No current facility-administered medications on file prior to visit.      Family History   Problem Relation Age of Onset   • Depression Mother    • Glaucoma Mother    • Deep vein thrombosis Mother    • " Diabetes Father    • Glaucoma Father    • Hypertension Father    • Diabetes Sister    • Thyroid disease Sister    • Diabetes Brother    • Breast cancer Neg Hx    • Ovarian cancer Neg Hx    • Uterine cancer Neg Hx    • Colon cancer Neg Hx    • Pulmonary embolism Neg Hx      Social History     Tobacco Use   • Smoking status: Never Smoker   • Smokeless tobacco: Never Used   Substance Use Topics   • Alcohol use: No   • Drug use: No     No Known Allergies      History of Present Illness  Encounter Diagnoses   Name Primary?   • Uncontrolled type 1 diabetes mellitus with hyperglycemia (CMS/HCC) Yes   • Essential hypertension    • Insulin pump titration    • Primary hypothyroidism    • Vitamin D deficiency      63-year-old female being seen for the above-mentioned problems.  Recent labs were reviewed and patient was provided a copy. Medication list was reviewed and updated.  Denies any thyroid symptoms. She was recently taken off her water pill in the last month.  She is  experiencing some weight gain and edema in her legs and feet.  Medtronic pump download was reviewed.   She is experiencing frequent lows, some in the 40s.  Was approved for a new pump 670 G last Wednesday.  She has not been approved for the sensor.  She will follow-up with [employer] today to review her pump settings as well as her low blood sugars.    The following portions of the patient's history were reviewed and updated as appropriate: allergies, current medications, past family history, past medical history, past social history, past surgical history and problem list.    Review of Systems   Constitutional: Negative for fatigue.   HENT: Negative for trouble swallowing.    Eyes: Negative for visual disturbance.   Cardiovascular: Negative for leg swelling.   Endocrine: Negative for polyuria.   Skin: Negative for wound.   Neurological: Negative for numbness.       Objective   Physical Exam   Constitutional: She is oriented to person, place, and time. She  appears well-developed and well-nourished. No distress.   HENT:   Head: Normocephalic and atraumatic.   Right Ear: External ear normal.   Left Ear: External ear normal.   Nose: Nose normal.   Mouth/Throat: Oropharynx is clear and moist. No oropharyngeal exudate.   Eyes: Conjunctivae and EOM are normal. Pupils are equal, round, and reactive to light. Right eye exhibits no discharge. Left eye exhibits no discharge. No scleral icterus.   Neck: Trachea normal, normal range of motion and full passive range of motion without pain. Neck supple. No JVD present. No tracheal tenderness present. Carotid bruit is not present. No tracheal deviation, no edema and no erythema present. No thyroid mass and no thyromegaly present.   Cardiovascular: Normal rate, regular rhythm, normal heart sounds and intact distal pulses. Exam reveals no gallop and no friction rub.   No murmur heard.  Pulmonary/Chest: Effort normal and breath sounds normal. No stridor. No respiratory distress. She has no wheezes. She has no rales. She exhibits no tenderness.   Abdominal: Soft. Bowel sounds are normal. She exhibits no distension and no mass. There is no tenderness. There is no rebound and no guarding. No hernia.   Musculoskeletal: Normal range of motion. She exhibits no edema, tenderness or deformity.   Lymphadenopathy:     She has no cervical adenopathy.   Neurological: She is alert and oriented to person, place, and time. She has normal reflexes. She displays normal reflexes. No cranial nerve deficit. She exhibits normal muscle tone. Coordination normal.   Skin: Skin is warm and dry. No rash noted. She is not diaphoretic. No erythema. No pallor.   Edema to bilateral lower extremities    Psychiatric: She has a normal mood and affect. Her behavior is normal. Judgment and thought content normal.   Nursing note and vitals reviewed.    Results for orders placed or performed in visit on 07/01/19   Comprehensive Thyroglobulin   Result Value Ref Range     Thyroglobulin Ab <1.0 IU/mL    Thyroglobulin 8.0 ng/mL    Thyroglobulin (TG-MELQUIADES) Comment ng/mL   Thyroid Panel With TSH   Result Value Ref Range    TSH 1.380 0.450 - 4.500 uIU/mL    T4, Total 7.3 4.5 - 12.0 ug/dL    T3 Uptake 30 24 - 39 %    Free Thyroxine Index 2.2 1.2 - 4.9   T4, Free   Result Value Ref Range    Free T4 1.37 0.93 - 1.70 ng/dL   T3, Free   Result Value Ref Range    T3, Free 2.2 2.0 - 4.4 pg/mL   C-Peptide   Result Value Ref Range    C-Peptide <0.1 (L) 1.1 - 4.4 ng/mL   Hemoglobin A1c   Result Value Ref Range    Hemoglobin A1C 7.30 (H) 4.80 - 5.60 %   Vitamin D 25 Hydroxy   Result Value Ref Range    25 Hydroxy, Vitamin D 47.5 30.0 - 100.0 ng/ml   Lipid Panel   Result Value Ref Range    Total Cholesterol 142 0 - 200 mg/dL    Triglycerides 77 0 - 150 mg/dL    HDL Cholesterol 67 (H) 40 - 60 mg/dL    VLDL Cholesterol 15.4 mg/dL    LDL Cholesterol  60 0 - 100 mg/dL   Comprehensive Metabolic Panel   Result Value Ref Range    Glucose 224 (H) 65 - 99 mg/dL    BUN 17 8 - 23 mg/dL    Creatinine 1.29 (H) 0.57 - 1.00 mg/dL    eGFR Non African Am 42 (L) >60 mL/min/1.73    eGFR African Am 51 (L) >60 mL/min/1.73    BUN/Creatinine Ratio 13.2 7.0 - 25.0    Sodium 142 136 - 145 mmol/L    Potassium 4.5 3.5 - 5.2 mmol/L    Chloride 107 98 - 107 mmol/L    Total CO2 21.9 (L) 22.0 - 29.0 mmol/L    Calcium 8.9 8.6 - 10.5 mg/dL    Total Protein 6.0 6.0 - 8.5 g/dL    Albumin 4.20 3.50 - 5.20 g/dL    Globulin 1.8 gm/dL    A/G Ratio 2.3 g/dL    Total Bilirubin 0.4 0.2 - 1.2 mg/dL    Alkaline Phosphatase 81 39 - 117 U/L    AST (SGOT) 17 1 - 32 U/L    ALT (SGPT) 13 1 - 33 U/L   Cardiovascular Risk Assessment   Result Value Ref Range    Interpretation Note    Diabetes Patient Education   Result Value Ref Range    PDF Image Not applicable          Assessment/Plan   Problems Addressed this Visit        Cardiovascular and Mediastinum    HTN (hypertension)       Digestive    Vitamin D deficiency       Endocrine    Diabetes mellitus  type 1, uncontrolled (CMS/HCC) - Primary    Primary hypothyroidism       Other    Insulin pump titration        In summary patient was seen and examined.  She appears clinically stable.  Most recent A1c was 7.30 which has decreased from 8.19.  Vitamin D is in satisfactory range.  Thyroid is in satisfactory range. Blood pressure is in satisfactory range. Lipid panel is in satisfactory range.  She is encouraged to wear compression socks for bilateral edema.  Med list was reviewed to determine side effects edema. She is only taking Lantus when her pump is not working which is rare.  She will continue all current medications. She will follow-up with [employer] today to discuss a sensor.  Follow-up here in 3 months with labs prior.  She is encouraged to call the office with any questions or concerns.    Compression socks for edema   Continue all current medications   Follow-up with [facility]

## 2019-07-29 ENCOUNTER — RESULTS ENCOUNTER (OUTPATIENT)
Dept: ENDOCRINOLOGY | Age: 64
End: 2019-07-29

## 2019-07-29 DIAGNOSIS — E78.5 DYSLIPIDEMIA: ICD-10-CM

## 2019-07-29 DIAGNOSIS — Z46.81 INSULIN PUMP TITRATION: ICD-10-CM

## 2019-07-29 DIAGNOSIS — Z78.0 MENOPAUSE: ICD-10-CM

## 2019-07-29 DIAGNOSIS — E10.65 UNCONTROLLED TYPE 1 DIABETES MELLITUS WITH HYPERGLYCEMIA (HCC): ICD-10-CM

## 2019-07-29 DIAGNOSIS — I10 ESSENTIAL HYPERTENSION: ICD-10-CM

## 2019-07-29 DIAGNOSIS — R53.82 CHRONIC FATIGUE: ICD-10-CM

## 2019-07-29 DIAGNOSIS — E55.9 VITAMIN D DEFICIENCY: ICD-10-CM

## 2019-07-29 DIAGNOSIS — E03.9 PRIMARY HYPOTHYROIDISM: ICD-10-CM

## 2019-07-29 DIAGNOSIS — M81.0 OSTEOPOROSIS, UNSPECIFIED OSTEOPOROSIS TYPE, UNSPECIFIED PATHOLOGICAL FRACTURE PRESENCE: ICD-10-CM

## 2019-08-04 RX ORDER — NEBIVOLOL HYDROCHLORIDE 10 MG/1
10 TABLET ORAL DAILY
Qty: 90 TABLET | Refills: 0 | Status: SHIPPED | OUTPATIENT
Start: 2019-08-04 | End: 2019-10-31 | Stop reason: SDUPTHER

## 2019-08-05 RX ORDER — ROSUVASTATIN CALCIUM 10 MG/1
10 TABLET, COATED ORAL NIGHTLY
Qty: 90 TABLET | Refills: 0 | Status: SHIPPED | OUTPATIENT
Start: 2019-08-05 | End: 2019-10-31 | Stop reason: SDUPTHER

## 2019-08-26 ENCOUNTER — APPOINTMENT (OUTPATIENT)
Dept: WOMENS IMAGING | Facility: HOSPITAL | Age: 64
End: 2019-08-26

## 2019-08-26 PROCEDURE — 77063 BREAST TOMOSYNTHESIS BI: CPT | Performed by: RADIOLOGY

## 2019-08-26 PROCEDURE — 77067 SCR MAMMO BI INCL CAD: CPT | Performed by: RADIOLOGY

## 2019-09-21 LAB
25(OH)D3+25(OH)D2 SERPL-MCNC: 51.1 NG/ML (ref 30–100)
ALBUMIN SERPL-MCNC: 4 G/DL (ref 3.5–5.2)
ALBUMIN/GLOB SERPL: 2 G/DL
ALP SERPL-CCNC: 82 U/L (ref 39–117)
ALT SERPL-CCNC: 13 U/L (ref 1–33)
AST SERPL-CCNC: 18 U/L (ref 1–32)
BILIRUB SERPL-MCNC: 0.4 MG/DL (ref 0.2–1.2)
BUN SERPL-MCNC: 14 MG/DL (ref 8–23)
BUN/CREAT SERPL: 10.7 (ref 7–25)
CALCIUM SERPL-MCNC: 9 MG/DL (ref 8.6–10.5)
CHLORIDE SERPL-SCNC: 106 MMOL/L (ref 98–107)
CHOLEST SERPL-MCNC: 132 MG/DL (ref 0–200)
CO2 SERPL-SCNC: 26.2 MMOL/L (ref 22–29)
CREAT SERPL-MCNC: 1.31 MG/DL (ref 0.57–1)
GLOBULIN SER CALC-MCNC: 2 GM/DL
GLUCOSE SERPL-MCNC: 150 MG/DL (ref 65–99)
HBA1C MFR BLD: 7.6 % (ref 4.8–5.6)
HDLC SERPL-MCNC: 58 MG/DL (ref 40–60)
INTERPRETATION: NORMAL
LDLC SERPL CALC-MCNC: 60 MG/DL (ref 0–100)
Lab: NORMAL
MICROALBUMIN UR-MCNC: 10 UG/ML
POTASSIUM SERPL-SCNC: 4.7 MMOL/L (ref 3.5–5.2)
PROT SERPL-MCNC: 6 G/DL (ref 6–8.5)
SODIUM SERPL-SCNC: 143 MMOL/L (ref 136–145)
T3FREE SERPL-MCNC: 2.4 PG/ML (ref 2–4.4)
T4 FREE SERPL-MCNC: 1.43 NG/DL (ref 0.93–1.7)
T4 SERPL-MCNC: 7.37 MCG/DL (ref 4.5–11.7)
TRIGL SERPL-MCNC: 71 MG/DL (ref 0–150)
TSH SERPL DL<=0.005 MIU/L-ACNC: 1.03 UIU/ML (ref 0.27–4.2)
URATE SERPL-MCNC: 4.2 MG/DL (ref 2.4–5.7)
VLDLC SERPL CALC-MCNC: 14.2 MG/DL

## 2019-09-30 RX ORDER — FOLIC ACID 1 MG/1
TABLET ORAL
Qty: 90 TABLET | Refills: 0 | Status: SHIPPED | OUTPATIENT
Start: 2019-09-30 | End: 2019-10-03 | Stop reason: SDUPTHER

## 2019-10-03 ENCOUNTER — OFFICE VISIT (OUTPATIENT)
Dept: ENDOCRINOLOGY | Age: 64
End: 2019-10-03

## 2019-10-03 VITALS
HEIGHT: 61 IN | BODY MASS INDEX: 31.34 KG/M2 | SYSTOLIC BLOOD PRESSURE: 126 MMHG | WEIGHT: 166 LBS | DIASTOLIC BLOOD PRESSURE: 68 MMHG

## 2019-10-03 DIAGNOSIS — E03.9 PRIMARY HYPOTHYROIDISM: ICD-10-CM

## 2019-10-03 DIAGNOSIS — E55.9 VITAMIN D DEFICIENCY: ICD-10-CM

## 2019-10-03 DIAGNOSIS — E78.5 DYSLIPIDEMIA: ICD-10-CM

## 2019-10-03 DIAGNOSIS — Z46.81 INSULIN PUMP TITRATION: ICD-10-CM

## 2019-10-03 DIAGNOSIS — I10 ESSENTIAL HYPERTENSION: ICD-10-CM

## 2019-10-03 DIAGNOSIS — E10.65 UNCONTROLLED TYPE 1 DIABETES MELLITUS WITH HYPERGLYCEMIA (HCC): Primary | ICD-10-CM

## 2019-10-03 PROCEDURE — 95251 CONT GLUC MNTR ANALYSIS I&R: CPT | Performed by: NURSE PRACTITIONER

## 2019-10-03 PROCEDURE — 99214 OFFICE O/P EST MOD 30 MIN: CPT | Performed by: NURSE PRACTITIONER

## 2019-10-03 NOTE — PROGRESS NOTES
"Subjective   Dayanna Lewis is a 63 y.o. female is here today for follow-up.  Chief Complaint   Patient presents with   • Diabetes     recent labs, pump download, testing BG 3 times daily   • Hypothyroidism   • Hypertension   • Vitamin D Deficiency     /68   Ht 154.9 cm (61\")   Wt 75.3 kg (166 lb)   LMP  (LMP Unknown)   BMI 31.37 kg/m²   Current Outpatient Medications on File Prior to Visit   Medication Sig   • acetone, urine, test strip Use as directed   • APIDRA 100 UNIT/ML injection Used in an insulin pump up to 100 units daily   • aspirin 81 MG tablet Take 1 tablet by mouth daily.   • buPROPion XL (WELLBUTRIN XL) 300 MG 24 hr tablet Take 1 tablet by mouth Every Morning.   • BYSTOLIC 10 MG tablet TAKE 1 TABLET BY MOUTH DAILY   • folic acid (FOLVITE) 1 MG tablet TAKE 1 TABLET BY MOUTH DAILY   • glucagon (GLUCAGON EMERGENCY) 1 MG injection Use as directed   • glucose blood test strip Mari Contour Next Test In Vitro Strip; Patient Sig: Mari Contour Next Test In Vitro Strip test 8 times daily; 240; 5; 10-Nov-2014; Active   • LANTUS 100 UNIT/ML injection 21 units subcutaneous daily   • Multiple Minerals-Vitamins (CITRACAL PLUS BONE DENSITY) tablet Take by mouth.   • raloxifene (EVISTA) 60 MG tablet Take 1 tablet by mouth Daily.   • rosuvastatin (CRESTOR) 10 MG tablet TAKE 1 TABLET BY MOUTH EVERY NIGHT   • SYNTHROID 75 MCG tablet TAKE 1 TABLET BY MOUTH DAILY   • vitamin D (ERGOCALCIFEROL) 67182 units capsule capsule Take 1 capsule by mouth Every 7 (Seven) Days.   • vitamin E 400 UNIT capsule Take 1 capsule by mouth daily.   • [DISCONTINUED] folic acid (FOLVITE) 1 MG tablet TAKE 1 TABLET BY MOUTH DAILY   • [DISCONTINUED] raNITIdine (ZANTAC) 150 MG tablet Take 150 mg by mouth As Needed for Heartburn.     No current facility-administered medications on file prior to visit.      Family History   Problem Relation Age of Onset   • Depression Mother    • Glaucoma Mother    • Deep vein thrombosis Mother    • " Diabetes Father    • Glaucoma Father    • Hypertension Father    • Diabetes Sister    • Thyroid disease Sister    • Diabetes Brother    • Breast cancer Neg Hx    • Ovarian cancer Neg Hx    • Uterine cancer Neg Hx    • Colon cancer Neg Hx    • Pulmonary embolism Neg Hx      Social History     Tobacco Use   • Smoking status: Never Smoker   • Smokeless tobacco: Never Used   Substance Use Topics   • Alcohol use: No   • Drug use: No     No Known Allergies      History of Present Illness   Encounter Diagnoses   Name Primary?   • Uncontrolled type 1 diabetes mellitus with hyperglycemia (CMS/[facility]) Yes   • Dyslipidemia    • Essential hypertension    • Insulin pump titration    • Primary hypothyroidism    • Vitamin D deficiency      63-year-old female patient here today for a follow-up visit.  She is being seen for the above-mentioned problems.  She had recent labs which were reviewed and she was provided a copy.  She is on a Medtronic insulin pump as well as the guardian connect that is not paired with the pump.  She is applied for financial assistance to get the sensor that pairs with her 630 G insulin pump.  She is having considerable glycemic excursions.  She is having highs and lows.  Her A1c is slightly higher than it was at her last visit.  She feels like she would benefit from the sensor that is program to work with her insulin pump.  She is contacted [employer] and [employer] as well as [employer] several times regarding the patient assistance and has not heard back.  She is left several messages with [employer] and has been several months since she submitted the paperwork.  [employer] will be asked today to follow-up on the status of her patient assistance.  She does have occasional low blood sugars.  She has had no significant hypoglycemic events requiring emergent care.  The following portions of the patient's history were reviewed and updated as appropriate: allergies, current medications, past family history, past medical  history, past social history, past surgical history and problem list.    Review of Systems   Constitutional: Negative.    HENT: Negative.    Eyes: Negative.    Cardiovascular: Negative.    Endocrine: Negative.    Skin: Negative.    Neurological: Negative.        Objective   Physical Exam   Constitutional: She is oriented to person, place, and time. She appears well-developed and well-nourished. No distress.   HENT:   Head: Normocephalic and atraumatic.   Right Ear: External ear normal.   Left Ear: External ear normal.   Nose: Nose normal.   Mouth/Throat: Oropharynx is clear and moist. No oropharyngeal exudate.   Eyes: Conjunctivae and EOM are normal. Pupils are equal, round, and reactive to light. Right eye exhibits no discharge. Left eye exhibits no discharge. No scleral icterus.   Neck: Trachea normal, normal range of motion and full passive range of motion without pain. Neck supple. No JVD present. No tracheal tenderness present. Carotid bruit is not present. No tracheal deviation, no edema and no erythema present. No thyroid mass and no thyromegaly present.   Cardiovascular: Normal rate, regular rhythm, normal heart sounds and intact distal pulses. Exam reveals no gallop and no friction rub.   No murmur heard.  Pulmonary/Chest: Effort normal and breath sounds normal. No stridor. No respiratory distress. She has no wheezes. She has no rales. She exhibits no tenderness.   Abdominal: Soft. Bowel sounds are normal. She exhibits no distension and no mass. There is no tenderness. There is no rebound and no guarding. No hernia.   Musculoskeletal: Normal range of motion. She exhibits no edema, tenderness or deformity.   Lymphadenopathy:     She has no cervical adenopathy.   Neurological: She is alert and oriented to person, place, and time. She has normal reflexes. She displays normal reflexes. No cranial nerve deficit. She exhibits normal muscle tone. Coordination normal.   Skin: Skin is warm and dry. No rash noted.  She is not diaphoretic. No erythema. No pallor.   Edema to bilateral lower extremities    Psychiatric: She has a normal mood and affect. Her behavior is normal. Judgment and thought content normal.   Nursing note and vitals reviewed.    Results for orders placed or performed in visit on 07/29/19   T3, Free   Result Value Ref Range    T3, Free 2.4 2.0 - 4.4 pg/mL   T4 & TSH ([facility])   Result Value Ref Range    TSH 1.030 0.270 - 4.200 uIU/mL    T4, Total 7.37 4.50 - 11.70 mcg/dL   T4, Free   Result Value Ref Range    Free T4 1.43 0.93 - 1.70 ng/dL   Uric Acid   Result Value Ref Range    Uric Acid 4.2 2.4 - 5.7 mg/dL   Vitamin D 25 Hydroxy   Result Value Ref Range    25 Hydroxy, Vitamin D 51.1 30.0 - 100.0 ng/ml   Comprehensive Metabolic Panel   Result Value Ref Range    Glucose 150 (H) 65 - 99 mg/dL    BUN 14 8 - 23 mg/dL    Creatinine 1.31 (H) 0.57 - 1.00 mg/dL    eGFR Non African Am 41 (L) >60 mL/min/1.73    eGFR African Am 50 (L) >60 mL/min/1.73    BUN/Creatinine Ratio 10.7 7.0 - 25.0    Sodium 143 136 - 145 mmol/L    Potassium 4.7 3.5 - 5.2 mmol/L    Chloride 106 98 - 107 mmol/L    Total CO2 26.2 22.0 - 29.0 mmol/L    Calcium 9.0 8.6 - 10.5 mg/dL    Total Protein 6.0 6.0 - 8.5 g/dL    Albumin 4.00 3.50 - 5.20 g/dL    Globulin 2.0 gm/dL    A/G Ratio 2.0 g/dL    Total Bilirubin 0.4 0.2 - 1.2 mg/dL    Alkaline Phosphatase 82 39 - 117 U/L    AST (SGOT) 18 1 - 32 U/L    ALT (SGPT) 13 1 - 33 U/L   Hemoglobin A1c   Result Value Ref Range    Hemoglobin A1C 7.60 (H) 4.80 - 5.60 %   Lipid Panel   Result Value Ref Range    Total Cholesterol 132 0 - 200 mg/dL    Triglycerides 71 0 - 150 mg/dL    HDL Cholesterol 58 40 - 60 mg/dL    VLDL Cholesterol 14.2 mg/dL    LDL Cholesterol  60 0 - 100 mg/dL   MicroAlbumin, Urine, Random - Urine, Clean Catch   Result Value Ref Range    Microalbumin, Urine 10.0 Not Estab. ug/mL   Cardiovascular Risk Assessment   Result Value Ref Range    Interpretation Note    Diabetes Patient Education    Result Value Ref Range    PDF Image Not applicable          Assessment/Plan   Problems Addressed this Visit        Cardiovascular and Mediastinum    HTN (hypertension)       Digestive    Vitamin D deficiency       Endocrine    Diabetes mellitus type 1, uncontrolled (CMS/[facility]) - Primary    Primary hypothyroidism       Other    Dyslipidemia    Insulin pump titration        Patient was seen and examined.  Her sensor and pump download were both reviewed.  She is having high and low blood sugar readings however it is not paired with the pump therefore she has no automatic mode to benefit her overall glycemic control.  She is getting frustrated with having to wear to devices and not having the benefit of the automatic mode to control her blood sugars.  Her blood pressure is in satisfactory range.  Thyroid is stable.  Vitamin D is stable.  Her cholesterol is controlled.  We will follow-up with [employer] to find out the status of her patient assistance program hopes of getting her the sensor that can be pared with the pump.  Currently is not approved under Medicare.  She will follow-up every 3 months and alternate with dr carrero and myself with labs prior.  She is been encouraged to reach out should she have any concerns prior to her next visit.  Her A1c is 7.6

## 2019-10-16 DIAGNOSIS — R53.82 CHRONIC FATIGUE: ICD-10-CM

## 2019-10-16 DIAGNOSIS — E78.5 DYSLIPIDEMIA: ICD-10-CM

## 2019-10-16 DIAGNOSIS — I10 ESSENTIAL HYPERTENSION: ICD-10-CM

## 2019-10-16 DIAGNOSIS — Z46.81 INSULIN PUMP TITRATION: ICD-10-CM

## 2019-10-16 DIAGNOSIS — E55.9 AVITAMINOSIS D: ICD-10-CM

## 2019-10-17 RX ORDER — RALOXIFENE HYDROCHLORIDE 60 MG/1
TABLET, FILM COATED ORAL
Qty: 90 TABLET | Refills: 0 | Status: SHIPPED | OUTPATIENT
Start: 2019-10-17 | End: 2020-01-03 | Stop reason: SDUPTHER

## 2019-10-17 RX ORDER — ERGOCALCIFEROL 1.25 MG/1
CAPSULE ORAL
Qty: 13 CAPSULE | Refills: 0 | Status: SHIPPED | OUTPATIENT
Start: 2019-10-17 | End: 2020-01-03 | Stop reason: SDUPTHER

## 2019-10-31 RX ORDER — NEBIVOLOL HYDROCHLORIDE 10 MG/1
10 TABLET ORAL DAILY
Qty: 90 TABLET | Refills: 0 | Status: SHIPPED | OUTPATIENT
Start: 2019-10-31 | End: 2020-01-03 | Stop reason: SDUPTHER

## 2019-10-31 RX ORDER — ROSUVASTATIN CALCIUM 10 MG/1
10 TABLET, COATED ORAL NIGHTLY
Qty: 90 TABLET | Refills: 0 | Status: SHIPPED | OUTPATIENT
Start: 2019-10-31 | End: 2020-01-03 | Stop reason: SDUPTHER

## 2019-11-14 ENCOUNTER — TELEPHONE (OUTPATIENT)
Dept: ENDOCRINOLOGY | Age: 64
End: 2019-11-14

## 2019-11-14 RX ORDER — LEVOTHYROXINE SODIUM 75 MCG
75 TABLET ORAL DAILY
Qty: 90 TABLET | Refills: 0 | Status: SHIPPED | OUTPATIENT
Start: 2019-11-14 | End: 2020-01-03 | Stop reason: SDUPTHER

## 2019-11-14 NOTE — TELEPHONE ENCOUNTER
Patient said she requested Synthroiid refill, .075mg, 1xday, 90 day supply from   [phone] and they have not heard back from our office.     Pt - [phone]

## 2019-12-06 RX ORDER — BUPROPION HYDROCHLORIDE 300 MG/1
300 TABLET ORAL EVERY MORNING
Qty: 90 TABLET | Refills: 0 | Status: SHIPPED | OUTPATIENT
Start: 2019-12-06 | End: 2020-03-04

## 2019-12-20 DIAGNOSIS — E55.9 VITAMIN D DEFICIENCY: Primary | ICD-10-CM

## 2019-12-20 DIAGNOSIS — E03.9 PRIMARY HYPOTHYROIDISM: ICD-10-CM

## 2019-12-20 DIAGNOSIS — R53.82 CHRONIC FATIGUE: ICD-10-CM

## 2019-12-20 DIAGNOSIS — M81.0 OSTEOPOROSIS, UNSPECIFIED OSTEOPOROSIS TYPE, UNSPECIFIED PATHOLOGICAL FRACTURE PRESENCE: ICD-10-CM

## 2019-12-20 DIAGNOSIS — E16.2 HYPOGLYCEMIA: ICD-10-CM

## 2019-12-20 DIAGNOSIS — E78.5 DYSLIPIDEMIA: ICD-10-CM

## 2019-12-20 DIAGNOSIS — E10.69 TYPE 1 DIABETES MELLITUS WITH OTHER SPECIFIED COMPLICATION (HCC): ICD-10-CM

## 2019-12-20 DIAGNOSIS — E55.9 VITAMIN D DEFICIENCY: ICD-10-CM

## 2019-12-20 DIAGNOSIS — E10.65 UNCONTROLLED TYPE 1 DIABETES MELLITUS WITH HYPERGLYCEMIA (HCC): ICD-10-CM

## 2019-12-20 DIAGNOSIS — Z46.81 INSULIN PUMP TITRATION: ICD-10-CM

## 2019-12-21 LAB
25(OH)D3+25(OH)D2 SERPL-MCNC: 62 NG/ML (ref 30–100)
ALBUMIN SERPL-MCNC: 4 G/DL (ref 3.5–5.2)
ALBUMIN/GLOB SERPL: 1.7 G/DL
ALP SERPL-CCNC: 79 U/L (ref 39–117)
ALT SERPL-CCNC: 12 U/L (ref 1–33)
AST SERPL-CCNC: 18 U/L (ref 1–32)
BILIRUB SERPL-MCNC: 0.4 MG/DL (ref 0.2–1.2)
BUN SERPL-MCNC: 18 MG/DL (ref 8–23)
BUN/CREAT SERPL: 11.8 (ref 7–25)
C PEPTIDE SERPL-MCNC: <0.1 NG/ML (ref 1.1–4.4)
CALCIUM SERPL-MCNC: 9.4 MG/DL (ref 8.6–10.5)
CHLORIDE SERPL-SCNC: 104 MMOL/L (ref 98–107)
CHOLEST SERPL-MCNC: 132 MG/DL (ref 0–200)
CO2 SERPL-SCNC: 25.9 MMOL/L (ref 22–29)
CREAT SERPL-MCNC: 1.53 MG/DL (ref 0.57–1)
GLOBULIN SER CALC-MCNC: 2.3 GM/DL
GLUCOSE SERPL-MCNC: 234 MG/DL (ref 65–99)
HBA1C MFR BLD: 7.9 % (ref 4.8–5.6)
HDLC SERPL-MCNC: 62 MG/DL (ref 40–60)
INTERPRETATION: NORMAL
LDLC SERPL CALC-MCNC: 56 MG/DL (ref 0–100)
Lab: NORMAL
POTASSIUM SERPL-SCNC: 4.7 MMOL/L (ref 3.5–5.2)
PROT SERPL-MCNC: 6.3 G/DL (ref 6–8.5)
SODIUM SERPL-SCNC: 141 MMOL/L (ref 136–145)
T3FREE SERPL-MCNC: 3 PG/ML (ref 2–4.4)
T4 FREE SERPL-MCNC: 1.49 NG/DL (ref 0.93–1.7)
T4 SERPL-MCNC: 7.5 MCG/DL (ref 4.5–11.7)
TRIGL SERPL-MCNC: 68 MG/DL (ref 0–150)
TSH SERPL DL<=0.005 MIU/L-ACNC: 1.11 UIU/ML (ref 0.27–4.2)
URATE SERPL-MCNC: 4.7 MG/DL (ref 2.4–5.7)
VLDLC SERPL CALC-MCNC: 13.6 MG/DL

## 2019-12-30 RX ORDER — FOLIC ACID 1 MG/1
TABLET ORAL
Qty: 90 TABLET | Refills: 0 | Status: SHIPPED | OUTPATIENT
Start: 2019-12-30 | End: 2020-03-31

## 2019-12-30 RX ORDER — INSULIN GLULISINE 100 [IU]/ML
INJECTION, SOLUTION SUBCUTANEOUS
Qty: 90 ML | Refills: 3 | Status: SHIPPED | OUTPATIENT
Start: 2019-12-30 | End: 2020-01-03 | Stop reason: SDUPTHER

## 2020-01-03 ENCOUNTER — OFFICE VISIT (OUTPATIENT)
Dept: ENDOCRINOLOGY | Age: 65
End: 2020-01-03

## 2020-01-03 VITALS — BODY MASS INDEX: 31.26 KG/M2 | HEIGHT: 61 IN | RESPIRATION RATE: 16 BRPM | WEIGHT: 165.6 LBS

## 2020-01-03 DIAGNOSIS — R53.82 CHRONIC FATIGUE: ICD-10-CM

## 2020-01-03 DIAGNOSIS — E03.9 PRIMARY HYPOTHYROIDISM: ICD-10-CM

## 2020-01-03 DIAGNOSIS — E78.5 DYSLIPIDEMIA: ICD-10-CM

## 2020-01-03 DIAGNOSIS — E55.9 VITAMIN D DEFICIENCY: ICD-10-CM

## 2020-01-03 DIAGNOSIS — E10.65 UNCONTROLLED TYPE 1 DIABETES MELLITUS WITH HYPERGLYCEMIA (HCC): Primary | ICD-10-CM

## 2020-01-03 DIAGNOSIS — Z46.81 INSULIN PUMP TITRATION: ICD-10-CM

## 2020-01-03 DIAGNOSIS — M81.0 OSTEOPOROSIS, UNSPECIFIED OSTEOPOROSIS TYPE, UNSPECIFIED PATHOLOGICAL FRACTURE PRESENCE: ICD-10-CM

## 2020-01-03 DIAGNOSIS — I10 ESSENTIAL HYPERTENSION: ICD-10-CM

## 2020-01-03 DIAGNOSIS — Z78.0 MENOPAUSE: ICD-10-CM

## 2020-01-03 DIAGNOSIS — E55.9 AVITAMINOSIS D: ICD-10-CM

## 2020-01-03 PROCEDURE — 99214 OFFICE O/P EST MOD 30 MIN: CPT | Performed by: INTERNAL MEDICINE

## 2020-01-03 RX ORDER — NEBIVOLOL HYDROCHLORIDE 10 MG/1
10 TABLET ORAL DAILY
Qty: 90 TABLET | Refills: 3 | Status: SHIPPED | OUTPATIENT
Start: 2020-01-03 | End: 2020-07-02 | Stop reason: SDUPTHER

## 2020-01-03 RX ORDER — RALOXIFENE HYDROCHLORIDE 60 MG/1
60 TABLET, FILM COATED ORAL DAILY
Qty: 90 TABLET | Refills: 3 | Status: SHIPPED | OUTPATIENT
Start: 2020-01-03 | End: 2021-01-11 | Stop reason: SDUPTHER

## 2020-01-03 RX ORDER — ROSUVASTATIN CALCIUM 10 MG/1
10 TABLET, COATED ORAL NIGHTLY
Qty: 90 TABLET | Refills: 3 | Status: SHIPPED | OUTPATIENT
Start: 2020-01-03 | End: 2020-07-02 | Stop reason: SDUPTHER

## 2020-01-03 RX ORDER — INSULIN GLULISINE 100 [IU]/ML
INJECTION, SOLUTION SUBCUTANEOUS
Qty: 90 ML | Refills: 3 | Status: SHIPPED | OUTPATIENT
Start: 2020-01-03 | End: 2020-07-02 | Stop reason: SDUPTHER

## 2020-01-03 RX ORDER — LEVOTHYROXINE SODIUM 75 MCG
75 TABLET ORAL DAILY
Qty: 90 TABLET | Refills: 3 | Status: SHIPPED | OUTPATIENT
Start: 2020-01-03 | End: 2020-07-02 | Stop reason: SDUPTHER

## 2020-01-03 RX ORDER — LISINOPRIL 5 MG/1
5 TABLET ORAL DAILY
Qty: 90 TABLET | Refills: 3 | Status: SHIPPED | OUTPATIENT
Start: 2020-01-03 | End: 2020-03-31

## 2020-01-03 RX ORDER — ERGOCALCIFEROL 1.25 MG/1
50000 CAPSULE ORAL
Qty: 13 CAPSULE | Refills: 3 | Status: SHIPPED | OUTPATIENT
Start: 2020-01-03 | End: 2020-07-02 | Stop reason: SDUPTHER

## 2020-01-03 RX ORDER — LISINOPRIL 5 MG/1
5 TABLET ORAL DAILY
COMMUNITY
End: 2020-01-03 | Stop reason: SDUPTHER

## 2020-01-03 NOTE — PROGRESS NOTES
"Subjective   Dayanna Lewis is a 64 y.o. female seen for follow up for DM1, hyperlipidemia, HTN, vit d deficiency, hypothyroidism, lab review. Patient is currently using a [employer] insulin pump and changing pump sites every 3 days. She is checking BG 3 times a day. She denies any problems or concerns.     History of Present Illness this is a 64-year-old female known patient with type 1 diabetes hypertension and dyslipidemia as well as vitamin D deficiency and hyperuricemia with osteoporosis.  Over the course of last 6 months she has had no significant health problem for which to go to the emergency room or hospital.  She is currently on [employer] insulin pump with which she is very satisfied and happy.    Resp 16   Ht 154.9 cm (61\")   Wt 75.1 kg (165 lb 9.6 oz)   LMP  (LMP Unknown)   BMI 31.29 kg/m²      No Known Allergies    Current Outpatient Medications:   •  acetone, urine, test strip, Use as directed, Disp: 50 each, Rfl: 1  •  APIDRA 100 UNIT/ML injection, INJECT 100 UNITS UNDER THE SKIN EVERY DAY, Disp: 90 mL, Rfl: 3  •  aspirin 81 MG tablet, Take 1 tablet by mouth daily., Disp: , Rfl:   •  aspirin-sod bicarb-citric acid (JAH-SELTZER) 325 MG effervescent tablet, Take 325 mg by mouth Every 6 (Six) Hours As Needed., Disp: , Rfl:   •  buPROPion XL (WELLBUTRIN XL) 300 MG 24 hr tablet, TAKE 1 TABLET BY MOUTH EVERY MORNING, Disp: 90 tablet, Rfl: 0  •  BYSTOLIC 10 MG tablet, TAKE 1 TABLET BY MOUTH DAILY, Disp: 90 tablet, Rfl: 0  •  folic acid (FOLVITE) 1 MG tablet, TAKE 1 TABLET BY MOUTH DAILY, Disp: 90 tablet, Rfl: 0  •  glucagon (GLUCAGON EMERGENCY) 1 MG injection, Use as directed, Disp: 2 kit, Rfl: 5  •  glucose blood test strip, Mari Contour Next Test In Vitro Strip; Patient Sig: Mari Contour Next Test In Vitro Strip test 8 times daily; 240; 5; 10-Nov-2014; Active, Disp: , Rfl:   •  LANTUS 100 UNIT/ML injection, 21 units subcutaneous daily, Disp: 10 mL, Rfl: 5  •  lisinopril (PRINIVIL,ZESTRIL) 5 MG tablet, " Take 5 mg by mouth Daily., Disp: , Rfl:   •  Multiple Minerals-Vitamins (CITRACAL PLUS BONE DENSITY) tablet, Take by mouth., Disp: , Rfl:   •  raloxifene (EVISTA) 60 MG tablet, TAKE 1 TABLET BY MOUTH EVERY DAY, Disp: 90 tablet, Rfl: 0  •  rosuvastatin (CRESTOR) 10 MG tablet, TAKE 1 TABLET BY MOUTH EVERY NIGHT, Disp: 90 tablet, Rfl: 0  •  SYNTHROID 75 MCG tablet, Take 1 tablet by mouth Daily., Disp: 90 tablet, Rfl: 0  •  vitamin D (ERGOCALCIFEROL) 05175 units capsule capsule, TAKE 1 CAPSULE BY MOUTH EVERY 7 DAYS, Disp: 13 capsule, Rfl: 0  •  vitamin E 400 UNIT capsule, Take 1 capsule by mouth daily., Disp: , Rfl:       The following portions of the patient's history were reviewed and updated as appropriate: allergies, current medications, past family history, past medical history, past social history, past surgical history and problem list.    Review of Systems   Constitutional: Negative.    HENT: Negative.    Eyes: Negative.    Respiratory: Negative.    Cardiovascular: Negative.    Gastrointestinal: Negative.    Endocrine: Negative.    Genitourinary: Negative.    Musculoskeletal: Negative.    Skin: Negative.    Allergic/Immunologic: Negative.    Neurological: Negative.    Hematological: Negative.    Psychiatric/Behavioral: Negative.    The above review of system was reviewed, all corroborated and accepted.    Objective   Physical Exam   Constitutional: She is oriented to person, place, and time. She appears well-developed and well-nourished. No distress.   HENT:   Head: Normocephalic and atraumatic.   Right Ear: External ear normal.   Left Ear: External ear normal.   Nose: Nose normal.   Mouth/Throat: Oropharynx is clear and moist. No oropharyngeal exudate.   Eyes: Pupils are equal, round, and reactive to light. Conjunctivae and EOM are normal. Right eye exhibits no discharge. Left eye exhibits no discharge. No scleral icterus.   Neck: Normal range of motion. Neck supple. No JVD present. No tracheal deviation  present. No thyromegaly present.   Cardiovascular: Normal rate, regular rhythm, normal heart sounds and intact distal pulses. Exam reveals no gallop and no friction rub.   No murmur heard.  Pulmonary/Chest: Effort normal and breath sounds normal. No stridor. No respiratory distress. She has no wheezes. She has no rales. She exhibits no tenderness.   Abdominal: Soft. Bowel sounds are normal. She exhibits no distension and no mass. There is no tenderness. There is no rebound and no guarding. No hernia.   Musculoskeletal: Normal range of motion. She exhibits no edema, tenderness or deformity.   Lymphadenopathy:     She has no cervical adenopathy.   Neurological: She is alert and oriented to person, place, and time. She has normal reflexes. She displays normal reflexes. No cranial nerve deficit or sensory deficit. She exhibits normal muscle tone. Coordination normal.   Skin: Skin is warm and dry. No rash noted. She is not diaphoretic. No erythema. No pallor.   Psychiatric: She has a normal mood and affect. Her behavior is normal. Judgment and thought content normal.   Nursing note and vitals reviewed.  No significant change since 4/10/2019 office visit.    Results for orders placed or performed in visit on 12/20/19   TSH   Result Value Ref Range    TSH 1.110 0.270 - 4.200 uIU/mL   T4   Result Value Ref Range    T4, Total 7.50 4.50 - 11.70 mcg/dL   Vitamin D 25 Hydroxy   Result Value Ref Range    25 Hydroxy, Vitamin D 62.0 30.0 - 100.0 ng/ml   Uric Acid   Result Value Ref Range    Uric Acid 4.7 2.4 - 5.7 mg/dL   T4, Free   Result Value Ref Range    Free T4 1.49 0.93 - 1.70 ng/dL   T3, Free   Result Value Ref Range    T3, Free 3.0 2.0 - 4.4 pg/mL   Lipid Panel   Result Value Ref Range    Total Cholesterol 132 0 - 200 mg/dL    Triglycerides 68 0 - 150 mg/dL    HDL Cholesterol 62 (H) 40 - 60 mg/dL    VLDL Cholesterol 13.6 mg/dL    LDL Cholesterol  56 0 - 100 mg/dL   Hemoglobin A1c   Result Value Ref Range    Hemoglobin A1C  7.90 (H) 4.80 - 5.60 %   C-Peptide   Result Value Ref Range    C-Peptide <0.1 (L) 1.1 - 4.4 ng/mL   Comprehensive Metabolic Panel   Result Value Ref Range    Glucose 234 (H) 65 - 99 mg/dL    BUN 18 8 - 23 mg/dL    Creatinine 1.53 (H) 0.57 - 1.00 mg/dL    eGFR Non African Am 34 (L) >60 mL/min/1.73    eGFR  Am 42 (L) >60 mL/min/1.73    BUN/Creatinine Ratio 11.8 7.0 - 25.0    Sodium 141 136 - 145 mmol/L    Potassium 4.7 3.5 - 5.2 mmol/L    Chloride 104 98 - 107 mmol/L    Total CO2 25.9 22.0 - 29.0 mmol/L    Calcium 9.4 8.6 - 10.5 mg/dL    Total Protein 6.3 6.0 - 8.5 g/dL    Albumin 4.00 3.50 - 5.20 g/dL    Globulin 2.3 gm/dL    A/G Ratio 1.7 g/dL    Total Bilirubin 0.4 0.2 - 1.2 mg/dL    Alkaline Phosphatase 79 39 - 117 U/L    AST (SGOT) 18 1 - 32 U/L    ALT (SGPT) 12 1 - 33 U/L   Cardiovascular Risk Assessment   Result Value Ref Range    Interpretation Note    Diabetes Patient Education   Result Value Ref Range    PDF Image Not applicable          Assessment/Plan   Diagnoses and all orders for this visit:    Uncontrolled type 1 diabetes mellitus with hyperglycemia (CMS/[facility])  -     T3, Free; Future  -     T4 & TSH ([facility]); Future  -     T4, Free; Future  -     Uric Acid; Future  -     Vitamin D 25 Hydroxy; Future  -     Comprehensive Metabolic Panel; Future  -     C-Peptide; Future  -     Hemoglobin A1c; Future  -     Lipid Panel; Future  -     MicroAlbumin, Urine, Random - Urine, Clean Catch; Future  -     Glutamic Acid Decarboxylase; Future    Essential hypertension  -     raloxifene (EVISTA) 60 MG tablet; Take 1 tablet by mouth Daily.  -     T3, Free; Future  -     T4 & TSH ([facility]); Future  -     T4, Free; Future  -     Uric Acid; Future  -     Vitamin D 25 Hydroxy; Future  -     Comprehensive Metabolic Panel; Future  -     C-Peptide; Future  -     Hemoglobin A1c; Future  -     Lipid Panel; Future  -     MicroAlbumin, Urine, Random - Urine, Clean Catch; Future  -     Glutamic Acid Decarboxylase;  Future    Vitamin D deficiency  -     T3, Free; Future  -     T4 & TSH ([facility]); Future  -     T4, Free; Future  -     Uric Acid; Future  -     Vitamin D 25 Hydroxy; Future  -     Comprehensive Metabolic Panel; Future  -     C-Peptide; Future  -     Hemoglobin A1c; Future  -     Lipid Panel; Future  -     MicroAlbumin, Urine, Random - Urine, Clean Catch; Future  -     Glutamic Acid Decarboxylase; Future    Primary hypothyroidism  -     T3, Free; Future  -     T4 & TSH ([facility]); Future  -     T4, Free; Future  -     Uric Acid; Future  -     Vitamin D 25 Hydroxy; Future  -     Comprehensive Metabolic Panel; Future  -     C-Peptide; Future  -     Hemoglobin A1c; Future  -     Lipid Panel; Future  -     MicroAlbumin, Urine, Random - Urine, Clean Catch; Future  -     Glutamic Acid Decarboxylase; Future    Osteoporosis, unspecified osteoporosis type, unspecified pathological fracture presence  -     T3, Free; Future  -     T4 & TSH ([facility]); Future  -     T4, Free; Future  -     Uric Acid; Future  -     Vitamin D 25 Hydroxy; Future  -     Comprehensive Metabolic Panel; Future  -     C-Peptide; Future  -     Hemoglobin A1c; Future  -     Lipid Panel; Future  -     MicroAlbumin, Urine, Random - Urine, Clean Catch; Future  -     Glutamic Acid Decarboxylase; Future    Menopause  -     T3, Free; Future  -     T4 & TSH ([facility]); Future  -     T4, Free; Future  -     Uric Acid; Future  -     Vitamin D 25 Hydroxy; Future  -     Comprehensive Metabolic Panel; Future  -     C-Peptide; Future  -     Hemoglobin A1c; Future  -     Lipid Panel; Future  -     MicroAlbumin, Urine, Random - Urine, Clean Catch; Future  -     Glutamic Acid Decarboxylase; Future    Dyslipidemia  -     raloxifene (EVISTA) 60 MG tablet; Take 1 tablet by mouth Daily.  -     T3, Free; Future  -     T4 & TSH ([facility]); Future  -     T4, Free; Future  -     Uric Acid; Future  -     Vitamin D 25 Hydroxy; Future  -     Comprehensive Metabolic Panel; Future  -      C-Peptide; Future  -     Hemoglobin A1c; Future  -     Lipid Panel; Future  -     MicroAlbumin, Urine, Random - Urine, Clean Catch; Future  -     Glutamic Acid Decarboxylase; Future    Chronic fatigue  -     raloxifene (EVISTA) 60 MG tablet; Take 1 tablet by mouth Daily.  -     T3, Free; Future  -     T4 & TSH ([facility]); Future  -     T4, Free; Future  -     Uric Acid; Future  -     Vitamin D 25 Hydroxy; Future  -     Comprehensive Metabolic Panel; Future  -     C-Peptide; Future  -     Hemoglobin A1c; Future  -     Lipid Panel; Future  -     MicroAlbumin, Urine, Random - Urine, Clean Catch; Future  -     Glutamic Acid Decarboxylase; Future    Insulin pump titration  -     raloxifene (EVISTA) 60 MG tablet; Take 1 tablet by mouth Daily.  -     T3, Free; Future  -     T4 & TSH ([facility]); Future  -     T4, Free; Future  -     Uric Acid; Future  -     Vitamin D 25 Hydroxy; Future  -     Comprehensive Metabolic Panel; Future  -     C-Peptide; Future  -     Hemoglobin A1c; Future  -     Lipid Panel; Future  -     MicroAlbumin, Urine, Random - Urine, Clean Catch; Future  -     Glutamic Acid Decarboxylase; Future    Avitaminosis D  -     raloxifene (EVISTA) 60 MG tablet; Take 1 tablet by mouth Daily.  -     T3, Free; Future  -     T4 & TSH ([facility]); Future  -     T4, Free; Future  -     Uric Acid; Future  -     Vitamin D 25 Hydroxy; Future  -     Comprehensive Metabolic Panel; Future  -     C-Peptide; Future  -     Hemoglobin A1c; Future  -     Lipid Panel; Future  -     MicroAlbumin, Urine, Random - Urine, Clean Catch; Future  -     Glutamic Acid Decarboxylase; Future    Other orders  -     vitamin D (ERGOCALCIFEROL) 1.25 MG (57165 UT) capsule capsule; Take 1 capsule by mouth Every 7 (Seven) Days.  -     SYNTHROID 75 MCG tablet; Take 1 tablet by mouth Daily.  -     rosuvastatin (CRESTOR) 10 MG tablet; Take 1 tablet by mouth Every Night.  -     lisinopril (PRINIVIL,ZESTRIL) 5 MG tablet; Take 1 tablet by mouth Daily.  -      BYSTOLIC 10 MG tablet; Take 1 tablet by mouth Daily.  -     APIDRA 100 UNIT/ML injection; INJECT 100 UNITS UNDER THE SKIN EVERY DAY      In summary I saw and examined this 64-year-old female for above-mentioned problems.  I reviewed her laboratory evaluation of 12/20/2019 and provided her with a hard copy of it.  With the exception of a slight elevation of hemoglobin A1c to 7.9 she is otherwise clinically and metabolically stable.  She will also get together with Ms. Solange Teresa from [employer] for further improvement in her pump setting.  We will continue her prescriptions and schedule her to see Ms. Pamela Najera in 4 months or sooner if needed but laboratory evaluation prior to each office visit.

## 2020-03-04 RX ORDER — BUPROPION HYDROCHLORIDE 300 MG/1
300 TABLET ORAL EVERY MORNING
Qty: 90 TABLET | Refills: 0 | Status: SHIPPED | OUTPATIENT
Start: 2020-03-04 | End: 2020-05-28

## 2020-03-20 ENCOUNTER — LAB (OUTPATIENT)
Dept: ENDOCRINOLOGY | Age: 65
End: 2020-03-20

## 2020-03-20 DIAGNOSIS — E03.9 PRIMARY HYPOTHYROIDISM: ICD-10-CM

## 2020-03-20 DIAGNOSIS — I10 ESSENTIAL HYPERTENSION: Primary | ICD-10-CM

## 2020-03-20 DIAGNOSIS — R53.82 CHRONIC FATIGUE: ICD-10-CM

## 2020-03-20 DIAGNOSIS — R53.83 FATIGUE DUE TO DEPRESSION: ICD-10-CM

## 2020-03-20 DIAGNOSIS — Z46.81 INSULIN PUMP TITRATION: ICD-10-CM

## 2020-03-20 DIAGNOSIS — Z78.0 MENOPAUSE: ICD-10-CM

## 2020-03-20 DIAGNOSIS — F32.A FATIGUE DUE TO DEPRESSION: ICD-10-CM

## 2020-03-20 DIAGNOSIS — E55.9 VITAMIN D DEFICIENCY: ICD-10-CM

## 2020-03-20 DIAGNOSIS — E08.00 DIABETES MELLITUS DUE TO UNDERLYING CONDITION WITH HYPEROSMOLARITY WITHOUT COMA, WITHOUT LONG-TERM CURRENT USE OF INSULIN (HCC): ICD-10-CM

## 2020-03-20 DIAGNOSIS — M81.0 OSTEOPOROSIS, UNSPECIFIED OSTEOPOROSIS TYPE, UNSPECIFIED PATHOLOGICAL FRACTURE PRESENCE: ICD-10-CM

## 2020-03-20 DIAGNOSIS — I10 ESSENTIAL HYPERTENSION: ICD-10-CM

## 2020-03-20 DIAGNOSIS — E10.65 UNCONTROLLED TYPE 1 DIABETES MELLITUS WITH HYPERGLYCEMIA (HCC): ICD-10-CM

## 2020-03-20 DIAGNOSIS — E78.5 DYSLIPIDEMIA: ICD-10-CM

## 2020-03-20 DIAGNOSIS — E55.9 AVITAMINOSIS D: ICD-10-CM

## 2020-03-20 DIAGNOSIS — E16.2 HYPOGLYCEMIA: ICD-10-CM

## 2020-03-20 DIAGNOSIS — E10.641 UNCONTROLLED TYPE 1 DIABETES MELLITUS WITH HYPOGLYCEMIA AND COMA (HCC): ICD-10-CM

## 2020-03-22 LAB
25(OH)D3+25(OH)D2 SERPL-MCNC: 52.3 NG/ML (ref 30–100)
ALBUMIN SERPL-MCNC: 3.8 G/DL (ref 3.5–5.2)
ALBUMIN/GLOB SERPL: 1.7 G/DL
ALP SERPL-CCNC: 86 U/L (ref 39–117)
ALT SERPL-CCNC: 15 U/L (ref 1–33)
AST SERPL-CCNC: 17 U/L (ref 1–32)
BILIRUB SERPL-MCNC: 0.5 MG/DL (ref 0.2–1.2)
BUN SERPL-MCNC: 13 MG/DL (ref 8–23)
BUN/CREAT SERPL: 9 (ref 7–25)
CALCIUM SERPL-MCNC: 9.1 MG/DL (ref 8.6–10.5)
CHLORIDE SERPL-SCNC: 103 MMOL/L (ref 98–107)
CHOLEST SERPL-MCNC: 146 MG/DL (ref 0–200)
CO2 SERPL-SCNC: 26.4 MMOL/L (ref 22–29)
CREAT SERPL-MCNC: 1.45 MG/DL (ref 0.57–1)
FT4I SERPL CALC-MCNC: 2 (ref 1.2–4.9)
GLOBULIN SER CALC-MCNC: 2.2 GM/DL
GLUCOSE SERPL-MCNC: 214 MG/DL (ref 65–99)
HBA1C MFR BLD: 10.9 % (ref 4.8–5.6)
HDLC SERPL-MCNC: 65 MG/DL (ref 40–60)
INTERPRETATION: NORMAL
LDLC SERPL CALC-MCNC: 57 MG/DL (ref 0–100)
Lab: NORMAL
POTASSIUM SERPL-SCNC: 4.8 MMOL/L (ref 3.5–5.2)
PROT SERPL-MCNC: 6 G/DL (ref 6–8.5)
SODIUM SERPL-SCNC: 139 MMOL/L (ref 136–145)
T3FREE SERPL-MCNC: 2.8 PG/ML (ref 2–4.4)
T3RU NFR SERPL: 28 % (ref 24–39)
T4 FREE SERPL-MCNC: 1.49 NG/DL (ref 0.93–1.7)
T4 SERPL-MCNC: 7.3 UG/DL (ref 4.5–12)
THYROGLOB AB SERPL-ACNC: <1 IU/ML
THYROGLOB SERPL-MCNC: 8.5 NG/ML
THYROGLOB SERPL-MCNC: NORMAL NG/ML
TRIGL SERPL-MCNC: 120 MG/DL (ref 0–150)
TSH SERPL DL<=0.005 MIU/L-ACNC: 1.51 UIU/ML (ref 0.45–4.5)
VLDLC SERPL CALC-MCNC: 24 MG/DL

## 2020-03-31 ENCOUNTER — E-VISIT (OUTPATIENT)
Dept: FAMILY MEDICINE CLINIC | Facility: TELEHEALTH | Age: 65
End: 2020-03-31

## 2020-03-31 DIAGNOSIS — R05.9 COUGH: Primary | ICD-10-CM

## 2020-03-31 PROCEDURE — 99422 OL DIG E/M SVC 11-20 MIN: CPT | Performed by: NURSE PRACTITIONER

## 2020-03-31 RX ORDER — FOLIC ACID 1 MG/1
TABLET ORAL
Qty: 90 TABLET | Refills: 0 | Status: SHIPPED | OUTPATIENT
Start: 2020-03-31 | End: 2020-06-25

## 2020-03-31 RX ORDER — OMEPRAZOLE 40 MG/1
40 CAPSULE, DELAYED RELEASE ORAL DAILY
Qty: 30 CAPSULE | Refills: 0 | Status: SHIPPED | OUTPATIENT
Start: 2020-03-31 | End: 2020-04-30

## 2020-03-31 NOTE — PATIENT INSTRUCTIONS
I recommend trying prilosec (omeprazole), but you also need to be evaluated because coughing with heartburn is a sign that the acid may be damage your esophagus and you may need and endoscopy. So its important that you see your primary care soon or we get in with somebody. In the meantime you definitely should try some prilosec, I sent a month's supply to your pharmacy.    Omeprazole tablets (OTC)  What is this medicine?  OMEPRAZOLE (oh ME pray zol) prevents the production of acid in the stomach. It is used to treat the symptoms of heartburn. You can buy this medicine without a prescription. This product is not for long-term use, unless otherwise directed by your doctor or health care professional.  This medicine may be used for other purposes; ask your health care provider or pharmacist if you have questions.  COMMON BRAND NAME(S): Prilosec OTC  What should I tell my health care provider before I take this medicine?  They need to know if you have any of these conditions:  -black or bloody stools  -chest pain  -difficulty swallowing  -have had heartburn for over 3 months  -have heartburn with dizziness, lightheadedness or sweating  -liver disease  -lupus  -stomach pain  -unexplained weight loss  -vomiting with blood  -wheezing  -an unusual or allergic reaction to omeprazole, other medicines, foods, dyes, or preservatives  -pregnant or trying to get pregnant  -breast-feeding  How should I use this medicine?  Take this medicine by mouth with a glass of water. Follow the directions on the product label. Do not cut, crush or chew this medicine. Swallow the tablets whole. Take this medicine on an empty stomach, at least 30 minutes before breakfast. Take your medicine at regular intervals. Do not take it more often than directed.  Talk to your pediatrician regarding the use of this medicine in children. Special care may be needed.  Overdosage: If you think you have taken too much of this medicine contact a poison control  center or emergency room at once.  NOTE: This medicine is only for you. Do not share this medicine with others.  What if I miss a dose?  If you miss a dose, take it as soon as you can. If it is almost time for your next dose, take only that dose. Do not take double or extra doses.  What may interact with this medicine?  Do not take this medicine with any of the following medications:  -atazanavir  -clopidogrel  -nelfinavir  -rilpivirine  This medicine may also interact with the following medications:  -antifungals like itraconazole, ketoconazole, and voriconazole  -certain antivirals for HIV or hepatitis  -certain medicines that treat or prevent blood clots like warfarin  -cilostazol  -citalopram  -cyclosporine  -dasatinib  -digoxin  -disulfiram  -diuretics  -erlotinib  -iron supplements  -medicines for anxiety, panic, and sleep like diazepam  -medicines for seizures like carbamazepine, phenobarbital, phenytoin  -methotrexate  -mycophenolate mofetil  -nilotinib  -rifampin  -Ritesh's wort  -tacrolimus  -vitamin B12  This list may not describe all possible interactions. Give your health care provider a list of all the medicines, herbs, non-prescription drugs, or dietary supplements you use. Also tell them if you smoke, drink alcohol, or use illegal drugs. Some items may interact with your medicine.  What should I watch for while using this medicine?  It can take several days before your heartburn gets better. Tell your healthcare professional if your symptoms do not start to get better or if they get worse. If you need to take this medicine for more than 14 days, talk to your healthcare professional. Heartburn may sometimes be caused by a more serious condition.  This medicine may cause a decrease in vitamin B12. You should make sure that you get enough vitamin B12 while you are taking this medicine. Discuss the foods you eat and the vitamins you take with your health care professional.  What side effects may I  notice from receiving this medicine?  Side effects that you should report to your doctor or health care professional as soon as possible:  -allergic reactions like skin rash, itching or hives, swelling of the face, lips, or tongue  -bone pain  -breathing problems  -fever or sore throat  -joint pain  -rash on cheeks or arms that gets worse in the sun  -redness, blistering, peeling, or loosening of the skin, including inside the mouth  -severe diarrhea  -signs and symptoms of kidney injury like trouble passing urine or change in the amount of urine  -signs and symptoms of low magnesium like muscle cramps; muscle pain; muscle weakness; tremors; seizures; or fast, irregular heartbeat  -stomach polyps  -unusual bleeding or bruising  Side effects that usually do not require medical attention (report to your doctor or health care professional if they continue or are bothersome):  -diarrhea  -dry mouth  -gas  -headache  -nausea  -stomach pain  This list may not describe all possible side effects. Call your doctor for medical advice about side effects. You may report side effects to FDA at 2-919-FDA-7895.  Where should I keep my medicine?  Keep out of the reach of children.  Store at room temperature between 20 and 25 degrees C (68 and 77 degrees F). Protect from light and moisture. Throw away any unused medicine after the expiration date.  NOTE: This sheet is a summary. It may not cover all possible information. If you have questions about this medicine, talk to your doctor, pharmacist, or health care provider.  © 2020 Elsevier/Gold Standard (2019-10-09 13:06:30)  Heartburn  Heartburn is a type of pain or discomfort that can happen in the throat or chest. It is often described as a burning pain. It may also cause a bad, acid-like taste in the mouth. Heartburn may feel worse when you lie down or bend over, and it is often worse at night. Heartburn may be caused by stomach contents that move back up into the esophagus  (reflux).  Follow these instructions at home:  Eating and drinking    · Avoid certain foods and drinks as told by your health care provider. This may include:  ? Coffee and tea (with or without caffeine).  ? Drinks that contain alcohol.  ? Energy drinks and sports drinks.  ? Carbonated drinks or sodas.  ? Chocolate and cocoa.  ? Peppermint and mint flavorings.  ? Garlic and onions.  ? Horseradish.  ? Spicy and acidic foods, including peppers, chili powder, corona powder, vinegar, hot sauces, and barbecue sauce.  ? Citrus fruit juices and citrus fruits, such as oranges, se, and limes.  ? Tomato-based foods, such as red sauce, chili, salsa, and pizza with red sauce.  ? Fried and fatty foods, such as donuts, french fries, potato chips, and high-fat dressings.  ? High-fat meats, such as hot dogs and fatty cuts of red and white meats, such as rib eye steak, sausage, ham, and sanchez.  ? High-fat dairy items, such as whole milk, butter, and cream cheese.  · Eat small, frequent meals instead of large meals.  · Avoid drinking large amounts of liquid with your meals.  · Avoid eating meals during the 2-3 hours before bedtime.  · Avoid lying down right after you eat.  · Do not exercise right after you eat.  Lifestyle         · If you are overweight, reduce your weight to an amount that is healthy for you. Ask your health care provider for guidance about a safe weight loss goal.  · Do not use any products that contain nicotine or tobacco, such as cigarettes, e-cigarettes, and chewing tobacco. These can make your symptoms worse. If you need help quitting, ask your health care provider.  · Wear loose-fitting clothing. Do not wear anything tight around your waist that causes pressure on your abdomen.  · Raise (elevate) the head of your bed about 6 inches (15 cm) when you sleep.  · Try to reduce your stress, such as with yoga or meditation. If you need help reducing stress, ask your health care provider.  General  instructions  · Pay attention to any changes in your symptoms.  · Take over-the-counter and prescription medicines only as told by your health care provider.  ? Do not take aspirin, ibuprofen, or other NSAIDs unless your health care provider told you to do so.  ? Stop medicines only as told by your health care provider. If you stop taking some medicines too quickly, your symptoms may get worse.  · Keep all follow-up visits as told by your health care provider. This is important.  Contact a health care provider if:  · You have new symptoms.  · You have unexplained weight loss.  · You have difficulty swallowing, or it hurts to swallow.  · You have wheezing or a persistent cough.  · Your symptoms do not improve with treatment.  · You have frequent heartburn for more than 2 weeks.  Get help right away if:  · You have pain in your arms, neck, jaw, teeth, or back.  · You feel sweaty, dizzy, or light-headed.  · You have chest pain or shortness of breath.  · You vomit and your vomit looks like blood or coffee grounds.  · Your stool is bloody or black.  These symptoms may represent a serious problem that is an emergency. Do not wait to see if the symptoms will go away. Get medical help right away. Call your local emergency services (911 in the [place] Do not drive yourself to the hospital.  Summary  · Heartburn is a type of pain or discomfort that can happen in the throat or chest. It is often described as a burning pain. It may also cause a bad, acid-like taste in the mouth.  · Avoid certain foods and drinks as told by your health care provider.  · Take over-the-counter and prescription medicines only as told by your health care provider. Do not take aspirin, ibuprofen, or other NSAIDs unless your health care provider told you to do so.  · Contact a health care provider if your symptoms do not improve or they get worse.  This information is not intended to replace advice given to you by your health care provider. Make sure  you discuss any questions you have with your health care provider.  Document Released: 05/06/2010 Document Revised: 05/20/2019 Document Reviewed: 05/20/2019  Elsevier Interactive Patient Education © 2020 [employer]

## 2020-03-31 NOTE — PROGRESS NOTES
Questionnaire reviewed. Advised she see PCP for further work up but will send prilosec for reflux symptoms in the mean time.    I spent 11-20 minutes in the patient's chart.

## 2020-04-03 ENCOUNTER — TELEPHONE (OUTPATIENT)
Dept: ENDOCRINOLOGY | Age: 65
End: 2020-04-03

## 2020-04-03 ENCOUNTER — TELEMEDICINE (OUTPATIENT)
Dept: ENDOCRINOLOGY | Age: 65
End: 2020-04-03

## 2020-04-03 ENCOUNTER — E-VISIT (OUTPATIENT)
Dept: FAMILY MEDICINE CLINIC | Facility: TELEHEALTH | Age: 65
End: 2020-04-03

## 2020-04-03 DIAGNOSIS — E55.9 VITAMIN D DEFICIENCY: ICD-10-CM

## 2020-04-03 DIAGNOSIS — E10.641 UNCONTROLLED TYPE 1 DIABETES MELLITUS WITH HYPOGLYCEMIA AND COMA (HCC): Primary | ICD-10-CM

## 2020-04-03 DIAGNOSIS — E03.9 PRIMARY HYPOTHYROIDISM: ICD-10-CM

## 2020-04-03 DIAGNOSIS — J02.9 ACUTE PHARYNGITIS, UNSPECIFIED ETIOLOGY: ICD-10-CM

## 2020-04-03 DIAGNOSIS — N28.9 RENAL INSUFFICIENCY: ICD-10-CM

## 2020-04-03 DIAGNOSIS — E78.5 DYSLIPIDEMIA: ICD-10-CM

## 2020-04-03 DIAGNOSIS — I10 ESSENTIAL HYPERTENSION: ICD-10-CM

## 2020-04-03 DIAGNOSIS — Z46.81 INSULIN PUMP TITRATION: ICD-10-CM

## 2020-04-03 DIAGNOSIS — J06.9 UPPER RESPIRATORY TRACT INFECTION, UNSPECIFIED TYPE: Primary | ICD-10-CM

## 2020-04-03 PROCEDURE — 99214 OFFICE O/P EST MOD 30 MIN: CPT | Performed by: NURSE PRACTITIONER

## 2020-04-03 PROCEDURE — 99422 OL DIG E/M SVC 11-20 MIN: CPT | Performed by: NURSE PRACTITIONER

## 2020-04-03 RX ORDER — BENZONATATE 200 MG/1
200 CAPSULE ORAL 3 TIMES DAILY PRN
Qty: 21 CAPSULE | Refills: 0 | Status: SHIPPED | OUTPATIENT
Start: 2020-04-03 | End: 2020-07-02 | Stop reason: ALTCHOICE

## 2020-04-03 RX ORDER — LORATADINE 10 MG/1
10 TABLET ORAL DAILY
Qty: 30 TABLET | Refills: 0 | Status: SHIPPED | OUTPATIENT
Start: 2020-04-03 | End: 2020-07-02 | Stop reason: ALTCHOICE

## 2020-04-03 RX ORDER — FLUTICASONE PROPIONATE 50 MCG
2 SPRAY, SUSPENSION (ML) NASAL DAILY
Qty: 1 BOTTLE | Refills: 0 | Status: SHIPPED | OUTPATIENT
Start: 2020-04-03 | End: 2020-04-03

## 2020-04-03 RX ORDER — FLUTICASONE PROPIONATE 50 MCG
SPRAY, SUSPENSION (ML) NASAL
Qty: 48 G | Refills: 0 | Status: SHIPPED | OUTPATIENT
Start: 2020-04-03 | End: 2020-07-02 | Stop reason: ALTCHOICE

## 2020-04-03 NOTE — PROGRESS NOTES
Dayanna Lewis    1955  [id number]    I have reviewed the e-Visit questionnaire and patient's answers, my assessment and plan are as follows:    Questionnaire: cough; no known exposure over past 14 days    HPI  2-3 week history of deep dry cough; intermittent spasms of cough; also runny nose, sore throat; she does not have fever; patient is Type 1 diabetic    Review of Systems - History obtained from the patient  ENT ROS: positive for - sore throat and rhinorrhea  negative for - headaches, nasal congestion, nasal discharge, sinus pain or sneezing  Respiratory ROS: positive for - cough  negative for - pleuritic pain, shortness of breath, sputum changes or wheezing      Diagnoses and all orders for this visit:    Upper respiratory tract infection, unspecified type  -     benzonatate (TESSALON) 200 MG capsule; Take 1 capsule by mouth 3 (Three) Times a Day As Needed for Cough.  --take as directed for cough  --may also use cough drops    Acute pharyngitis, unspecified etiology  -     loratadine (Claritin) 10 MG tablet; Take 1 tablet by mouth Daily.  -     fluticasone (FLONASE) 50 MCG/ACT nasal spray; 2 sprays into the nostril(s) as directed by provider Daily.  --start daily allergy medication and steroid nasal spray to decrease post nasal drainage  --warm salt water gargles and throat lozenges for comfort  --may also take tylenol for pain    **with respiratory symptoms, recommend quarantine for 14 days to help prevent spread of illness or oniel other illness    **if at any time, experiences fever AND/OR worsening cough AND/OR shortness of breath, has been advised to go to nearest urgent care or emergency department for evaluation AND/OR testing    **if no improvement, but not worsening, may schedule a f/u virtual visit or E-visit        Any medications prescribed have been sent electronically to   [id number] - [place], [place] - [address] AT [facility] [phone]  PH - [phone] FX  [address] [facility] [address]  Phone: [phone] Fax: [phone]      Time Documentation  Counseled patient  Counseling topics: diagnosis, treatment options and return instructions  Total encounter time: counseling time more than 50% of visit: 15 minutes        GABRIELA Sanches  04/03/20  9:38 AM

## 2020-04-03 NOTE — TELEPHONE ENCOUNTER
Pt will call me back re: Lab alisha catrachita, she needs to contact the billing dept. [phone]. DB

## 2020-04-03 NOTE — PROGRESS NOTES
Subjective   Dayanna Lewis is a 64 y.o. female is here today for follow-up.  Chief Complaint   Patient presents with   • Diabetes     recent labs bg 3x daily   • Hyperthyroidism   • Hypertension   • Vitamin D Deficiency     LMP  (LMP Unknown)   Current Outpatient Medications on File Prior to Visit   Medication Sig   • acetone, urine, test strip Use as directed   • APIDRA 100 UNIT/ML injection INJECT 100 UNITS UNDER THE SKIN EVERY DAY   • aspirin 81 MG tablet Take 1 tablet by mouth daily.   • aspirin-sod bicarb-citric acid (JAH-SELTZER) 325 MG effervescent tablet Take 325 mg by mouth Every 6 (Six) Hours As Needed.   • buPROPion XL (WELLBUTRIN XL) 300 MG 24 hr tablet TAKE 1 TABLET BY MOUTH EVERY MORNING   • BYSTOLIC 10 MG tablet Take 1 tablet by mouth Daily.   • calcium carbonate (Tums Ultra 1000) 1000 MG chewable tablet    • folic acid (FOLVITE) 1 MG tablet TAKE ONE TABLET BY MOUTH EVERY DAY   • glucagon (GLUCAGON EMERGENCY) 1 MG injection Use as directed   • glucose blood test strip Mari Contour Next Test In Vitro Strip; Patient Sig: Mari Contour Next Test In Vitro Strip test 8 times daily; 240; 5; 10-Nov-2014; Active   • LANTUS 100 UNIT/ML injection 21 units subcutaneous daily   • Multiple Minerals-Vitamins (CITRACAL PLUS BONE DENSITY) tablet Take by mouth.   • raloxifene (EVISTA) 60 MG tablet Take 1 tablet by mouth Daily.   • rosuvastatin (CRESTOR) 10 MG tablet Take 1 tablet by mouth Every Night.   • SYNTHROID 75 MCG tablet Take 1 tablet by mouth Daily.   • vitamin D (ERGOCALCIFEROL) 1.25 MG (17521 UT) capsule capsule Take 1 capsule by mouth Every 7 (Seven) Days.   • vitamin E 400 UNIT capsule Take 1 capsule by mouth daily.   • omeprazole (priLOSEC) 40 MG capsule Take 1 capsule by mouth Daily for 30 days.     No current facility-administered medications on file prior to visit.      Family History   Problem Relation Age of Onset   • Depression Mother    • Glaucoma Mother    • Deep vein thrombosis Mother    •  Diabetes Father    • Glaucoma Father    • Hypertension Father    • Diabetes Sister    • Thyroid disease Sister    • Diabetes Brother    • Breast cancer Neg Hx    • Ovarian cancer Neg Hx    • Uterine cancer Neg Hx    • Colon cancer Neg Hx    • Pulmonary embolism Neg Hx      Social History     Tobacco Use   • Smoking status: Never Smoker   • Smokeless tobacco: Never Used   Substance Use Topics   • Alcohol use: No   • Drug use: No     No Known Allergies  Unable to complete visit using a video connection to the patient. A phone visit was used to complete this visits. Total time of discussion was 20 minutes.    History of Present Illness  Encounter Diagnoses   Name Primary?   • Uncontrolled type 1 diabetes mellitus with hypoglycemia and coma (CMS/HCC) Yes   • Primary hypothyroidism    • Dyslipidemia    • Insulin pump titration    • Vitamin D deficiency    • Essential hypertension    64-year-old female patient patient evaluated today for uncontrolled type 1 diabetes.  Her most recent A1c is 10.9.  She had labs drawn on March 20, 2020.  She states she is receiving a bill for her labs and wants our office to check into why she is being billed for laboratory services when she has not in the past.  She has had a cough since after Thanksgiving and has been on several rounds of steroids.  Her blood sugars have gone higher as a result of being on steroids.  She is also been on several rounds of antibiotics and cough medicine.  Her A1c is gone from 7.9-10.9.  She still will on occasion have low blood sugars.  She is mostly been having high blood sugars and having to correct.  She is interested in a continuous glucose monitoring sensor and is awaiting approval from [employer].  She will reach out to [employer] to find out the status of the sensor.  Her cough has somewhat improved.  She denies any other symptoms of coronavirus.  She does not need refills on any of her medications.    The following portions of the patient's history  were reviewed and updated as appropriate: allergies, current medications, past family history, past medical history, past social history, past surgical history and problem list.    Review of Systems   Constitutional: Negative for appetite change and fatigue.   Eyes: Negative for visual disturbance.   Respiratory: Positive for cough. Negative for shortness of breath.         Has been on steroid and cough med for chronic cough   Cardiovascular: Negative for leg swelling.   Gastrointestinal: Negative for constipation and diarrhea.   Endocrine: Negative for cold intolerance, heat intolerance, polydipsia, polyphagia and polyuria.   Genitourinary: Negative for frequency.   Neurological: Negative for numbness.       Objective   Physical Exam   Constitutional: She is oriented to person, place, and time. She appears well-developed and well-nourished. No distress.   Telephone encounter   Cardiovascular: Normal rate.   Pulmonary/Chest: Effort normal.   Musculoskeletal: She exhibits no edema.   Neurological: She is alert and oriented to person, place, and time.         Assessment/Plan   Problems Addressed this Visit        Cardiovascular and Mediastinum    HTN (hypertension)       Digestive    Vitamin D deficiency       Endocrine    Diabetes mellitus type 1, uncontrolled (CMS/[facility]) - Primary    Primary hypothyroidism       Genitourinary    Renal insufficiency       Other    Dyslipidemia    Insulin pump titration          In summary patient was evaluated.  Her A1c has gone up to 10.9.  She is having to adjust her insulin upwards as a result of the high blood sugars related to steroid use and upper respiratory infection.  She has no fever.  Her cough is slowly improving.  She did have a sore throat which is also improved.  She has been on several rounds of steroids.  She will check with [employer] regarding a sensor in addition to her insulin pump.  Her thyroid labs are in satisfactory range.  Cholesterol and vitamin D are stable.   Her kidney function is stable.  She is to follow-up every 3 months with labs prior.  She has been encouraged to reach out to the office should she have any questions or concerns.

## 2020-05-28 RX ORDER — BUPROPION HYDROCHLORIDE 300 MG/1
300 TABLET ORAL EVERY MORNING
Qty: 90 TABLET | Refills: 0 | Status: SHIPPED | OUTPATIENT
Start: 2020-05-28 | End: 2020-08-26

## 2020-06-24 LAB
25(OH)D3+25(OH)D2 SERPL-MCNC: 48.5 NG/ML (ref 30–100)
ALBUMIN SERPL-MCNC: 4 G/DL (ref 3.5–5.2)
ALBUMIN/GLOB SERPL: 1.7 G/DL
ALP SERPL-CCNC: 77 U/L (ref 39–117)
ALT SERPL-CCNC: 10 U/L (ref 1–33)
AST SERPL-CCNC: 16 U/L (ref 1–32)
BILIRUB SERPL-MCNC: 0.4 MG/DL (ref 0.2–1.2)
BUN SERPL-MCNC: 16 MG/DL (ref 8–23)
BUN/CREAT SERPL: 9.8 (ref 7–25)
C PEPTIDE SERPL-MCNC: <0.1 NG/ML (ref 1.1–4.4)
CALCIUM SERPL-MCNC: 9.1 MG/DL (ref 8.6–10.5)
CHLORIDE SERPL-SCNC: 103 MMOL/L (ref 98–107)
CHOLEST SERPL-MCNC: 136 MG/DL (ref 0–200)
CO2 SERPL-SCNC: 26.4 MMOL/L (ref 22–29)
CREAT SERPL-MCNC: 1.64 MG/DL (ref 0.57–1)
GAD65 AB SER IA-ACNC: <5 U/ML (ref 0–5)
GLOBULIN SER CALC-MCNC: 2.4 GM/DL
GLUCOSE SERPL-MCNC: 225 MG/DL (ref 65–99)
HBA1C MFR BLD: 8 % (ref 4.8–5.6)
HDLC SERPL-MCNC: 61 MG/DL (ref 40–60)
INTERPRETATION: NORMAL
LDLC SERPL CALC-MCNC: 52 MG/DL (ref 0–100)
Lab: NORMAL
MICROALBUMIN UR-MCNC: 13.6 UG/ML
POTASSIUM SERPL-SCNC: 4.5 MMOL/L (ref 3.5–5.2)
PROT SERPL-MCNC: 6.4 G/DL (ref 6–8.5)
SODIUM SERPL-SCNC: 138 MMOL/L (ref 136–145)
T3FREE SERPL-MCNC: 2.8 PG/ML (ref 2–4.4)
T4 FREE SERPL-MCNC: 1.43 NG/DL (ref 0.93–1.7)
T4 SERPL-MCNC: 7.27 MCG/DL (ref 4.5–11.7)
TRIGL SERPL-MCNC: 113 MG/DL (ref 0–150)
TSH SERPL DL<=0.005 MIU/L-ACNC: 1.33 UIU/ML (ref 0.27–4.2)
URATE SERPL-MCNC: 4.6 MG/DL (ref 2.4–5.7)
VLDLC SERPL CALC-MCNC: 22.6 MG/DL

## 2020-06-25 RX ORDER — FOLIC ACID 1 MG/1
TABLET ORAL
Qty: 90 TABLET | Refills: 0 | Status: SHIPPED | OUTPATIENT
Start: 2020-06-25 | End: 2020-09-24 | Stop reason: SDUPTHER

## 2020-07-02 ENCOUNTER — OFFICE VISIT (OUTPATIENT)
Dept: ENDOCRINOLOGY | Age: 65
End: 2020-07-02

## 2020-07-02 VITALS
SYSTOLIC BLOOD PRESSURE: 120 MMHG | DIASTOLIC BLOOD PRESSURE: 70 MMHG | RESPIRATION RATE: 16 BRPM | WEIGHT: 163.8 LBS | BODY MASS INDEX: 30.93 KG/M2 | HEIGHT: 61 IN

## 2020-07-02 DIAGNOSIS — R53.83 FATIGUE DUE TO DEPRESSION: ICD-10-CM

## 2020-07-02 DIAGNOSIS — Z78.0 MENOPAUSE: ICD-10-CM

## 2020-07-02 DIAGNOSIS — M81.0 OSTEOPOROSIS, UNSPECIFIED OSTEOPOROSIS TYPE, UNSPECIFIED PATHOLOGICAL FRACTURE PRESENCE: ICD-10-CM

## 2020-07-02 DIAGNOSIS — E10.641 UNCONTROLLED TYPE 1 DIABETES MELLITUS WITH HYPOGLYCEMIA AND COMA (HCC): Primary | ICD-10-CM

## 2020-07-02 DIAGNOSIS — E03.9 PRIMARY HYPOTHYROIDISM: ICD-10-CM

## 2020-07-02 DIAGNOSIS — E78.5 DYSLIPIDEMIA: ICD-10-CM

## 2020-07-02 DIAGNOSIS — E55.9 VITAMIN D DEFICIENCY: ICD-10-CM

## 2020-07-02 DIAGNOSIS — F32.A FATIGUE DUE TO DEPRESSION: ICD-10-CM

## 2020-07-02 DIAGNOSIS — I10 ESSENTIAL HYPERTENSION: ICD-10-CM

## 2020-07-02 DIAGNOSIS — Z46.81 INSULIN PUMP TITRATION: ICD-10-CM

## 2020-07-02 PROCEDURE — 99214 OFFICE O/P EST MOD 30 MIN: CPT | Performed by: INTERNAL MEDICINE

## 2020-07-02 RX ORDER — ERGOCALCIFEROL 1.25 MG/1
50000 CAPSULE ORAL
Qty: 13 CAPSULE | Refills: 3 | Status: SHIPPED | OUTPATIENT
Start: 2020-07-02 | End: 2021-07-12

## 2020-07-02 RX ORDER — INSULIN GLULISINE 100 [IU]/ML
INJECTION, SOLUTION SUBCUTANEOUS
Qty: 90 ML | Refills: 3 | Status: SHIPPED | OUTPATIENT
Start: 2020-07-02 | End: 2021-07-06

## 2020-07-02 RX ORDER — LEVOTHYROXINE SODIUM 75 MCG
75 TABLET ORAL DAILY
Qty: 90 TABLET | Refills: 3 | Status: SHIPPED | OUTPATIENT
Start: 2020-07-02 | End: 2021-04-09

## 2020-07-02 RX ORDER — ROSUVASTATIN CALCIUM 10 MG/1
10 TABLET, COATED ORAL NIGHTLY
Qty: 90 TABLET | Refills: 3 | Status: SHIPPED | OUTPATIENT
Start: 2020-07-02 | End: 2021-07-26

## 2020-07-02 RX ORDER — NEBIVOLOL HYDROCHLORIDE 10 MG/1
10 TABLET ORAL DAILY
Qty: 90 TABLET | Refills: 3 | Status: SHIPPED | OUTPATIENT
Start: 2020-07-02

## 2020-07-02 NOTE — PROGRESS NOTES
"Subjective   Dayanna Lewis is a 64 y.o. female seen for follow up for DM1, hypothyroidism, vit d deficiency, lab review. Patient states that she is taking an OTC medication for acid reflux but does not know the name. She denies any problems or concerns. She is currently using a [employer] insulin pump and changing pump sites every 3 days. She has been working with her insurance to get approval for the Guardian sensor. She is checking BG 8 times a day. She states that has had hypoglycemic episodes at night which is why she wants the sensor.     History of Present Illness is a 64-year-old female known patient with type 1 diabetes hypertension and dyslipidemia as well as hypothyroidism and vitamin D deficiency as well as osteoporosis and chronic fatigue.  Over the course of last 6 months she has had no significant health problem for which to go to the emergency room or hospital.  She is checking her blood glucose up to 8 times daily and has had occasional hypoglycemic episodes.    /70   Resp 16   Ht 154.9 cm (61\")   Wt 74.3 kg (163 lb 12.8 oz)   LMP  (LMP Unknown)   BMI 30.95 kg/m²      No Known Allergies    Current Outpatient Medications:   •  acetone, urine, test strip, Use as directed, Disp: 50 each, Rfl: 1  •  APIDRA 100 UNIT/ML injection, INJECT 100 UNITS UNDER THE SKIN EVERY DAY, Disp: 90 mL, Rfl: 3  •  aspirin 81 MG tablet, Take 1 tablet by mouth daily., Disp: , Rfl:   •  buPROPion XL (WELLBUTRIN XL) 300 MG 24 hr tablet, TAKE 1 TABLET BY MOUTH EVERY MORNING, Disp: 90 tablet, Rfl: 0  •  BYSTOLIC 10 MG tablet, Take 1 tablet by mouth Daily., Disp: 90 tablet, Rfl: 3  •  calcium carbonate (Tums Ultra 1000) 1000 MG chewable tablet, , Disp: , Rfl:   •  folic acid (FOLVITE) 1 MG tablet, TAKE 1 TABLET BY MOUTH EVERY DAY, Disp: 90 tablet, Rfl: 0  •  glucagon (GLUCAGON EMERGENCY) 1 MG injection, Use as directed, Disp: 2 kit, Rfl: 5  •  glucose blood test strip, Mari Contour Next Test In Vitro Strip; Patient Sig: " Mari Contour Next Test In Vitro Strip test 8 times daily; 240; 5; 10-Nov-2014; Active, Disp: , Rfl:   •  LANTUS 100 UNIT/ML injection, 21 units subcutaneous daily, Disp: 10 mL, Rfl: 5  •  Multiple Minerals-Vitamins (CITRACAL PLUS BONE DENSITY) tablet, Take by mouth., Disp: , Rfl:   •  raloxifene (EVISTA) 60 MG tablet, Take 1 tablet by mouth Daily., Disp: 90 tablet, Rfl: 3  •  rosuvastatin (CRESTOR) 10 MG tablet, Take 1 tablet by mouth Every Night., Disp: 90 tablet, Rfl: 3  •  SYNTHROID 75 MCG tablet, Take 1 tablet by mouth Daily., Disp: 90 tablet, Rfl: 3  •  vitamin D (ERGOCALCIFEROL) 1.25 MG (89742 UT) capsule capsule, Take 1 capsule by mouth Every 7 (Seven) Days., Disp: 13 capsule, Rfl: 3  •  vitamin E 400 UNIT capsule, Take 1 capsule by mouth daily., Disp: , Rfl:       The following portions of the patient's history were reviewed and updated as appropriate: allergies, current medications, past family history, past medical history, past social history, past surgical history and problem list.    Review of Systems   Constitutional: Negative.    HENT: Negative.    Eyes: Negative.    Respiratory: Negative.    Cardiovascular: Negative.    Gastrointestinal: Negative.    Endocrine: Negative.    Genitourinary: Negative.    Musculoskeletal: Negative.    Skin: Negative.    Allergic/Immunologic: Negative.    Neurological: Negative.    Hematological: Negative.    Psychiatric/Behavioral: Negative.    The above review of system was reviewed, corroborated and accepted.    Objective   Physical Exam   Constitutional: She is oriented to person, place, and time. She appears well-developed and well-nourished. No distress.   HENT:   Head: Normocephalic and atraumatic.   Right Ear: External ear normal.   Left Ear: External ear normal.   Nose: Nose normal.   Mouth/Throat: Oropharynx is clear and moist. No oropharyngeal exudate.   Eyes: Pupils are equal, round, and reactive to light. Conjunctivae and EOM are normal. Right eye exhibits  no discharge. Left eye exhibits no discharge. No scleral icterus.   Neck: Normal range of motion. Neck supple. No JVD present. No tracheal deviation present. No thyromegaly present.   Cardiovascular: Normal rate, regular rhythm, normal heart sounds and intact distal pulses. Exam reveals no gallop and no friction rub.   No murmur heard.  Pulmonary/Chest: Effort normal and breath sounds normal. No stridor. No respiratory distress. She has no wheezes. She has no rales. She exhibits no tenderness.   Abdominal: Soft. Bowel sounds are normal. She exhibits no distension and no mass. There is no tenderness. There is no rebound and no guarding. No hernia.   Musculoskeletal: Normal range of motion. She exhibits no edema, tenderness or deformity.   Lymphadenopathy:     She has no cervical adenopathy.   Neurological: She is alert and oriented to person, place, and time. She has normal reflexes. She displays normal reflexes. No cranial nerve deficit or sensory deficit. She exhibits normal muscle tone. Coordination normal.   Skin: Skin is warm and dry. No rash noted. She is not diaphoretic. No erythema. No pallor.   Psychiatric: She has a normal mood and affect. Her behavior is normal. Judgment and thought content normal.   Nursing note and vitals reviewed.  No significant change since 1/3/2020 office visit.    Results for orders placed or performed in visit on 06/18/20   Comprehensive Metabolic Panel   Result Value Ref Range    Glucose 225 (H) 65 - 99 mg/dL    BUN 16 8 - 23 mg/dL    Creatinine 1.64 (H) 0.57 - 1.00 mg/dL    eGFR Non African Am 32 (L) >60 mL/min/1.73    eGFR  Am 38 (L) >60 mL/min/1.73    BUN/Creatinine Ratio 9.8 7.0 - 25.0    Sodium 138 136 - 145 mmol/L    Potassium 4.5 3.5 - 5.2 mmol/L    Chloride 103 98 - 107 mmol/L    Total CO2 26.4 22.0 - 29.0 mmol/L    Calcium 9.1 8.6 - 10.5 mg/dL    Total Protein 6.4 6.0 - 8.5 g/dL    Albumin 4.00 3.50 - 5.20 g/dL    Globulin 2.4 gm/dL    A/G Ratio 1.7 g/dL    Total  Bilirubin 0.4 0.2 - 1.2 mg/dL    Alkaline Phosphatase 77 39 - 117 U/L    AST (SGOT) 16 1 - 32 U/L    ALT (SGPT) 10 1 - 33 U/L   Lipid Panel   Result Value Ref Range    Total Cholesterol 136 0 - 200 mg/dL    Triglycerides 113 0 - 150 mg/dL    HDL Cholesterol 61 (H) 40 - 60 mg/dL    VLDL Cholesterol 22.6 mg/dL    LDL Cholesterol  52 0 - 100 mg/dL   Cardiovascular Risk Assessment   Result Value Ref Range    Interpretation Note    T4 & TSH ([facility])   Result Value Ref Range    TSH 1.330 0.270 - 4.200 uIU/mL    T4, Total 7.27 4.50 - 11.70 mcg/dL   Diabetes Patient Education   Result Value Ref Range    PDF Image Not applicable    Hemoglobin A1c   Result Value Ref Range    Hemoglobin A1C 8.00 (H) 4.80 - 5.60 %   T4, Free   Result Value Ref Range    Free T4 1.43 0.93 - 1.70 ng/dL   C-Peptide   Result Value Ref Range    C-Peptide <0.1 (L) 1.1 - 4.4 ng/mL   Vitamin D 25 Hydroxy   Result Value Ref Range    25 Hydroxy, Vitamin D 48.5 30.0 - 100.0 ng/ml   Glutamic Acid Decarboxylase   Result Value Ref Range    SUSAN-65 <5.0 0.0 - 5.0 U/mL   MicroAlbumin, Urine, Random -   Result Value Ref Range    Microalbumin, Urine 13.6 Not Estab. ug/mL   Uric Acid   Result Value Ref Range    Uric Acid 4.6 2.4 - 5.7 mg/dL   T3, Free   Result Value Ref Range    T3, Free 2.8 2.0 - 4.4 pg/mL         Assessment/Plan   Dayanna was seen today for diabetes.    Diagnoses and all orders for this visit:    Uncontrolled type 1 diabetes mellitus with hypoglycemia and coma (CMS/[facility])  -     T4 & TSH ([facility]); Future  -     T3, Free; Future  -     T4, Free; Future  -     Thyroglobulin With Anti-TG; Future  -     Uric Acid; Future  -     Vitamin D 25 Hydroxy; Future  -     Comprehensive Metabolic Panel; Future  -     Hemoglobin A1c; Future  -     Lipid Panel; Future  -     MicroAlbumin, Urine, Random - Urine, Clean Catch; Future  -     (LAG, COR and PAD ONLY) CELLEX COVID 19 ANTIBODIES, IGG AND IGM; Future    Essential hypertension  -     T4 & TSH ([facility]);  Future  -     T3, Free; Future  -     T4, Free; Future  -     Thyroglobulin With Anti-TG; Future  -     Uric Acid; Future  -     Vitamin D 25 Hydroxy; Future  -     Comprehensive Metabolic Panel; Future  -     Hemoglobin A1c; Future  -     Lipid Panel; Future  -     MicroAlbumin, Urine, Random - Urine, Clean Catch; Future  -     (LAG, COR and PAD ONLY) CELLEX COVID 19 ANTIBODIES, IGG AND IGM; Future    Vitamin D deficiency  -     T4 & TSH ([facility]); Future  -     T3, Free; Future  -     T4, Free; Future  -     Thyroglobulin With Anti-TG; Future  -     Uric Acid; Future  -     Vitamin D 25 Hydroxy; Future  -     Comprehensive Metabolic Panel; Future  -     Hemoglobin A1c; Future  -     Lipid Panel; Future  -     MicroAlbumin, Urine, Random - Urine, Clean Catch; Future  -     (LAG, COR and PAD ONLY) CELLEX COVID 19 ANTIBODIES, IGG AND IGM; Future    Primary hypothyroidism  -     T4 & TSH ([facility]); Future  -     T3, Free; Future  -     T4, Free; Future  -     Thyroglobulin With Anti-TG; Future  -     Uric Acid; Future  -     Vitamin D 25 Hydroxy; Future  -     Comprehensive Metabolic Panel; Future  -     Hemoglobin A1c; Future  -     Lipid Panel; Future  -     MicroAlbumin, Urine, Random - Urine, Clean Catch; Future  -     (LAG, COR and PAD ONLY) CELLEX COVID 19 ANTIBODIES, IGG AND IGM; Future    Osteoporosis, unspecified osteoporosis type, unspecified pathological fracture presence  -     T4 & TSH ([facility]); Future  -     T3, Free; Future  -     T4, Free; Future  -     Thyroglobulin With Anti-TG; Future  -     Uric Acid; Future  -     Vitamin D 25 Hydroxy; Future  -     Comprehensive Metabolic Panel; Future  -     Hemoglobin A1c; Future  -     Lipid Panel; Future  -     MicroAlbumin, Urine, Random - Urine, Clean Catch; Future  -     (LAG, COR and PAD ONLY) CELLEX COVID 19 ANTIBODIES, IGG AND IGM; Future    Menopause  -     T4 & TSH ([facility]); Future  -     T3, Free; Future  -     T4, Free; Future  -     Thyroglobulin  With Anti-TG; Future  -     Uric Acid; Future  -     Vitamin D 25 Hydroxy; Future  -     Comprehensive Metabolic Panel; Future  -     Hemoglobin A1c; Future  -     Lipid Panel; Future  -     MicroAlbumin, Urine, Random - Urine, Clean Catch; Future  -     (LAG, COR and PAD ONLY) CELLEX COVID 19 ANTIBODIES, IGG AND IGM; Future    Dyslipidemia  -     T4 & TSH ([facility]); Future  -     T3, Free; Future  -     T4, Free; Future  -     Thyroglobulin With Anti-TG; Future  -     Uric Acid; Future  -     Vitamin D 25 Hydroxy; Future  -     Comprehensive Metabolic Panel; Future  -     Hemoglobin A1c; Future  -     Lipid Panel; Future  -     MicroAlbumin, Urine, Random - Urine, Clean Catch; Future  -     (LAG, COR and PAD ONLY) CELLEX COVID 19 ANTIBODIES, IGG AND IGM; Future    Fatigue due to depression  -     T4 & TSH ([facility]); Future  -     T3, Free; Future  -     T4, Free; Future  -     Thyroglobulin With Anti-TG; Future  -     Uric Acid; Future  -     Vitamin D 25 Hydroxy; Future  -     Comprehensive Metabolic Panel; Future  -     Hemoglobin A1c; Future  -     Lipid Panel; Future  -     MicroAlbumin, Urine, Random - Urine, Clean Catch; Future  -     (LAG, COR and PAD ONLY) CELLEX COVID 19 ANTIBODIES, IGG AND IGM; Future    Insulin pump titration  -     T4 & TSH ([facility]); Future  -     T3, Free; Future  -     T4, Free; Future  -     Thyroglobulin With Anti-TG; Future  -     Uric Acid; Future  -     Vitamin D 25 Hydroxy; Future  -     Comprehensive Metabolic Panel; Future  -     Hemoglobin A1c; Future  -     Lipid Panel; Future  -     MicroAlbumin, Urine, Random - Urine, Clean Catch; Future  -     (LAG, COR and PAD ONLY) CELLEX COVID 19 ANTIBODIES, IGG AND IGM; Future    Other orders  -     APIDRA 100 UNIT/ML injection; INJECT 100 UNITS UNDER THE SKIN EVERY DAY  -     BYSTOLIC 10 MG tablet; Take 1 tablet by mouth Daily.  -     rosuvastatin (CRESTOR) 10 MG tablet; Take 1 tablet by mouth Every Night.  -     SYNTHROID 75 MCG  tablet; Take 1 tablet by mouth Daily.  -     vitamin D (ERGOCALCIFEROL) 1.25 MG (70134 UT) capsule capsule; Take 1 capsule by mouth Every 7 (Seven) Days.      In summary I saw and examined this 64-year-old female for above-mentioned problems.  I reviewed her laboratory evaluation of 6/18/2020 and provided her with a hard copy of it.  Overall she is clinically and metabolically stable and therefore we will go ahead and continue her current prescriptions.  We will also request a guardian sensor to protect her against hypoglycemic attacks that she may not be aware of.  We will be waiting for the approval for her guardian sensor.  She will see Ms. Pamelaannabella Najera in 3 months or sooner if needed but laboratory evaluation prior to each office visit.

## 2020-07-23 ENCOUNTER — TELEPHONE (OUTPATIENT)
Dept: ENDOCRINOLOGY | Age: 65
End: 2020-07-23

## 2020-07-23 DIAGNOSIS — E10.641 UNCONTROLLED TYPE 1 DIABETES MELLITUS WITH HYPOGLYCEMIA AND COMA (HCC): Primary | ICD-10-CM

## 2020-07-23 NOTE — TELEPHONE ENCOUNTER
"\"I am not sure who to get an appointment with. I have received my Dexcom G6 and need to see someone on how to use it. Thanks\" Pt sent this over [facility]. Currently, I do not see any openings with either provider sooner than her appointment. What should I do?  "

## 2020-07-29 DIAGNOSIS — E10.641 UNCONTROLLED TYPE 1 DIABETES MELLITUS WITH HYPOGLYCEMIA AND COMA (HCC): Primary | ICD-10-CM

## 2020-08-03 DIAGNOSIS — E10.641 UNCONTROLLED TYPE 1 DIABETES MELLITUS WITH HYPOGLYCEMIA AND COMA (HCC): Primary | ICD-10-CM

## 2020-08-03 DIAGNOSIS — E08.00 DIABETES MELLITUS DUE TO UNDERLYING CONDITION WITH HYPEROSMOLARITY WITHOUT COMA, WITHOUT LONG-TERM CURRENT USE OF INSULIN (HCC): ICD-10-CM

## 2020-08-26 RX ORDER — BUPROPION HYDROCHLORIDE 300 MG/1
300 TABLET ORAL EVERY MORNING
Qty: 90 TABLET | Refills: 0 | Status: SHIPPED | OUTPATIENT
Start: 2020-08-26 | End: 2020-11-24

## 2020-08-28 ENCOUNTER — APPOINTMENT (OUTPATIENT)
Dept: WOMENS IMAGING | Facility: HOSPITAL | Age: 65
End: 2020-08-28

## 2020-08-28 PROCEDURE — 77067 SCR MAMMO BI INCL CAD: CPT | Performed by: RADIOLOGY

## 2020-08-28 PROCEDURE — 77063 BREAST TOMOSYNTHESIS BI: CPT | Performed by: RADIOLOGY

## 2020-09-24 RX ORDER — FOLIC ACID 1 MG/1
1000 TABLET ORAL DAILY
Qty: 90 TABLET | Refills: 0 | Status: SHIPPED | OUTPATIENT
Start: 2020-09-24 | End: 2020-12-19

## 2020-09-25 ENCOUNTER — RESULTS ENCOUNTER (OUTPATIENT)
Dept: ENDOCRINOLOGY | Age: 65
End: 2020-09-25

## 2020-09-25 DIAGNOSIS — Z78.0 MENOPAUSE: ICD-10-CM

## 2020-09-25 DIAGNOSIS — E03.9 PRIMARY HYPOTHYROIDISM: ICD-10-CM

## 2020-09-25 DIAGNOSIS — I10 ESSENTIAL HYPERTENSION: ICD-10-CM

## 2020-09-25 DIAGNOSIS — R53.83 FATIGUE DUE TO DEPRESSION: ICD-10-CM

## 2020-09-25 DIAGNOSIS — Z46.81 INSULIN PUMP TITRATION: ICD-10-CM

## 2020-09-25 DIAGNOSIS — F32.A FATIGUE DUE TO DEPRESSION: ICD-10-CM

## 2020-09-25 DIAGNOSIS — M81.0 OSTEOPOROSIS, UNSPECIFIED OSTEOPOROSIS TYPE, UNSPECIFIED PATHOLOGICAL FRACTURE PRESENCE: ICD-10-CM

## 2020-09-25 DIAGNOSIS — E10.641 UNCONTROLLED TYPE 1 DIABETES MELLITUS WITH HYPOGLYCEMIA AND COMA (HCC): ICD-10-CM

## 2020-09-25 DIAGNOSIS — E55.9 VITAMIN D DEFICIENCY: ICD-10-CM

## 2020-09-25 DIAGNOSIS — E78.5 DYSLIPIDEMIA: ICD-10-CM

## 2020-10-26 ENCOUNTER — LAB (OUTPATIENT)
Dept: ENDOCRINOLOGY | Age: 65
End: 2020-10-26

## 2020-10-26 DIAGNOSIS — F32.A FATIGUE DUE TO DEPRESSION: ICD-10-CM

## 2020-10-26 DIAGNOSIS — I10 ESSENTIAL HYPERTENSION: Primary | ICD-10-CM

## 2020-10-26 DIAGNOSIS — R53.83 FATIGUE DUE TO DEPRESSION: ICD-10-CM

## 2020-10-26 DIAGNOSIS — E10.641 UNCONTROLLED TYPE 1 DIABETES MELLITUS WITH HYPOGLYCEMIA AND COMA (HCC): ICD-10-CM

## 2020-10-26 DIAGNOSIS — E55.9 VITAMIN D DEFICIENCY: ICD-10-CM

## 2020-10-26 DIAGNOSIS — E16.2 HYPOGLYCEMIA: ICD-10-CM

## 2020-10-26 DIAGNOSIS — E03.9 PRIMARY HYPOTHYROIDISM: ICD-10-CM

## 2020-11-01 LAB
25(OH)D3+25(OH)D2 SERPL-MCNC: 53 NG/ML (ref 30–100)
ALBUMIN SERPL-MCNC: 4 G/DL (ref 3.5–5.2)
ALBUMIN/GLOB SERPL: 2 G/DL
ALP SERPL-CCNC: 89 U/L (ref 39–117)
ALT SERPL-CCNC: 12 U/L (ref 1–33)
AST SERPL-CCNC: 13 U/L (ref 1–32)
BILIRUB SERPL-MCNC: 0.2 MG/DL (ref 0–1.2)
BUN SERPL-MCNC: 18 MG/DL (ref 8–23)
BUN/CREAT SERPL: 12.8 (ref 7–25)
C PEPTIDE SERPL-MCNC: <0.1 NG/ML (ref 1.1–4.4)
CALCIUM SERPL-MCNC: 9.3 MG/DL (ref 8.6–10.5)
CHLORIDE SERPL-SCNC: 109 MMOL/L (ref 98–107)
CHOLEST SERPL-MCNC: 150 MG/DL (ref 0–200)
CO2 SERPL-SCNC: 27.1 MMOL/L (ref 22–29)
CREAT SERPL-MCNC: 1.41 MG/DL (ref 0.57–1)
FT4I SERPL CALC-MCNC: 1.8 (ref 1.2–4.9)
GLOBULIN SER CALC-MCNC: 2 GM/DL
GLUCOSE SERPL-MCNC: 85 MG/DL (ref 65–99)
HBA1C MFR BLD: 7.5 % (ref 4.8–5.6)
HDLC SERPL-MCNC: 59 MG/DL (ref 40–60)
INTERPRETATION: NORMAL
LDLC SERPL CALC-MCNC: 74 MG/DL (ref 0–100)
Lab: NORMAL
MICROALBUMIN UR-MCNC: 6.7 UG/ML
POTASSIUM SERPL-SCNC: 4.3 MMOL/L (ref 3.5–5.2)
PROT SERPL-MCNC: 6 G/DL (ref 6–8.5)
SODIUM SERPL-SCNC: 145 MMOL/L (ref 136–145)
T3FREE SERPL-MCNC: 2.4 PG/ML (ref 2–4.4)
T3RU NFR SERPL: 28 % (ref 24–39)
T4 FREE SERPL-MCNC: 1.35 NG/DL (ref 0.93–1.7)
T4 SERPL-MCNC: 6.6 UG/DL (ref 4.5–12)
THYROGLOB SERPL-MCNC: 6.7 NG/ML
TRIGL SERPL-MCNC: 90 MG/DL (ref 0–150)
TSH SERPL DL<=0.005 MIU/L-ACNC: 1.46 UIU/ML (ref 0.45–4.5)
VLDLC SERPL CALC-MCNC: 17 MG/DL (ref 5–40)

## 2020-11-11 ENCOUNTER — OFFICE VISIT (OUTPATIENT)
Dept: ENDOCRINOLOGY | Age: 65
End: 2020-11-11

## 2020-11-11 DIAGNOSIS — I10 ESSENTIAL HYPERTENSION: ICD-10-CM

## 2020-11-11 DIAGNOSIS — E10.641 UNCONTROLLED TYPE 1 DIABETES MELLITUS WITH HYPOGLYCEMIA AND COMA (HCC): Primary | ICD-10-CM

## 2020-11-11 DIAGNOSIS — E03.9 PRIMARY HYPOTHYROIDISM: ICD-10-CM

## 2020-11-11 DIAGNOSIS — E78.5 DYSLIPIDEMIA: ICD-10-CM

## 2020-11-11 DIAGNOSIS — E55.9 VITAMIN D DEFICIENCY: ICD-10-CM

## 2020-11-11 PROCEDURE — 99443 PR PHYS/QHP TELEPHONE EVALUATION 21-30 MIN: CPT | Performed by: NURSE PRACTITIONER

## 2020-11-11 NOTE — PROGRESS NOTES
Subjective    Dayanna Lewis is a 65 y.o. female. she is here today for follow-up.  Chief Complaint   Patient presents with   • Diabetes   • Vitamin D Deficiency   • Hyperlipidemia     You have chosen to receive care through a telephone visit. Do you consent to use a telephone visit for your medical care today? Yes  Total time 25 min      History of Present Illness   Encounter Diagnoses   Name Primary?   • Uncontrolled type 1 diabetes mellitus with hypoglycemia and coma (CMS/HCC) Yes   • Dyslipidemia    • Essential hypertension    • Primary hypothyroidism    • Vitamin D deficiency    This is a 55-year-old female patient evaluated today via telemedicine for the above diagnoses.  She is on a [employer] insulin pump.  She was recently started on the Dexcom G6.  She has had some problems with low blood sugars in the 60 range at 4 AM.  She states her Dexcom is waking her up.  She has not familiar with how to change her basal settings and I will have reached out to Adela with [employer] to contact her.  She is exercising Monday through Friday approximately 60 minutes a day.  She is doing 30 minutes of walking and the other 30 minutes cardio.  Her medication list was reviewed and updated.  She denies needing any refills currently.  She feels that the sensor has helped her to be more proactive and managing her blood sugars.  Her A1c according to her recent labs has improved to 7.5.  She has an appointment to see her primary care provider next week.  She is complaining of some issues with vertigo and will address that with her primary care provider.      The following portions of the patient's history were reviewed and updated as appropriate:   Past Medical History:   Diagnosis Date   • Depression    • Dermatomyositis (CMS/HCC)     TYPE 2   • Diabetes mellitus type 1, uncontrolled (CMS/HCC)    • Disease of thyroid gland    • Dyslipidemia    • Hyperlipidemia    • Hypertension    • Hypothyroidism    • Menopausal disorder    •  Osteoporosis    • Polymyositis (CMS/HCC)    • Vitamin D deficiency      Past Surgical History:   Procedure Laterality Date   •  SECTION     • INNER EAR SURGERY     • KNEE SURGERY     • OTHER SURGICAL HISTORY      EAR SURGERY     Family History   Problem Relation Age of Onset   • Depression Mother    • Glaucoma Mother    • Deep vein thrombosis Mother    • Diabetes Father    • Glaucoma Father    • Hypertension Father    • Diabetes Sister    • Thyroid disease Sister    • Diabetes Brother    • Breast cancer Neg Hx    • Ovarian cancer Neg Hx    • Uterine cancer Neg Hx    • Colon cancer Neg Hx    • Pulmonary embolism Neg Hx      OB History        2    Para   2    Term                AB        Living   2       SAB        TAB        Ectopic        Molar        Multiple        Live Births                  Current Outpatient Medications   Medication Sig Dispense Refill   • acetone, urine, test strip Use as directed 50 each 1   • APIDRA 100 UNIT/ML injection INJECT 100 UNITS UNDER THE SKIN EVERY DAY 90 mL 3   • aspirin 81 MG tablet Take 1 tablet by mouth daily.     • buPROPion XL (WELLBUTRIN XL) 300 MG 24 hr tablet TAKE 1 TABLET BY MOUTH EVERY MORNING 90 tablet 0   • BYSTOLIC 10 MG tablet Take 1 tablet by mouth Daily. 90 tablet 3   • calcium carbonate (Tums Ultra 1000) 1000 MG chewable tablet      • folic acid (FOLVITE) 1 MG tablet Take 1 tablet by mouth Daily. 90 tablet 0   • glucagon (GLUCAGON EMERGENCY) 1 MG injection Use as directed 2 kit 5   • glucose blood test strip Mari Contour Next Test In Vitro Strip; Patient Sig: Mari Contour Next Test In Vitro Strip test 8 times daily; 240; 5; 10-Nov-2014; Active     • LANTUS 100 UNIT/ML injection 21 units subcutaneous daily 10 mL 5   • Multiple Minerals-Vitamins (CITRACAL PLUS BONE DENSITY) tablet Take by mouth.     • raloxifene (EVISTA) 60 MG tablet Take 1 tablet by mouth Daily. 90 tablet 3   • rosuvastatin (CRESTOR) 10 MG tablet Take 1 tablet by mouth  Every Night. 90 tablet 3   • SYNTHROID 75 MCG tablet Take 1 tablet by mouth Daily. 90 tablet 3   • vitamin D (ERGOCALCIFEROL) 1.25 MG (14530 UT) capsule capsule Take 1 capsule by mouth Every 7 (Seven) Days. 13 capsule 3   • vitamin E 400 UNIT capsule Take 1 capsule by mouth daily.       No current facility-administered medications for this visit.      No Known Allergies  Social History     Socioeconomic History   • Marital status:      Spouse name: Not on file   • Number of children: Not on file   • Years of education: Not on file   • Highest education level: Not on file   Tobacco Use   • Smoking status: Never Smoker   • Smokeless tobacco: Never Used   Substance and Sexual Activity   • Alcohol use: No   • Drug use: No   • Sexual activity: Not Currently     Partners: Male     Birth control/protection: Post-menopausal       Review of Systems  Review of Systems   Constitutional: Negative for appetite change and fatigue.   Eyes: Negative for visual disturbance.   Respiratory: Negative for cough.    Cardiovascular: Negative for leg swelling.   Gastrointestinal: Negative for constipation and diarrhea.   Endocrine: Negative for cold intolerance, heat intolerance, polydipsia, polyphagia and polyuria.   Genitourinary: Negative for frequency.   Neurological: Negative for numbness.        Objective    There were no vitals filed for this visit.    Physical Exam  Constitutional:       General: She is not in acute distress.     Appearance: She is well-developed.      Comments:  pleasant, no distress   Pulmonary:      Effort: Pulmonary effort is normal. No respiratory distress.   Neurological:      Mental Status: She is alert and oriented to person, place, and time.   Psychiatric:         Thought Content: Thought content normal.         Lab Review  Sodium (mmol/L)   Date Value   10/26/2020 145   06/18/2020 138   03/20/2020 139     Potassium (mmol/L)   Date Value   10/26/2020 4.3   06/18/2020 4.5   03/20/2020 4.8      Chloride (mmol/L)   Date Value   10/26/2020 109 (H)   06/18/2020 103   03/20/2020 103     Total CO2 (mmol/L)   Date Value   10/26/2020 27.1   06/18/2020 26.4   03/20/2020 26.4     BUN (mg/dL)   Date Value   10/26/2020 18   06/18/2020 16   03/20/2020 13     Creatinine (mg/dL)   Date Value   10/26/2020 1.41 (H)   06/18/2020 1.64 (H)   03/20/2020 1.45 (H)     Hemoglobin A1C (%)   Date Value   10/26/2020 7.50 (H)   06/18/2020 8.00 (H)   03/20/2020 10.90 (H)     Triglycerides (mg/dL)   Date Value   10/26/2020 90   06/18/2020 113   03/20/2020 120     LDL Cholesterol  (mg/dL)   Date Value   06/18/2020 52   03/20/2020 57   12/20/2019 56     LDL Chol Calc (NIH) (mg/dL)   Date Value   10/26/2020 74       Assessment/Plan      1. Uncontrolled type 1 diabetes mellitus with hypoglycemia and coma (CMS/[facility])    2. Dyslipidemia    3. Essential hypertension    4. Primary hypothyroidism    5. Vitamin D deficiency    .  In summary patient was evaluated via telemedicine.  Her labs were reviewed and she has access to them on my chart.  She is taking all her medications as prescribed.  Have reached out to [place] to follow-up with her regarding her basal settings throughout the night since she is having some reported hypoglycemic events at 4 AM.  Her lowest blood sugar has been in the 60 range.  She denies any weight changes.  She is exercising 5 days weekly.  She is currently on sensor but is unfamiliar with how to upload the information so that we can remotely look at her blood sugar readings.  No medications have been added or changed at today's visit.  She is to follow-up in 3 months.  I reached out to staff to contact her to schedule her next appointments out every 3 months.  She has been encouraged to contact the office should she have any questions or concerns prior to her next visit.  She has been advised to go to the emergency room or discussed with her family practice doctor her issues with vertigo.  Medications  prescribed:  Outpatient Encounter Medications as of 11/11/2020   Medication Sig Dispense Refill   • acetone, urine, test strip Use as directed 50 each 1   • APIDRA 100 UNIT/ML injection INJECT 100 UNITS UNDER THE SKIN EVERY DAY 90 mL 3   • aspirin 81 MG tablet Take 1 tablet by mouth daily.     • buPROPion XL (WELLBUTRIN XL) 300 MG 24 hr tablet TAKE 1 TABLET BY MOUTH EVERY MORNING 90 tablet 0   • BYSTOLIC 10 MG tablet Take 1 tablet by mouth Daily. 90 tablet 3   • calcium carbonate (Tums Ultra 1000) 1000 MG chewable tablet      • folic acid (FOLVITE) 1 MG tablet Take 1 tablet by mouth Daily. 90 tablet 0   • glucagon (GLUCAGON EMERGENCY) 1 MG injection Use as directed 2 kit 5   • glucose blood test strip Mari Contour Next Test In Vitro Strip; Patient Sig: Mari Contour Next Test In Vitro Strip test 8 times daily; 240; 5; 10-Nov-2014; Active     • LANTUS 100 UNIT/ML injection 21 units subcutaneous daily 10 mL 5   • Multiple Minerals-Vitamins (CITRACAL PLUS BONE DENSITY) tablet Take by mouth.     • raloxifene (EVISTA) 60 MG tablet Take 1 tablet by mouth Daily. 90 tablet 3   • rosuvastatin (CRESTOR) 10 MG tablet Take 1 tablet by mouth Every Night. 90 tablet 3   • SYNTHROID 75 MCG tablet Take 1 tablet by mouth Daily. 90 tablet 3   • vitamin D (ERGOCALCIFEROL) 1.25 MG (79219 UT) capsule capsule Take 1 capsule by mouth Every 7 (Seven) Days. 13 capsule 3   • vitamin E 400 UNIT capsule Take 1 capsule by mouth daily.       No facility-administered encounter medications on file as of 11/11/2020.        Orders placed during this encounter include:  No orders of the defined types were placed in this encounter.

## 2020-11-16 ENCOUNTER — TELEPHONE (OUTPATIENT)
Dept: ENDOCRINOLOGY | Age: 65
End: 2020-11-16

## 2020-11-16 NOTE — TELEPHONE ENCOUNTER
Patient had telehealth with lauri in nov 11    Needs to be seen every three months for pump supplies    There are no openings   Any suggestions

## 2020-11-24 RX ORDER — BUPROPION HYDROCHLORIDE 300 MG/1
300 TABLET ORAL EVERY MORNING
Qty: 90 TABLET | Refills: 0 | Status: SHIPPED | OUTPATIENT
Start: 2020-11-24

## 2020-12-19 RX ORDER — FOLIC ACID 1 MG/1
1000 TABLET ORAL DAILY
Qty: 90 TABLET | Refills: 0 | Status: SHIPPED | OUTPATIENT
Start: 2020-12-19 | End: 2021-03-23

## 2021-01-11 DIAGNOSIS — E78.5 DYSLIPIDEMIA: ICD-10-CM

## 2021-01-11 DIAGNOSIS — Z46.81 INSULIN PUMP TITRATION: ICD-10-CM

## 2021-01-11 DIAGNOSIS — I10 ESSENTIAL HYPERTENSION: ICD-10-CM

## 2021-01-11 DIAGNOSIS — R53.82 CHRONIC FATIGUE: ICD-10-CM

## 2021-01-11 DIAGNOSIS — E55.9 AVITAMINOSIS D: ICD-10-CM

## 2021-01-11 RX ORDER — RALOXIFENE HYDROCHLORIDE 60 MG/1
60 TABLET, FILM COATED ORAL DAILY
Qty: 90 TABLET | Refills: 1 | Status: SHIPPED | OUTPATIENT
Start: 2021-01-11 | End: 2021-10-12 | Stop reason: ALTCHOICE

## 2021-03-19 ENCOUNTER — BULK ORDERING (OUTPATIENT)
Dept: CASE MANAGEMENT | Facility: OTHER | Age: 66
End: 2021-03-19

## 2021-03-19 DIAGNOSIS — Z23 IMMUNIZATION DUE: ICD-10-CM

## 2021-03-23 RX ORDER — FOLIC ACID 1 MG/1
1000 TABLET ORAL DAILY
Qty: 90 TABLET | Refills: 0 | Status: SHIPPED | OUTPATIENT
Start: 2021-03-23

## 2021-03-29 ENCOUNTER — LAB (OUTPATIENT)
Dept: ENDOCRINOLOGY | Age: 66
End: 2021-03-29

## 2021-03-29 DIAGNOSIS — E78.5 DYSLIPIDEMIA: ICD-10-CM

## 2021-03-29 DIAGNOSIS — E10.641 UNCONTROLLED TYPE 1 DIABETES MELLITUS WITH HYPOGLYCEMIA AND COMA (HCC): ICD-10-CM

## 2021-03-29 DIAGNOSIS — E55.9 AVITAMINOSIS D: Primary | ICD-10-CM

## 2021-03-29 DIAGNOSIS — E03.9 PRIMARY HYPOTHYROIDISM: ICD-10-CM

## 2021-03-29 DIAGNOSIS — E55.9 AVITAMINOSIS D: ICD-10-CM

## 2021-03-30 LAB
25(OH)D3+25(OH)D2 SERPL-MCNC: 58.2 NG/ML (ref 30–100)
ALBUMIN SERPL-MCNC: 3.8 G/DL (ref 3.5–5.2)
ALBUMIN/CREAT UR: 4 MG/G CREAT (ref 0–29)
ALBUMIN/GLOB SERPL: 1.9 G/DL
ALP SERPL-CCNC: 86 U/L (ref 39–117)
ALT SERPL-CCNC: 11 U/L (ref 1–33)
AST SERPL-CCNC: 14 U/L (ref 1–32)
BILIRUB SERPL-MCNC: 0.4 MG/DL (ref 0–1.2)
BUN SERPL-MCNC: 14 MG/DL (ref 8–23)
BUN/CREAT SERPL: 9.7 (ref 7–25)
CA-I SERPL ISE-MCNC: 5.5 MG/DL (ref 4.5–5.6)
CALCIUM SERPL-MCNC: 9.1 MG/DL (ref 8.6–10.5)
CHLORIDE SERPL-SCNC: 104 MMOL/L (ref 98–107)
CHOLEST SERPL-MCNC: 127 MG/DL (ref 0–200)
CO2 SERPL-SCNC: 25 MMOL/L (ref 22–29)
CREAT SERPL-MCNC: 1.44 MG/DL (ref 0.57–1)
CREAT UR-MCNC: 144.7 MG/DL
GLOBULIN SER CALC-MCNC: 2 GM/DL
GLUCOSE SERPL-MCNC: 203 MG/DL (ref 65–99)
HBA1C MFR BLD: 7.8 % (ref 4.8–5.6)
HDLC SERPL-MCNC: 60 MG/DL (ref 40–60)
IMP & REVIEW OF LAB RESULTS: NORMAL
LDLC SERPL CALC-MCNC: 53 MG/DL (ref 0–100)
Lab: NORMAL
MICROALBUMIN UR-MCNC: 5.8 UG/ML
PHOSPHATE SERPL-MCNC: 3.3 MG/DL (ref 2.5–4.5)
POTASSIUM SERPL-SCNC: 4.7 MMOL/L (ref 3.5–5.2)
PROT SERPL-MCNC: 5.8 G/DL (ref 6–8.5)
PTH-INTACT SERPL-MCNC: 46 PG/ML (ref 15–65)
SODIUM SERPL-SCNC: 139 MMOL/L (ref 136–145)
T3FREE SERPL-MCNC: 2.6 PG/ML (ref 2–4.4)
T4 FREE SERPL-MCNC: 1.4 NG/DL (ref 0.93–1.7)
TRIGL SERPL-MCNC: 71 MG/DL (ref 0–150)
TSH SERPL DL<=0.005 MIU/L-ACNC: 0.98 UIU/ML (ref 0.27–4.2)
VLDLC SERPL CALC-MCNC: 14 MG/DL (ref 5–40)

## 2021-04-09 ENCOUNTER — OFFICE VISIT (OUTPATIENT)
Dept: ENDOCRINOLOGY | Age: 66
End: 2021-04-09

## 2021-04-09 VITALS
WEIGHT: 163 LBS | BODY MASS INDEX: 30.78 KG/M2 | HEIGHT: 61 IN | SYSTOLIC BLOOD PRESSURE: 112 MMHG | DIASTOLIC BLOOD PRESSURE: 72 MMHG

## 2021-04-09 DIAGNOSIS — E55.9 VITAMIN D DEFICIENCY: ICD-10-CM

## 2021-04-09 DIAGNOSIS — E78.5 HYPERLIPIDEMIA, UNSPECIFIED HYPERLIPIDEMIA TYPE: ICD-10-CM

## 2021-04-09 DIAGNOSIS — E10.641 UNCONTROLLED TYPE 1 DIABETES MELLITUS WITH HYPOGLYCEMIA AND COMA (HCC): Primary | ICD-10-CM

## 2021-04-09 DIAGNOSIS — E03.9 PRIMARY HYPOTHYROIDISM: ICD-10-CM

## 2021-04-09 DIAGNOSIS — I10 ESSENTIAL HYPERTENSION: ICD-10-CM

## 2021-04-09 PROCEDURE — 99214 OFFICE O/P EST MOD 30 MIN: CPT | Performed by: NURSE PRACTITIONER

## 2021-04-09 RX ORDER — LEVOTHYROXINE SODIUM 50 MCG
50 TABLET ORAL DAILY
Qty: 90 TABLET | Refills: 1 | Status: SHIPPED | OUTPATIENT
Start: 2021-04-09 | End: 2021-08-16 | Stop reason: SDUPTHER

## 2021-04-09 NOTE — PROGRESS NOTES
"  Subjective    Dayanna Lewis is a 65 y.o. female. she is here today for follow-up.  Chief Complaint   Patient presents with   • Diabetes     /72   Ht 154.9 cm (60.98\")   Wt 73.9 kg (163 lb)   LMP  (LMP Unknown)   BMI 30.81 kg/m²     History of Present Illness   Encounter Diagnoses   Name Primary?   • Uncontrolled type 1 diabetes mellitus with hypoglycemia and coma (CMS/HCC) Yes   • Primary hypothyroidism    • Essential hypertension    • Vitamin D deficiency    • Hyperlipidemia, unspecified hyperlipidemia type      65-year-old female patient here today for follow-up visit.  She has been seen for the above-mentioned problems.  She exhibits some tremors in her hands.  We discussed decreasing her thyroid medication to 50 mcg.  A new prescription has been sent.  Her A1c is stable at 7.8.  She will be referred to a nephrologist for further evaluation and monitoring of her kidney function.  She has had type 1 diabetes for over 20 years.  She has never been to a nephrologist.  She is taking her medications as prescribed.  She has had no significant hypoglycemic events.  She does have some higher postprandial blood sugars and she has been advised to target the amount of carbs that she is putting in her pump as well as her carb insulin ratio.    The following portions of the patient's history were reviewed and updated as appropriate:   Past Medical History:   Diagnosis Date   • Depression    • Dermatomyositis (CMS/HCC)     TYPE 2   • Diabetes mellitus type 1, uncontrolled (CMS/HCC)    • Disease of thyroid gland    • Dyslipidemia    • Hyperlipidemia    • Hypertension    • Hypothyroidism    • Menopausal disorder    • Osteoporosis    • Polymyositis (CMS/HCC)    • Vitamin D deficiency      Past Surgical History:   Procedure Laterality Date   •  SECTION     • INNER EAR SURGERY     • KNEE SURGERY     • OTHER SURGICAL HISTORY      EAR SURGERY     Family History   Problem Relation Age of Onset   • Depression Mother "    • Glaucoma Mother    • Deep vein thrombosis Mother    • Diabetes Father    • Glaucoma Father    • Hypertension Father    • Diabetes Sister    • Thyroid disease Sister    • Diabetes Brother    • Breast cancer Neg Hx    • Ovarian cancer Neg Hx    • Uterine cancer Neg Hx    • Colon cancer Neg Hx    • Pulmonary embolism Neg Hx      OB History        2    Para   2    Term                AB        Living   2       SAB        TAB        Ectopic        Molar        Multiple        Live Births                  Current Outpatient Medications   Medication Sig Dispense Refill   • acetone, urine, test strip Use as directed 50 each 1   • APIDRA 100 UNIT/ML injection INJECT 100 UNITS UNDER THE SKIN EVERY DAY 90 mL 3   • aspirin 81 MG tablet Take 1 tablet by mouth daily.     • buPROPion XL (WELLBUTRIN XL) 300 MG 24 hr tablet TAKE 1 TABLET BY MOUTH EVERY MORNING 90 tablet 0   • BYSTOLIC 10 MG tablet Take 1 tablet by mouth Daily. 90 tablet 3   • calcium carbonate (Tums Ultra 1000) 1000 MG chewable tablet      • folic acid (FOLVITE) 1 MG tablet TAKE 1 TABLET BY MOUTH DAILY 90 tablet 0   • glucagon (GLUCAGON EMERGENCY) 1 MG injection Use as directed 2 kit 5   • glucose blood test strip Mari Contour Next Test In Vitro Strip; Patient Sig: Mari Contour Next Test In Vitro Strip test 8 times daily; 240; 5; 10-Nov-2014; Active     • LANTUS 100 UNIT/ML injection 21 units subcutaneous daily 10 mL 5   • Multiple Minerals-Vitamins (CITRACAL PLUS BONE DENSITY) tablet Take by mouth.     • raloxifene (EVISTA) 60 MG tablet Take 1 tablet by mouth Daily. 90 tablet 1   • rosuvastatin (CRESTOR) 10 MG tablet Take 1 tablet by mouth Every Night. 90 tablet 3   • SYNTHROID 75 MCG tablet Take 1 tablet by mouth Daily. 90 tablet 3   • vitamin D (ERGOCALCIFEROL) 1.25 MG (72324 UT) capsule capsule Take 1 capsule by mouth Every 7 (Seven) Days. 13 capsule 3   • vitamin E 400 UNIT capsule Take 1 capsule by mouth daily.       No current  "facility-administered medications for this visit.     No Known Allergies  Social History     Socioeconomic History   • Marital status:      Spouse name: Not on file   • Number of children: Not on file   • Years of education: Not on file   • Highest education level: Not on file   Tobacco Use   • Smoking status: Never Smoker   • Smokeless tobacco: Never Used   Substance and Sexual Activity   • Alcohol use: No   • Drug use: No   • Sexual activity: Not Currently     Partners: Male     Birth control/protection: Post-menopausal       Review of Systems  Review of Systems   Constitutional: Negative for appetite change and fatigue.   Eyes: Negative for visual disturbance.   Respiratory: Negative for cough.    Cardiovascular: Negative for leg swelling.   Gastrointestinal: Negative for constipation and diarrhea.   Endocrine: Negative for cold intolerance, heat intolerance, polydipsia, polyphagia and polyuria.   Genitourinary: Negative for frequency.   Neurological: Negative for numbness.        Objective    Ht 154.9 cm (60.98\")   LMP  (LMP Unknown)   BMI 30.97 kg/m²   Physical Exam  Vitals and nursing note reviewed.   Constitutional:       General: She is not in acute distress.     Appearance: Normal appearance. She is well-developed. She is obese. She is not diaphoretic.   HENT:      Head: Normocephalic and atraumatic.      Right Ear: External ear normal.      Left Ear: External ear normal.      Nose: Nose normal.      Mouth/Throat:      Pharynx: No oropharyngeal exudate.   Eyes:      General:         Right eye: No discharge.         Left eye: No discharge.      Pupils: Pupils are equal, round, and reactive to light.   Neck:      Thyroid: No thyroid mass or thyromegaly.      Vascular: No carotid bruit.      Trachea: Trachea normal. No tracheal tenderness or tracheal deviation.   Cardiovascular:      Rate and Rhythm: Normal rate and regular rhythm.      Heart sounds: Normal heart sounds. No murmur heard.   No friction " rub. No gallop.    Pulmonary:      Effort: Pulmonary effort is normal. No respiratory distress.      Breath sounds: Normal breath sounds. No stridor. No wheezing or rales.   Abdominal:      General: There is no distension.   Musculoskeletal:         General: No deformity. Normal range of motion.      Cervical back: Full passive range of motion without pain, normal range of motion and neck supple. No edema or erythema.   Lymphadenopathy:      Cervical: No cervical adenopathy.   Skin:     General: Skin is warm and dry.      Coloration: Skin is not pale.      Findings: No erythema or rash.   Neurological:      Mental Status: She is alert and oriented to person, place, and time.      Comments: Tremors both hands   Psychiatric:         Behavior: Behavior normal.         Thought Content: Thought content normal.         Judgment: Judgment normal.         Lab Review  Sodium (mmol/L)   Date Value   03/29/2021 139   10/26/2020 145   06/18/2020 138     Potassium (mmol/L)   Date Value   03/29/2021 4.7   10/26/2020 4.3   06/18/2020 4.5     Chloride (mmol/L)   Date Value   03/29/2021 104   10/26/2020 109 (H)   06/18/2020 103     Total CO2 (mmol/L)   Date Value   03/29/2021 25.0   10/26/2020 27.1   06/18/2020 26.4     BUN (mg/dL)   Date Value   03/29/2021 14   10/26/2020 18   06/18/2020 16     Creatinine (mg/dL)   Date Value   03/29/2021 1.44 (H)   10/26/2020 1.41 (H)   06/18/2020 1.64 (H)     Hemoglobin A1C (%)   Date Value   03/29/2021 7.80 (H)   10/26/2020 7.50 (H)   06/18/2020 8.00 (H)     Triglycerides (mg/dL)   Date Value   03/29/2021 71   10/26/2020 90   06/18/2020 113     LDL Cholesterol  (mg/dL)   Date Value   06/18/2020 52   03/20/2020 57   12/20/2019 56     LDL Chol Calc (NIH) (mg/dL)   Date Value   03/29/2021 53   10/26/2020 74       Assessment/Plan    No diagnosis found..    Medications prescribed:  Outpatient Encounter Medications as of 4/9/2021   Medication Sig Dispense Refill   • acetone, urine, test strip Use as  directed 50 each 1   • APIDRA 100 UNIT/ML injection INJECT 100 UNITS UNDER THE SKIN EVERY DAY 90 mL 3   • aspirin 81 MG tablet Take 1 tablet by mouth daily.     • buPROPion XL (WELLBUTRIN XL) 300 MG 24 hr tablet TAKE 1 TABLET BY MOUTH EVERY MORNING 90 tablet 0   • BYSTOLIC 10 MG tablet Take 1 tablet by mouth Daily. 90 tablet 3   • calcium carbonate (Tums Ultra 1000) 1000 MG chewable tablet      • folic acid (FOLVITE) 1 MG tablet TAKE 1 TABLET BY MOUTH DAILY 90 tablet 0   • glucagon (GLUCAGON EMERGENCY) 1 MG injection Use as directed 2 kit 5   • glucose blood test strip Mari Contour Next Test In Vitro Strip; Patient Sig: Mari Contour Next Test In Vitro Strip test 8 times daily; 240; 5; 10-Nov-2014; Active     • LANTUS 100 UNIT/ML injection 21 units subcutaneous daily 10 mL 5   • Multiple Minerals-Vitamins (CITRACAL PLUS BONE DENSITY) tablet Take by mouth.     • raloxifene (EVISTA) 60 MG tablet Take 1 tablet by mouth Daily. 90 tablet 1   • rosuvastatin (CRESTOR) 10 MG tablet Take 1 tablet by mouth Every Night. 90 tablet 3   • SYNTHROID 75 MCG tablet Take 1 tablet by mouth Daily. 90 tablet 3   • vitamin D (ERGOCALCIFEROL) 1.25 MG (40303 UT) capsule capsule Take 1 capsule by mouth Every 7 (Seven) Days. 13 capsule 3   • vitamin E 400 UNIT capsule Take 1 capsule by mouth daily.       No facility-administered encounter medications on file as of 4/9/2021.       Orders placed during this encounter include:  No orders of the defined types were placed in this encounter.      Patient was seen and examined.  Metabolically and clinically she appears is stable.  We will decrease her Synthroid from 75 down to 50 mcg.  She will continue on her current dose of Crestor 10 mg.  Vitamin D is stable.  She has been referred to nephrology for further evaluation and monitoring of her kidney function.  Weight is stable.  A1c is above goal at 7.  She has been advised to monitor her carb insulin ratio and make sure she is counting her carbs  appropriately.  Blood pressures in satisfactory range and weight is stable.  She has been encouraged to contact the office should she have any questions or concerns.  Her sensor download shows that her average blood sugar is 183.  She has an target range 52% of the time she is high 34% of the time and very high 13% of the time.  Her standard deviation is 58 mg/dL.

## 2021-04-26 ENCOUNTER — TELEPHONE (OUTPATIENT)
Dept: ENDOCRINOLOGY | Age: 66
End: 2021-04-26

## 2021-04-26 NOTE — TELEPHONE ENCOUNTER
THIS PATIENT IS MISSING INSULIN PUMP SCAN FOR HER LAST APPT ON  4/09/21 IF YOU HAVE IT I CAN SCAN IT INTO THE Epic FOR YOU

## 2021-04-29 ENCOUNTER — TELEPHONE (OUTPATIENT)
Dept: ENDOCRINOLOGY | Age: 66
End: 2021-04-29

## 2021-06-04 ENCOUNTER — TELEPHONE (OUTPATIENT)
Dept: ENDOCRINOLOGY | Age: 66
End: 2021-06-04

## 2021-06-04 RX ORDER — BUPROPION HYDROCHLORIDE 300 MG/1
300 TABLET ORAL EVERY MORNING
Qty: 1 TABLET | Refills: 0 | OUTPATIENT
Start: 2021-06-04

## 2021-06-04 NOTE — TELEPHONE ENCOUNTER
Urgent  Pharmacy sent over request last week  Pt called office three times    Bupropion xk  300 mg    Pt is ouit     PLEASE CALL THE PATIENT IF THIS CAN NOT BE FILLED     [phone]

## 2021-06-22 ENCOUNTER — OFFICE VISIT (OUTPATIENT)
Dept: OBSTETRICS AND GYNECOLOGY | Facility: CLINIC | Age: 66
End: 2021-06-22

## 2021-06-22 VITALS
DIASTOLIC BLOOD PRESSURE: 64 MMHG | WEIGHT: 169 LBS | BODY MASS INDEX: 33.18 KG/M2 | SYSTOLIC BLOOD PRESSURE: 136 MMHG | HEIGHT: 60 IN | HEART RATE: 74 BPM

## 2021-06-22 DIAGNOSIS — Z01.419 ENCOUNTER FOR GYNECOLOGICAL EXAMINATION WITHOUT ABNORMAL FINDING: Primary | ICD-10-CM

## 2021-06-22 PROCEDURE — G0101 CA SCREEN;PELVIC/BREAST EXAM: HCPCS | Performed by: OBSTETRICS & GYNECOLOGY

## 2021-06-22 RX ORDER — FAMOTIDINE 40 MG/1
40 TABLET, FILM COATED ORAL DAILY
COMMUNITY
Start: 2021-05-19

## 2021-06-22 RX ORDER — LOSARTAN POTASSIUM 25 MG/1
25 TABLET ORAL
COMMUNITY
Start: 2021-06-11

## 2021-06-22 NOTE — PROGRESS NOTES
GYN Annual Exam     CC- Here for annual exam.     Dayanna Lewis is a 65 y.o. female who presents for annual well woman exam. Periods are absent due to Menopause.     OB History        2    Para   2    Term                AB        Living   2       SAB        TAB        Ectopic        Molar        Multiple        Live Births                    Current contraception: post menopausal status  History of abnormal Pap smear: no  Family history of uterine, colon or ovarian cancer: no  History of abnormal mammogram: no  Family history of breast cancer: no  Last Pap : 2019 NL HPV neg   Last mammogram: 2020   Last colonoscopy: 5 yrs ago  Last DEXA: in the past  Parental Hip Fracture: No    Past Medical History:   Diagnosis Date   • Depression    • Dermatomyositis (CMS/HCC)     TYPE 2   • Diabetes mellitus type 1, uncontrolled (CMS/HCC)    • Disease of thyroid gland    • Dyslipidemia    • Hyperlipidemia    • Hypertension    • Hypothyroidism    • Menopausal disorder    • Osteoporosis    • Polymyositis (CMS/HCC)    • Vitamin D deficiency        Past Surgical History:   Procedure Laterality Date   •  SECTION     • INNER EAR SURGERY     • KNEE SURGERY     • OTHER SURGICAL HISTORY      EAR SURGERY         Current Outpatient Medications:   •  APIDRA 100 UNIT/ML injection, INJECT 100 UNITS UNDER THE SKIN EVERY DAY, Disp: 90 mL, Rfl: 3  •  aspirin 81 MG tablet, Take 1 tablet by mouth daily., Disp: , Rfl:   •  buPROPion XL (WELLBUTRIN XL) 300 MG 24 hr tablet, TAKE 1 TABLET BY MOUTH EVERY MORNING, Disp: 90 tablet, Rfl: 0  •  BYSTOLIC 10 MG tablet, Take 1 tablet by mouth Daily., Disp: 90 tablet, Rfl: 3  •  calcium carbonate (Tums Ultra 1000) 1000 MG chewable tablet, , Disp: , Rfl:   •  famotidine (PEPCID) 40 MG tablet, Take 40 mg by mouth Daily., Disp: , Rfl:   •  folic acid (FOLVITE) 1 MG tablet, TAKE 1 TABLET BY MOUTH DAILY, Disp: 90 tablet, Rfl: 0  •  glucagon (GLUCAGON EMERGENCY) 1 MG injection, Use  "as directed, Disp: 2 kit, Rfl: 5  •  glucose blood test strip, Mari Contour Next Test In Vitro Strip; Patient Sig: Mari Contour Next Test In Vitro Strip test 8 times daily; 240; 5; 10-Nov-2014; Active, Disp: , Rfl:   •  LANTUS 100 UNIT/ML injection, 21 units subcutaneous daily, Disp: 10 mL, Rfl: 5  •  losartan (COZAAR) 25 MG tablet, Take 25 mg by mouth every night at bedtime., Disp: , Rfl:   •  Multiple Minerals-Vitamins (CITRACAL PLUS BONE DENSITY) tablet, Take by mouth., Disp: , Rfl:   •  raloxifene (EVISTA) 60 MG tablet, Take 1 tablet by mouth Daily., Disp: 90 tablet, Rfl: 1  •  rosuvastatin (CRESTOR) 10 MG tablet, Take 1 tablet by mouth Every Night., Disp: 90 tablet, Rfl: 3  •  Synthroid 50 MCG tablet, Take 1 tablet by mouth Daily., Disp: 90 tablet, Rfl: 1  •  vitamin D (ERGOCALCIFEROL) 1.25 MG (26111 UT) capsule capsule, Take 1 capsule by mouth Every 7 (Seven) Days., Disp: 13 capsule, Rfl: 3  •  vitamin E 400 UNIT capsule, Take 1 capsule by mouth daily., Disp: , Rfl:     No Known Allergies    Social History     Tobacco Use   • Smoking status: Never Smoker   • Smokeless tobacco: Never Used   Substance Use Topics   • Alcohol use: No   • Drug use: No       Family History   Problem Relation Age of Onset   • Depression Mother    • Glaucoma Mother    • Deep vein thrombosis Mother    • Diabetes Father    • Glaucoma Father    • Hypertension Father    • Diabetes Sister    • Thyroid disease Sister    • Diabetes Brother    • Breast cancer Neg Hx    • Ovarian cancer Neg Hx    • Uterine cancer Neg Hx    • Colon cancer Neg Hx    • Pulmonary embolism Neg Hx        Review of Systems   Constitutional: Negative for chills, fever and unexpected weight loss.   Gastrointestinal: Negative for abdominal pain.   Genitourinary: Negative for dysuria, pelvic pain, vaginal bleeding and vaginal discharge.   All other systems reviewed and are negative.      /64   Pulse 74   Ht 152.4 cm (60\")   Wt 76.7 kg (169 lb)   LMP  (LMP " Unknown)   Breastfeeding No   BMI 33.01 kg/m²     Physical Exam  Constitutional:       General: She is not in acute distress.     Appearance: She is well-developed. She is obese.   Genitourinary:      Pelvic exam was performed with patient supine.      Vulva, urethra, bladder, vagina, right adnexa, left adnexa and rectum normal.      No vulval lesion or Bartholin's cyst noted.      No posterior fourchette lesion present.      No inguinal adenopathy present in the right or left side.     Vaginal atrophy present.      No vaginal discharge or bleeding.      No cervical motion tenderness or friability.      Uterus is not enlarged or tender.      No uterine mass detected.     Uterus is anteverted and regular.      Right and left adnexa are non-palpable.   Rectum:      No rectal mass or abnormal anal tone.   HENT:      Head: Normocephalic and atraumatic.   Eyes:      Conjunctiva/sclera: Conjunctivae normal.      Pupils: Pupils are equal, round, and reactive to light.   Neck:      Thyroid: No thyromegaly.   Cardiovascular:      Rate and Rhythm: Normal rate and regular rhythm.      Heart sounds: Normal heart sounds. No murmur heard.     Pulmonary:      Effort: Pulmonary effort is normal. No respiratory distress.      Breath sounds: Normal breath sounds.   Chest:      Breasts:         Right: No inverted nipple, mass or nipple discharge.         Left: No inverted nipple, mass or nipple discharge.   Abdominal:      General: Abdomen is flat. There is no distension.      Palpations: Abdomen is soft.      Tenderness: There is no abdominal tenderness.   Musculoskeletal:         General: No deformity. Normal range of motion.      Cervical back: Normal range of motion and neck supple.   Lymphadenopathy:      Lower Body: No right inguinal adenopathy. No left inguinal adenopathy.   Neurological:      Mental Status: She is alert and oriented to person, place, and time.   Skin:     General: Skin is warm and dry.      Findings: No  erythema.   Psychiatric:         Behavior: Behavior normal.   Vitals reviewed. Exam conducted with a chaperone present.             Assessment     Diagnoses and all orders for this visit:    1. Encounter for gynecological examination without abnormal finding (Primary)  -     IGP, Rfx Aptima HPV ASCU    On Evista, unsure why - Suspect bone health      Plan     1) Breast Health - Clinical breast exam & mammogram reviewed specifically [employer] recommendations for screening specific to her, and Self breast awareness monthly  2) Pap - updated today (Medicare)  3) Smoking status- Non-smoker   4) Colon health - screening colonoscopy up to date  5) Bone health - Weight bearing exercise, dietary calcium recommendations and vitamin D reviewed.   FRAX risk 14% and 0.9%   Defer Evista, consider DEXA at 65 years for screen   6) Encouraged to be wary of information obtained via social media and internet based on source and search.   7) Follow up prn and one year      Inocente Arredondo MD   6/22/2021  09:37 EDT

## 2021-07-02 ENCOUNTER — OFFICE VISIT (OUTPATIENT)
Dept: ENDOCRINOLOGY | Age: 66
End: 2021-07-02

## 2021-07-02 VITALS
BODY MASS INDEX: 33.41 KG/M2 | WEIGHT: 170.2 LBS | HEIGHT: 60 IN | DIASTOLIC BLOOD PRESSURE: 78 MMHG | SYSTOLIC BLOOD PRESSURE: 128 MMHG

## 2021-07-02 DIAGNOSIS — E10.65 TYPE 1 DIABETES MELLITUS WITH HYPERGLYCEMIA (HCC): Primary | ICD-10-CM

## 2021-07-02 DIAGNOSIS — E78.5 DYSLIPIDEMIA: ICD-10-CM

## 2021-07-02 DIAGNOSIS — E03.9 PRIMARY HYPOTHYROIDISM: ICD-10-CM

## 2021-07-02 PROCEDURE — 99214 OFFICE O/P EST MOD 30 MIN: CPT | Performed by: NURSE PRACTITIONER

## 2021-07-02 NOTE — PROGRESS NOTES
"Chief Complaint  Diabetes    Subjective          Dayanna Lewis presents to [facility] END[facility]INOLOG[facility]  History of Present Illness     Type 1 dm    Diagnosed about 40 years ago.   Today in clinic pt reports being on medtronic pump   FBG - 180s  Pre meals - 120-180  Checks BG - if dexcom seems off   Insulin pump - medtronic 670  Sensor - dexcom  Dm retinopathy - no,Last eye exam - yearly  Dm nephropathy - unsure, recent nephrology eval waiting on lab results   Dm neuropathy - no, sees podiatry   CAD - no  CVA - no  Episodes of hypoglycemia - 1% on cgm download  Pt is physically active. weight has been stable.   Pt tries to follow DM diet for most part.   On ARB    Average , GMI 7.8%, time in range 49%, 49% high     HLP  Rosuvastatin 10mg daily   Lab Results   Component Value Date    CHLPL 127 03/29/2021    TRIG 71 03/29/2021    HDL 60 03/29/2021    LDL 53 03/29/2021         Hypothyroid  Synthroid 50mcg daily   Lab Results   Component Value Date    TSH 0.980 03/29/2021         Objective   Vital Signs:   /78 (BP Location: Right arm, Patient Position: Sitting, Cuff Size: Adult)   Ht 152.4 cm (60\")   Wt 77.2 kg (170 lb 3.2 oz)   BMI 33.24 kg/m²     Physical Exam  Vitals reviewed.   Constitutional:       General: She is not in acute distress.  HENT:      Head: Normocephalic and atraumatic.   Cardiovascular:      Rate and Rhythm: Normal rate and regular rhythm.   Pulmonary:      Effort: Pulmonary effort is normal. No respiratory distress.   Musculoskeletal:         General: No signs of injury. Normal range of motion.      Cervical back: Normal range of motion and neck supple.   Skin:     General: Skin is warm and dry.   Neurological:      Mental Status: She is alert and oriented to person, place, and time. Mental status is at baseline.   Psychiatric:         Mood and Affect: Mood normal.         Behavior: Behavior normal.         Thought Content: Thought content normal.         Judgment: " Judgment normal.           Result Review :   The following data was reviewed by: GABRIELA Hay on 07/02/2021:  Common labs    Common Labsle 10/26/20 10/26/20 10/26/20 10/26/20 3/29/21 3/29/21 3/29/21 3/29/21    0831 0831 0831 0831 1002 1002 1002 1002   Glucose    85  203 (A)     BUN    18  14     Creatinine    1.41 (A)  1.44 (A)     eGFR Non  Am    38 (A)  37 (A)     eGFR  Am    46 (A)  44 (A)     Sodium    145  139     Potassium    4.3  4.7     Chloride    109 (A)  104     Calcium    9.3  9.1     Total Protein    6.0  5.8 (A)     Albumin    4.00  3.80     Total Bilirubin    0.2  0.4     Alkaline Phosphatase    89  86     AST (SGOT)    13  14     ALT (SGPT)    12  11     Total Cholesterol   150    127    Triglycerides   90    71    HDL Cholesterol   59    60    LDL Cholesterol    74    53    Hemoglobin A1C 7.50 (A)    7.80 (A)      Microalbumin, Urine  6.7      5.8   (A) Abnormal value       Comments are available for some flowsheets but are not being displayed.                     Assessment and Plan    Diagnoses and all orders for this visit:    1. Type 1 diabetes mellitus with hyperglycemia (CMS/HCC) (Primary)  -     Basic Metabolic Panel  -     CBC Auto Differential  -     Hemoglobin A1c  -     TSH; Future  -     T4, Free; Future  -     T3, Free; Future  -     Hemoglobin A1c; Future  -     Comprehensive Metabolic Panel; Future  -     Lipid Panel; Future  -     Microalbumin / Creatinine Urine Ratio - Urine, Clean Catch; Future    2. Dyslipidemia  -     Comprehensive Metabolic Panel; Future  -     Lipid Panel; Future    3. Primary hypothyroidism  -     TSH; Future  -     T4, Free; Future  -     T3, Free; Future    Other orders  -     glucagon (GLUCAGEN) 1 MG injection; Use as directed  Dispense: 2 kit; Refill: 5        Follow Up   No follow-ups on file.     Has f/u with dr monson in October  See pump changes below  Skin irritation with adhesive- start with barrier wipe   On statin and  arb  Continue current t4 dose  Needs close monitoring to reduce risk of complications from over or under treatment with thyroid hormone replacement    Patient was given instructions and counseling regarding her condition or for health maintenance advice. Please see specific information pulled into the AVS if appropriate.       CURRENT PUMP SETTINGS  Carb ratio  12a-1p 10  1p-7p 12 -->10  7p-12a 10  Sensitivity 50  Target 110-130  AIT 3hr    Basal  12a-3a 1.5  3a-8a 0.9 -->1.1  8a-3p 1.7  3p 12a 1.75 -->1.95    Yanira Allen, APRN

## 2021-07-06 RX ORDER — INSULIN LISPRO-AABC 100 [IU]/ML
100 INJECTION, SOLUTION INTRAVENOUS; SUBCUTANEOUS DAILY
Qty: 90 ML | Refills: 0 | Status: SHIPPED | OUTPATIENT
Start: 2021-07-06 | End: 2021-10-12 | Stop reason: ALTCHOICE

## 2021-07-07 LAB
BASOPHILS # BLD AUTO: 0.09 10*3/MM3 (ref 0–0.2)
BASOPHILS NFR BLD AUTO: 1.5 % (ref 0–1.5)
CHOLEST SERPL-MCNC: 141 MG/DL (ref 0–200)
EOSINOPHIL # BLD AUTO: 0.4 10*3/MM3 (ref 0–0.4)
EOSINOPHIL NFR BLD AUTO: 6.6 % (ref 0.3–6.2)
ERYTHROCYTE [DISTWIDTH] IN BLOOD BY AUTOMATED COUNT: 11.8 % (ref 12.3–15.4)
HBA1C MFR BLD: 8 % (ref 4.8–5.6)
HCT VFR BLD AUTO: 40.5 % (ref 34–46.6)
HDLC SERPL-MCNC: 60 MG/DL (ref 40–60)
HGB BLD-MCNC: 13.5 G/DL (ref 12–15.9)
IMM GRANULOCYTES # BLD AUTO: 0.03 10*3/MM3 (ref 0–0.05)
IMM GRANULOCYTES NFR BLD AUTO: 0.5 % (ref 0–0.5)
IMP & REVIEW OF LAB RESULTS: NORMAL
LDLC SERPL CALC-MCNC: 68 MG/DL (ref 0–100)
LYMPHOCYTES # BLD AUTO: 1.23 10*3/MM3 (ref 0.7–3.1)
LYMPHOCYTES NFR BLD AUTO: 20.2 % (ref 19.6–45.3)
MCH RBC QN AUTO: 32.8 PG (ref 26.6–33)
MCHC RBC AUTO-ENTMCNC: 33.3 G/DL (ref 31.5–35.7)
MCV RBC AUTO: 98.5 FL (ref 79–97)
MONOCYTES # BLD AUTO: 0.58 10*3/MM3 (ref 0.1–0.9)
MONOCYTES NFR BLD AUTO: 9.5 % (ref 5–12)
NEUTROPHILS # BLD AUTO: 3.77 10*3/MM3 (ref 1.7–7)
NEUTROPHILS NFR BLD AUTO: 61.7 % (ref 42.7–76)
NRBC BLD AUTO-RTO: 0 /100 WBC (ref 0–0.2)
PLATELET # BLD AUTO: 282 10*3/MM3 (ref 140–450)
RBC # BLD AUTO: 4.11 10*6/MM3 (ref 3.77–5.28)
TRIGL SERPL-MCNC: 66 MG/DL (ref 0–150)
VLDLC SERPL CALC-MCNC: 13 MG/DL (ref 5–40)
WBC # BLD AUTO: 6.1 10*3/MM3 (ref 3.4–10.8)

## 2021-07-07 NOTE — PROGRESS NOTES
Cholesterol looks great  A1c slightly worse- continue with pump changes made at visit. Recommend f/u with medtronic educator in 2-4 weeks

## 2021-07-12 RX ORDER — ERGOCALCIFEROL 1.25 MG/1
CAPSULE ORAL
Qty: 13 CAPSULE | Refills: 3 | Status: SHIPPED | OUTPATIENT
Start: 2021-07-12 | End: 2023-03-03

## 2021-07-13 DIAGNOSIS — E55.9 AVITAMINOSIS D: ICD-10-CM

## 2021-07-13 DIAGNOSIS — R53.82 CHRONIC FATIGUE: ICD-10-CM

## 2021-07-13 DIAGNOSIS — I10 ESSENTIAL HYPERTENSION: ICD-10-CM

## 2021-07-13 DIAGNOSIS — Z46.81 INSULIN PUMP TITRATION: ICD-10-CM

## 2021-07-13 DIAGNOSIS — E78.5 DYSLIPIDEMIA: ICD-10-CM

## 2021-07-13 RX ORDER — RALOXIFENE HYDROCHLORIDE 60 MG/1
60 TABLET, FILM COATED ORAL DAILY
Qty: 90 TABLET | Refills: 1 | OUTPATIENT
Start: 2021-07-13

## 2021-07-26 RX ORDER — ROSUVASTATIN CALCIUM 10 MG/1
TABLET, COATED ORAL
Qty: 90 TABLET | Refills: 3 | Status: SHIPPED | OUTPATIENT
Start: 2021-07-26 | End: 2022-07-20

## 2021-07-26 RX ORDER — NEBIVOLOL HYDROCHLORIDE 10 MG/1
TABLET ORAL
Qty: 90 TABLET | Refills: 3 | OUTPATIENT
Start: 2021-07-26

## 2021-08-16 RX ORDER — LEVOTHYROXINE SODIUM 50 MCG
50 TABLET ORAL DAILY
Qty: 90 TABLET | Refills: 1 | Status: SHIPPED | OUTPATIENT
Start: 2021-08-16 | End: 2022-03-03

## 2021-08-19 ENCOUNTER — TELEPHONE (OUTPATIENT)
Dept: ENDOCRINOLOGY | Age: 66
End: 2021-08-19

## 2021-08-19 ENCOUNTER — PRIOR AUTHORIZATION (OUTPATIENT)
Dept: ENDOCRINOLOGY | Age: 66
End: 2021-08-19

## 2021-08-25 ENCOUNTER — TELEPHONE (OUTPATIENT)
Dept: ENDOCRINOLOGY | Age: 66
End: 2021-08-25

## 2021-09-02 ENCOUNTER — APPOINTMENT (OUTPATIENT)
Dept: WOMENS IMAGING | Facility: HOSPITAL | Age: 66
End: 2021-09-02

## 2021-09-02 PROCEDURE — 77063 BREAST TOMOSYNTHESIS BI: CPT | Performed by: RADIOLOGY

## 2021-09-02 PROCEDURE — 77067 SCR MAMMO BI INCL CAD: CPT | Performed by: RADIOLOGY

## 2021-09-03 RX ORDER — INSULIN GLULISINE 100 [IU]/ML
INJECTION, SOLUTION SUBCUTANEOUS
Qty: 20 ML | Refills: 1 | Status: SHIPPED | OUTPATIENT
Start: 2021-09-03 | End: 2021-09-29

## 2021-09-21 ENCOUNTER — TELEPHONE (OUTPATIENT)
Dept: ENDOCRINOLOGY | Age: 66
End: 2021-09-21

## 2021-09-23 ENCOUNTER — LAB (OUTPATIENT)
Dept: ENDOCRINOLOGY | Age: 66
End: 2021-09-23

## 2021-09-23 DIAGNOSIS — E03.9 PRIMARY HYPOTHYROIDISM: ICD-10-CM

## 2021-09-23 DIAGNOSIS — E78.5 DYSLIPIDEMIA: ICD-10-CM

## 2021-09-23 DIAGNOSIS — E10.65 TYPE 1 DIABETES MELLITUS WITH HYPERGLYCEMIA (HCC): ICD-10-CM

## 2021-09-24 LAB
ALBUMIN SERPL-MCNC: 4.2 G/DL (ref 3.5–5.2)
ALBUMIN/CREAT UR: <1 MG/G CREAT (ref 0–29)
ALBUMIN/GLOB SERPL: 2.3 G/DL
ALP SERPL-CCNC: 110 U/L (ref 39–117)
ALT SERPL-CCNC: 14 U/L (ref 1–33)
AST SERPL-CCNC: 22 U/L (ref 1–32)
BILIRUB SERPL-MCNC: 0.4 MG/DL (ref 0–1.2)
BUN SERPL-MCNC: 15 MG/DL (ref 8–23)
BUN/CREAT SERPL: 10.1 (ref 7–25)
CALCIUM SERPL-MCNC: 9.4 MG/DL (ref 8.6–10.5)
CHLORIDE SERPL-SCNC: 104 MMOL/L (ref 98–107)
CHOLEST SERPL-MCNC: 131 MG/DL (ref 0–200)
CO2 SERPL-SCNC: 26.7 MMOL/L (ref 22–29)
CREAT SERPL-MCNC: 1.48 MG/DL (ref 0.57–1)
CREAT UR-MCNC: 209.6 MG/DL
GLOBULIN SER CALC-MCNC: 1.8 GM/DL
GLUCOSE SERPL-MCNC: 175 MG/DL (ref 65–99)
HBA1C MFR BLD: 7.4 % (ref 4.8–5.6)
HDLC SERPL-MCNC: 60 MG/DL (ref 40–60)
IMP & REVIEW OF LAB RESULTS: NORMAL
LDLC SERPL CALC-MCNC: 55 MG/DL (ref 0–100)
MICROALBUMIN UR-MCNC: <3 UG/ML
POTASSIUM SERPL-SCNC: 4.5 MMOL/L (ref 3.5–5.2)
PROT SERPL-MCNC: 6 G/DL (ref 6–8.5)
SODIUM SERPL-SCNC: 138 MMOL/L (ref 136–145)
T3FREE SERPL-MCNC: 2.5 PG/ML (ref 2–4.4)
T4 FREE SERPL-MCNC: 1.32 NG/DL (ref 0.93–1.7)
TRIGL SERPL-MCNC: 82 MG/DL (ref 0–150)
TSH SERPL DL<=0.005 MIU/L-ACNC: 1.62 UIU/ML (ref 0.27–4.2)
VLDLC SERPL CALC-MCNC: 16 MG/DL (ref 5–40)

## 2021-09-29 RX ORDER — INSULIN GLULISINE 100 [IU]/ML
INJECTION, SOLUTION SUBCUTANEOUS
Qty: 40 ML | Refills: 1 | Status: SHIPPED | OUTPATIENT
Start: 2021-09-29 | End: 2022-03-03

## 2021-10-12 ENCOUNTER — OFFICE VISIT (OUTPATIENT)
Dept: ENDOCRINOLOGY | Age: 66
End: 2021-10-12

## 2021-10-12 VITALS
WEIGHT: 173.2 LBS | OXYGEN SATURATION: 99 % | HEIGHT: 60 IN | HEART RATE: 68 BPM | DIASTOLIC BLOOD PRESSURE: 66 MMHG | SYSTOLIC BLOOD PRESSURE: 124 MMHG | BODY MASS INDEX: 34 KG/M2

## 2021-10-12 DIAGNOSIS — E10.9 TYPE 1 DIABETES MELLITUS WITHOUT COMPLICATION (HCC): ICD-10-CM

## 2021-10-12 DIAGNOSIS — E78.5 HYPERLIPIDEMIA, UNSPECIFIED HYPERLIPIDEMIA TYPE: Primary | ICD-10-CM

## 2021-10-12 DIAGNOSIS — I10 PRIMARY HYPERTENSION: ICD-10-CM

## 2021-10-12 DIAGNOSIS — E03.9 PRIMARY HYPOTHYROIDISM: ICD-10-CM

## 2021-10-12 DIAGNOSIS — M81.0 OSTEOPOROSIS, UNSPECIFIED OSTEOPOROSIS TYPE, UNSPECIFIED PATHOLOGICAL FRACTURE PRESENCE: ICD-10-CM

## 2021-10-12 DIAGNOSIS — E55.9 VITAMIN D DEFICIENCY: ICD-10-CM

## 2021-10-12 PROCEDURE — 95251 CONT GLUC MNTR ANALYSIS I&R: CPT | Performed by: INTERNAL MEDICINE

## 2021-10-12 PROCEDURE — 99215 OFFICE O/P EST HI 40 MIN: CPT | Performed by: INTERNAL MEDICINE

## 2021-11-24 RX ORDER — INSULIN GLARGINE 100 [IU]/ML
INJECTION, SOLUTION SUBCUTANEOUS
Qty: 15 ML | Refills: 1 | Status: SHIPPED | OUTPATIENT
Start: 2021-11-24 | End: 2021-12-02 | Stop reason: SDUPTHER

## 2021-12-02 RX ORDER — INSULIN GLARGINE 100 [IU]/ML
INJECTION, SOLUTION SUBCUTANEOUS
Qty: 45 ML | Refills: 1 | Status: SHIPPED | OUTPATIENT
Start: 2021-12-02 | End: 2021-12-07 | Stop reason: SDUPTHER

## 2021-12-07 RX ORDER — INSULIN GLARGINE 100 [IU]/ML
INJECTION, SOLUTION SUBCUTANEOUS
Qty: 45 ML | Refills: 1 | Status: SHIPPED | OUTPATIENT
Start: 2021-12-07

## 2021-12-31 LAB
BASOPHILS # BLD AUTO: 0.1 X10E3/UL (ref 0–0.2)
BASOPHILS NFR BLD AUTO: 2 %
BUN SERPL-MCNC: 13 MG/DL (ref 8–27)
BUN/CREAT SERPL: 9 (ref 12–28)
CALCIUM SERPL-MCNC: 9.3 MG/DL (ref 8.7–10.3)
CHLORIDE SERPL-SCNC: 105 MMOL/L (ref 96–106)
CO2 SERPL-SCNC: 23 MMOL/L (ref 20–29)
CREAT SERPL-MCNC: 1.5 MG/DL (ref 0.57–1)
EOSINOPHIL # BLD AUTO: 0.4 X10E3/UL (ref 0–0.4)
EOSINOPHIL NFR BLD AUTO: 7 %
ERYTHROCYTE [DISTWIDTH] IN BLOOD BY AUTOMATED COUNT: 12.1 % (ref 11.7–15.4)
GLUCOSE SERPL-MCNC: 241 MG/DL (ref 65–99)
HBA1C MFR BLD: 8.5 % (ref 4.8–5.6)
HCT VFR BLD AUTO: 36.2 % (ref 34–46.6)
HGB BLD-MCNC: 12.2 G/DL (ref 11.1–15.9)
IMM GRANULOCYTES # BLD AUTO: 0 X10E3/UL (ref 0–0.1)
IMM GRANULOCYTES NFR BLD AUTO: 1 %
LYMPHOCYTES # BLD AUTO: 1.2 X10E3/UL (ref 0.7–3.1)
LYMPHOCYTES NFR BLD AUTO: 24 %
MCH RBC QN AUTO: 32.4 PG (ref 26.6–33)
MCHC RBC AUTO-ENTMCNC: 33.7 G/DL (ref 31.5–35.7)
MCV RBC AUTO: 96 FL (ref 79–97)
MONOCYTES # BLD AUTO: 0.6 X10E3/UL (ref 0.1–0.9)
MONOCYTES NFR BLD AUTO: 12 %
NEUTROPHILS # BLD AUTO: 2.8 X10E3/UL (ref 1.4–7)
NEUTROPHILS NFR BLD AUTO: 54 %
PLATELET # BLD AUTO: 278 X10E3/UL (ref 150–450)
POTASSIUM SERPL-SCNC: 4.5 MMOL/L (ref 3.5–5.2)
RBC # BLD AUTO: 3.77 X10E6/UL (ref 3.77–5.28)
REPORT: NORMAL
SODIUM SERPL-SCNC: 140 MMOL/L (ref 134–144)
WBC # BLD AUTO: 5 X10E3/UL (ref 3.4–10.8)

## 2022-01-13 ENCOUNTER — OFFICE VISIT (OUTPATIENT)
Dept: ENDOCRINOLOGY | Age: 67
End: 2022-01-13

## 2022-01-13 VITALS
WEIGHT: 173.2 LBS | BODY MASS INDEX: 32.7 KG/M2 | SYSTOLIC BLOOD PRESSURE: 117 MMHG | HEART RATE: 62 BPM | HEIGHT: 61 IN | DIASTOLIC BLOOD PRESSURE: 57 MMHG | OXYGEN SATURATION: 98 %

## 2022-01-13 DIAGNOSIS — E78.5 HYPERLIPIDEMIA, UNSPECIFIED HYPERLIPIDEMIA TYPE: ICD-10-CM

## 2022-01-13 DIAGNOSIS — E55.9 VITAMIN D DEFICIENCY: ICD-10-CM

## 2022-01-13 DIAGNOSIS — N18.30 BENIGN HYPERTENSION WITH CKD (CHRONIC KIDNEY DISEASE) STAGE III: ICD-10-CM

## 2022-01-13 DIAGNOSIS — E03.9 PRIMARY HYPOTHYROIDISM: ICD-10-CM

## 2022-01-13 DIAGNOSIS — I12.9 BENIGN HYPERTENSION WITH CKD (CHRONIC KIDNEY DISEASE) STAGE III: ICD-10-CM

## 2022-01-13 DIAGNOSIS — E10.65 UNCONTROLLED TYPE 1 DIABETES MELLITUS WITH HYPERGLYCEMIA: Primary | ICD-10-CM

## 2022-01-13 PROCEDURE — 95251 CONT GLUC MNTR ANALYSIS I&R: CPT | Performed by: NURSE PRACTITIONER

## 2022-01-13 PROCEDURE — 99214 OFFICE O/P EST MOD 30 MIN: CPT | Performed by: NURSE PRACTITIONER

## 2022-01-13 NOTE — PROGRESS NOTES
Chief Complaint  Chief Complaint   Patient presents with   • Diabetes   • Hypothyroidism   • Hyperlipidemia       Subjective          History of Present Illness    Dayanna Lewis 66 y.o. presents for a follow-up evaluation for type 1 DM    She has been diabetic for more than 40 years and started insulin about 35 years ago    She started on insulin pump in 2011    Pt is on the following medications for their DM: Apidria via Medtronic 670 pump with a Dexcom 6 sensor.    Pump Settings    Basal : 00:00 - 1.50              03:00 - 1.10              08:00 - 1.70              15:00 - 1.95    Carb ratio: 1 to 10    Sensitivty : 0:00 - 50                    14:00 - 48      Target : 110-130          Denies diarrhea or constipation, difficulty breathing,chest pain, shortness of breath, vision changes, numbness and tingling in feet/hands.    Weight stable    Pt does not have a history of DM retinopathy.  Last eye exam was Dec 2021    Pt does have a history of nephropathy.  Patient is currently taking ARB.  Follows with Dr. Medhat Walker    Pt does not have neuropathy.      Pt does not have a history of CAD or CVA.    Last A1C in 12/30/21 was 8.5    Last microalbumin in 09/21 was normal      Blood Sugars    Blood glucoses are checked via descom.    Fasting blood glucoses: 200s    Pre-meal blood glucoses: 200s    Pt states that she has had a couple episodes of hypoglycemia, mostly after meals due to too much insulin given.      Pt states that she feels bad when her BG is 140s    Patient is physical active and uses temp basal to help prevent lows while exercising.      Sensor Data    Time in range 33%  High 39%  Very high 27%  Low <1%  Very low <1%      Average Glucose - 210 mg/dL      Sensor Wear - 93 %           Hypothyroid    PT complains of dry skin and heat intolerance.    Denies fatigue, weight changes, constipation, diarrhea, hair loss, dry skin, chest pain, palpitations, heat intolerance or cold intolerance.    Current  "treatment is Synthroid 50 mcg daily    Last labs in 09/21 showed TSH 1.620, FT4 1.32 and FT3 2.5          Hyperlipidemia     Pt denies any muscle/body aches, chest pain, or shortness of breath    Pt is currently taking Crestor 10 mg HS    Last lipid panel in 09/21 showed Total 131, Triglycerides 82, HDL 60 and LDL 55          Hypertension with CKD Stage 3    Pt denies any chest pain, palpitations, shortness of breath, or headache    Current regimen includes Bystolic 10 mg daily and losartan 25 mg daily          Vitamin D Deficiency    Current treatment includes 50,000 units once a week    Last Vit D level was 58.2 in 03/21             I have reviewed the patient's allergies, medicines, past medical hx, family hx and social hx.    Objective   Vital Signs:   /57   Pulse 62   Ht 154.9 cm (61\")   Wt 78.6 kg (173 lb 3.2 oz)   LMP  (LMP Unknown)   SpO2 98%   BMI 32.73 kg/m²       Physical Exam   Physical Exam  Constitutional:       General: She is not in acute distress.     Appearance: Normal appearance. She is not diaphoretic.   HENT:      Head: Normocephalic and atraumatic.   Eyes:      General:         Right eye: No discharge.         Left eye: No discharge.   Cardiovascular:      Rate and Rhythm: Normal rate and regular rhythm.      Heart sounds: Normal heart sounds. No murmur heard.  No friction rub. No gallop.    Pulmonary:      Effort: Pulmonary effort is normal. No respiratory distress.      Breath sounds: Normal breath sounds. No wheezing.   Skin:     General: Skin is warm and dry.   Neurological:      Mental Status: She is alert.   Psychiatric:         Mood and Affect: Mood normal.         Behavior: Behavior normal.                    Results Review:   Hemoglobin A1C   Date Value Ref Range Status   12/30/2021 8.5 (H) 4.8 - 5.6 % Final     Comment:              Prediabetes: 5.7 - 6.4           Diabetes: >6.4           Glycemic control for adults with diabetes: <7.0       Triglycerides   Date Value Ref " Range Status   09/23/2021 82 0 - 150 mg/dL Final     HDL Cholesterol   Date Value Ref Range Status   09/23/2021 60 40 - 60 mg/dL Final     LDL Chol Calc (NIH)   Date Value Ref Range Status   09/23/2021 55 0 - 100 mg/dL Final     VLDL Cholesterol [place]   Date Value Ref Range Status   09/23/2021 16 5 - 40 mg/dL Final         Assessment and Plan {CC Problem List  Visit Diagnosis  ROS  Review (Popup)  Health Maintenance  Quality  BestPractice  Medications  SmartSets  SnapShot Encounters  Media :23  Diagnoses and all orders for this visit:    1. Uncontrolled type 1 diabetes mellitus with hyperglycemia (HCC) (Primary)  -     Comprehensive Metabolic Panel; Future  -     Hemoglobin A1c; Future    Continue with Apidria via [place] [employer] 670 pump  Changed Basal rates noted below  Continue with dexcom  Take bolus prior to eating, not after and if still going low then can change carb ratio  Continue with exercise and using temp basal to prevent lows  Check labs prior to next visit       Basal : 00:00 - 1.60              03:00 - 1.20              08:00 - 1.80              15:00 - 2.05      2. Primary hypothyroidism  -     TSH; Future  -     T4, Free; Future    Continue with Synthroid 50 mcg daily  Check labs prior to next visit       3. Hyperlipidemia, unspecified hyperlipidemia type  -     Comprehensive Metabolic Panel; Future  -     Lipid Panel; Future    Continue with Statin  Check labs prior to next visit       4. Benign hypertension with CKD (chronic kidney disease) stage III (HCC)  -     Comprehensive Metabolic Panel; Future    Stable  Continue with current medication regimen  Defer management to nephrology      5. Vitamin D deficiency  -     Vitamin D 25 Hydroxy; Future    Continue with supplement  Check labs prior to next visit          No refills needed at this time      Keep appointment with Dr. Stokes on 4/28/2022 with labs prior      Follow Up     Patient was given instructions and counseling regarding her  condition or for health maintenance advice. Please see specific information pulled into the AVS if appropriate.              GABRIELA Mejia  01/13/22

## 2022-03-03 RX ORDER — INSULIN GLULISINE 100 [IU]/ML
INJECTION, SOLUTION SUBCUTANEOUS
Qty: 140 ML | Refills: 1 | Status: SHIPPED | OUTPATIENT
Start: 2022-03-03 | End: 2023-03-27

## 2022-03-03 RX ORDER — LEVOTHYROXINE SODIUM 50 MCG
TABLET ORAL
Qty: 90 TABLET | Refills: 1 | Status: SHIPPED | OUTPATIENT
Start: 2022-03-03 | End: 2022-09-16

## 2022-03-14 ENCOUNTER — TELEPHONE (OUTPATIENT)
Dept: ENDOCRINOLOGY | Age: 67
End: 2022-03-14

## 2022-04-27 NOTE — PROGRESS NOTES
Subjective   Dayanna Lewis is a 66 y.o. female.     F/u  for dm 1, hypothyroidism, dyslipidemia / testing bs 3 x day / last dm eye exam 12/2/21 / last  dm foot exam today with dr Stokes        Patient is a 66-year-old female who came in for follow-up.     She has known diabetes mellitus for more than 40 years and started on insulin about 35 years ago. She started on insulin pump in 2011. SUSAN antibodies is negative and C-peptide has been undetectable. She is using a Medtronic 670 pump with a Dexcom 6 sensor. She is using Apidra insulin on the pump.  Hemoglobin A1c done in April 2022 is 8.0%.    Basal rates:  12 AM-1.6.    3 AM- 1.2.    8 AM- 1.8.    3 PM- 2.05.    Carbohydrate ratio: 10    Insulin sensitivity: 12 AM-50.  2 PM-48.  Target: 110-130.  Active insulin time 3 hours.    Sensor data reviewed  Average glucose 165 mg per DL.  Time in target 59%.  Time above target 38%.  Time below target 2%.   She had 1 episode of hypoglycemia in the early a.m. at around 3 AM.     Her last eye examination was in December 2021 done by Dr. Lentz. She has no retinopathy. Urine microalbumin was normal in September 2021. She denies numbness, tingling or burning in her hands or feet.     She has osteoporosis and was on an unknown medication for 5 years. She was taken off Evista prescribed by Dr. Martines in 2021. Her last bone density was many years ago.  Dr. Walker will switch her from ergocalciferol 50,000 units weekly to vitamin D3 1000 units/day.  25 hydroxy vitamin D done on April 14, 2022 is normal at 62.9 ng/mL.     She has hypothyroidism and is on Synthroid 50 mcg/day. She has no history of goiter or head/neck radiation therapy. She has gained 3 pounds since October 2021.. TSH done in April 2022 is normal at 1.76 with a normal free T4 at 1.16 ng per DL.     She has hyperlipidemia and is on Crestor 10 mg/day. She denies myalgia. Lipid panel done April 2022 are as follows: Cholesterol 147.  HDL 54.  LDL 70.  Triglycerides  "130.     She has hypertension and is on Bystolic 10 mg/day and losartan 25 mg/day. She has no history of heart attack or stroke. She denies chest pain or shortness of breath.     The following portions of the patient's history were reviewed and updated as appropriate: allergies, current medications, past family history, past medical history, past social history, past surgical history and problem list.    Review of Systems   Eyes: Negative for visual disturbance.   Respiratory: Negative for shortness of breath.    Cardiovascular: Negative for chest pain and palpitations.   Genitourinary: Negative.    Musculoskeletal: Negative for myalgias.   Neurological: Negative for numbness.     Objective      Vitals:    04/28/22 1200   BP: 134/48   Pulse: 63   SpO2: 97%   Weight: 79.9 kg (176 lb 3.2 oz)   Height: 154.9 cm (60.98\")     Physical Exam  Constitutional:       General: She is not in acute distress.     Appearance: Normal appearance. She is obese. She is not ill-appearing, toxic-appearing or diaphoretic.   Eyes:      General: No scleral icterus.        Right eye: No discharge.         Left eye: No discharge.   Neck:      Vascular: No carotid bruit.   Cardiovascular:      Rate and Rhythm: Normal rate and regular rhythm.      Pulses: Normal pulses.      Heart sounds: Normal heart sounds.   Pulmonary:      Effort: Pulmonary effort is normal.      Breath sounds: Normal breath sounds.   Abdominal:      General: There is no distension.      Palpations: Abdomen is soft. There is no mass.   Musculoskeletal:         General: No swelling or tenderness. Normal range of motion.      Cervical back: Normal range of motion and neck supple. No rigidity or tenderness.   Lymphadenopathy:      Cervical: No cervical adenopathy.   Neurological:      General: No focal deficit present.      Mental Status: She is alert and oriented to person, place, and time.   Psychiatric:         Mood and Affect: Mood normal.         Behavior: Behavior " normal.       Results Encounter on 04/13/2022   Component Date Value Ref Range Status   • Glucose 04/14/2022 232 (A) 65 - 99 mg/dL Final   • BUN 04/14/2022 17  8 - 27 mg/dL Final   • Creatinine 04/14/2022 1.42 (A) 0.57 - 1.00 mg/dL Final   • EGFR Result 04/14/2022 41 (A) >59 mL/min/1.73 Final   • BUN/Creatinine Ratio 04/14/2022 12  12 - 28 Final   • Sodium 04/14/2022 142  134 - 144 mmol/L Final   • Potassium 04/14/2022 4.5  3.5 - 5.2 mmol/L Final   • Chloride 04/14/2022 106  96 - 106 mmol/L Final   • Total CO2 04/14/2022 21  20 - 29 mmol/L Final   • Calcium 04/14/2022 9.0  8.7 - 10.3 mg/dL Final   • Total Protein 04/14/2022 6.3  6.0 - 8.5 g/dL Final   • Albumin 04/14/2022 4.1  3.8 - 4.8 g/dL Final   • Globulin 04/14/2022 2.2  1.5 - 4.5 g/dL Final   • A/G Ratio 04/14/2022 1.9  1.2 - 2.2 Final   • Total Bilirubin 04/14/2022 0.3  0.0 - 1.2 mg/dL Final   • Alkaline Phosphatase 04/14/2022 121  44 - 121 IU/L Final   • AST (SGOT) 04/14/2022 18  0 - 40 IU/L Final   • ALT (SGPT) 04/14/2022 13  0 - 32 IU/L Final   • Total Cholesterol 04/14/2022 147  100 - 199 mg/dL Final   • Triglycerides 04/14/2022 130  0 - 149 mg/dL Final   • HDL Cholesterol 04/14/2022 54  >39 mg/dL Final   • VLDL Cholesterol Galen 04/14/2022 23  5 - 40 mg/dL Final   • LDL Chol Calc (NIH) 04/14/2022 70  0 - 99 mg/dL Final   • Hemoglobin A1C 04/14/2022 8.0 (A) 4.8 - 5.6 % Final    Comment:          Prediabetes: 5.7 - 6.4           Diabetes: >6.4           Glycemic control for adults with diabetes: <7.0     • TSH 04/14/2022 1.760  0.450 - 4.500 uIU/mL Final   • Free T4 04/14/2022 1.16  0.82 - 1.77 ng/dL Final   • 25 Hydroxy, Vitamin D 04/14/2022 62.9  30.0 - 100.0 ng/mL Final    Comment: Vitamin D deficiency has been defined by the [place] of  [employer] and an Endocrine Society practice guideline as a  level of serum 25-OH vitamin D less than 20 ng/mL (1,2).  The Endocrine Society went on to further define vitamin D  insufficiency as a level between 21 and 29  ng/mL (2).  1. IOM ([place] of [employer]). 2010. Dietary reference     intakes for calcium and D. [place]: The     National Academies Press.  2. Tereso MF, Benedicto MCCAIN, Melinda TOUSSAINT, et al.     Evaluation, treatment, and prevention of vitamin D     deficiency: an Endocrine Society clinical practice     guideline. JCEM. 2011 Jul; 96(7):1911-30.     • Interpretation 04/14/2022 Note   Final    Comment: Medical Director's Note: Effective 2/28/2022, [facility] uses  the 2021 CKD-EPI creatinine equation without a race factor  to calculate and report eGFR results. eGFR results reported  prior to this date using the 2009 CKD-EPI equation are no  longer included in this report interpretation.  -------------------------------  CHRONIC KIDNEY DISEASE:  EGFR, BLOOD PRESSURE, AND PROTEINURIA ASSESSMENT  We presume eGFR has been less than 60 mL/min/1.73mE2 on at  least two occasions spaced at least 3 months apart. Current  eGFR is 41 mL/min/1.73mE2 corresponding to CKD stage 3b.  Potassium is within goal and has not changed significantly,  was 4.5 and now is 4.5 mmol/L. Glycemic control (HB A1c: 8.0  Previous urine protein measurement was normal.  EGFR, BLOOD PRESSURE, AND PROTEINURIA TREATMENT SUGGESTIONS  -  Based upon current eGFR, patient is at high risk for adverse  outcomes such as CKD progression, CVD, and mortality.  Guidelines recommend a target blood pressure of 120/80 mmHg  or less in C                           KD patients to reduce cardiovascular risk and  CKD progression.  EGFR, BLOOD PRESSURE, AND PROTEINURIA FOLLOW-UP  -  Hemoglobin A1C and fasting Renal Panel within 3 months;  BONE and MINERAL ASSESSMENT  Calcium is within goal and has not changed significantly,  was 9.3 and now is 9.0 mg/dL. Carbon Dioxide is below goal  and has decreased, was 23 and now is 21 mmol/L. Vitamin D is  adequate (was 58.2 and now is 62.9 ng/mL).  BONE and MINERAL TREATMENT SUGGESTIONS  -  Interpretations require  simultaneous measurements of serum  calcium and phosphorus. If not on alkali, begin sodium  bicarbonate, one 650 mg pill 2-3 times daily, otherwise  increase dose.  BONE and MINERAL FOLLOW-UP  -  25-Hydroxy Vitamin D within 12 months; fasting Renal Panel  within 3 months; fasting PTH with Renal Panel is due;  LIPIDS ASSESSMENT  Blood was non-fasting; interpret these results with caution.  LDL-C is normal and has risen, was 55 and now is 70 mg/dL.  Triglyceride is normal and has risen, was 82 and now                            is 130  mg/dL. Non-HDL Cholesterol is optimal and has risen, was 71  and now is 93 mg/dL. HDL-C is normal and has decreased, was  60 and now is 54 mg/dL.  LIPIDS TREATMENT SUGGESTIONS  -  Therapeutic lifestyle changes are always valuable to  maintain optimal blood lipid status (diet, exercise, weight  management). If at least a 50% LDL reduction from baseline  has not been achieved, begin or increase statin. Consider  measurement of LDL particle number or Apo B to adjudicate  need for further LDL lowering therapy. If statin cannot be  tolerated or increased, alternatives include use of an  intestinal agent (ezetimibe or bile acid sequestrant) or  niacin.  LIPIDS FOLLOW-UP  -  fasting Lipid Panel within 12 months;  ANEMIA ASSESSMENT  Most recent order does not include a CBC Panel or iron  studies.  ANEMIA FOLLOW-UP  -  CBC within 9 months;  -------------------------------  DISCLAIMER  These assessments and treatment suggestions are provided as  a convenience in support of the physician-                           patient  relationship and are not intended to replace the physician's  clinical judgment. They are derived from national guidelines  in addition to other evidence and expert opinion. The  clinician should consider this information within the  context of clinical opinion and the individual patient.  SEE GUIDANCE FOR CHRONIC KIDNEY DISEASE PROGRAM: Kidney  Disease Improving Global Outcomes  (KDIGO) clinical practice  guidelines are at http://kdigo.org/home/guidelines/.  National Kidney Foundation Kidney Disease Outcomes Quality  Initiative (KDOQI (TM)), with its limitations and  disclaimers, are at www.kidney.org/professionals/KDOQI. This  program is intended for patients who have been diagnosed  with stages 3, 4, or pre-dialysis 5 CKD. It is not intended  for children, pregnant patients, or transplant patients.     • Interpretation 04/14/2022 Note   Final    Supplemental report is available.     Assessment/Plan   Diagnoses and all orders for this visit:    1. Type 1 diabetes mellitus without complication (HCC) (Primary)    2. Hyperlipidemia, unspecified hyperlipidemia type    3. Benign hypertension with CKD (chronic kidney disease) stage III (HCC)    4. Vitamin D deficiency    5. Osteoporosis, unspecified osteoporosis type, unspecified pathological fracture presence    6. Primary hypothyroidism    7. Insulin pump status      Decrease basal rate at 12 AM to 3 AM to 1.5 units/h.  Keep other insulin pump settings.  Continue ergocalciferol per Dr. Walker.  Continue Crestor 10 mg/day.  Continue Bystolic and losartan.    Copy of my note sent to Dr. Ellis and Dr. Walker.    RTC 3 mos with GHADA Trejo NP.

## 2022-04-28 ENCOUNTER — OFFICE VISIT (OUTPATIENT)
Dept: ENDOCRINOLOGY | Age: 67
End: 2022-04-28

## 2022-04-28 VITALS
OXYGEN SATURATION: 97 % | HEIGHT: 61 IN | WEIGHT: 176.2 LBS | SYSTOLIC BLOOD PRESSURE: 134 MMHG | BODY MASS INDEX: 33.27 KG/M2 | HEART RATE: 63 BPM | DIASTOLIC BLOOD PRESSURE: 48 MMHG

## 2022-04-28 DIAGNOSIS — M81.0 OSTEOPOROSIS, UNSPECIFIED OSTEOPOROSIS TYPE, UNSPECIFIED PATHOLOGICAL FRACTURE PRESENCE: ICD-10-CM

## 2022-04-28 DIAGNOSIS — E55.9 VITAMIN D DEFICIENCY: ICD-10-CM

## 2022-04-28 DIAGNOSIS — E78.5 HYPERLIPIDEMIA, UNSPECIFIED HYPERLIPIDEMIA TYPE: ICD-10-CM

## 2022-04-28 DIAGNOSIS — I12.9 BENIGN HYPERTENSION WITH CKD (CHRONIC KIDNEY DISEASE) STAGE III: ICD-10-CM

## 2022-04-28 DIAGNOSIS — Z96.41 INSULIN PUMP STATUS: ICD-10-CM

## 2022-04-28 DIAGNOSIS — E10.9 TYPE 1 DIABETES MELLITUS WITHOUT COMPLICATION: Primary | ICD-10-CM

## 2022-04-28 DIAGNOSIS — N18.30 BENIGN HYPERTENSION WITH CKD (CHRONIC KIDNEY DISEASE) STAGE III: ICD-10-CM

## 2022-04-28 DIAGNOSIS — E03.9 PRIMARY HYPOTHYROIDISM: ICD-10-CM

## 2022-04-28 PROCEDURE — 95251 CONT GLUC MNTR ANALYSIS I&R: CPT | Performed by: INTERNAL MEDICINE

## 2022-04-28 PROCEDURE — 99214 OFFICE O/P EST MOD 30 MIN: CPT | Performed by: INTERNAL MEDICINE

## 2022-06-24 ENCOUNTER — TELEPHONE (OUTPATIENT)
Dept: ENDOCRINOLOGY | Age: 67
End: 2022-06-24

## 2022-06-24 DIAGNOSIS — E10.9 TYPE 1 DIABETES MELLITUS WITHOUT COMPLICATION: ICD-10-CM

## 2022-06-24 DIAGNOSIS — E03.9 PRIMARY HYPOTHYROIDISM: ICD-10-CM

## 2022-06-24 DIAGNOSIS — E78.5 HYPERLIPIDEMIA, UNSPECIFIED HYPERLIPIDEMIA TYPE: Primary | ICD-10-CM

## 2022-07-20 RX ORDER — ROSUVASTATIN CALCIUM 10 MG/1
TABLET, COATED ORAL
Qty: 90 TABLET | Refills: 3 | Status: SHIPPED | OUTPATIENT
Start: 2022-07-20

## 2022-07-25 NOTE — PROGRESS NOTES
Chief Complaint  Chief Complaint   Patient presents with   • Diabetes       Subjective          History of Present Illness    Dayanna Lewis 66 y.o. presents for a follow-up evaluation for type 1 DM     She has been diabetic for more than 40 years and started insulin about 35 years ago     She started on insulin pump in 2011     Pt is on the following medications for their DM: Apidria via Medtronic 670 pump with a Dexcom 6 sensor.       Pump Settings     Basal : 00:00 - 1.50              03:00 - 1.20              08:00 - 1.80              15:00 - 2.05       Carb ratio: 1 to 10       Sensitivty : 0:00 - 50                    14:00 - 48          Target : 110-130        Denies diarrhea, constipation, chest pain, shortness of breath, vision changes, numbness and tingling in feet/hands.    Weight stable since last visit.    Pt does not have a history of DM retinopathy.  Last eye exam was Dec 2021     Pt does have a history of nephropathy.  Patient is currently taking ARB.  Follows with Dr. Medhat Walker     Pt does not have neuropathy.       Pt does not have a history of CAD or CVA.    Last A1C in 07/22 was 7.9    Last microalbumin in 07/22 was negative          Walking 30 mins a day every day except Saturday and Sunday          Blood Sugars    Blood glucoses are checked via dexcom.    Fasting blood glucoses: mid 100s high 100s    Pre-meal blood glucoses:     Pt has had some episodes of hypoglycemia after lunch and dinner          Sensor Data    Time in range 48%  High 32%  Very high 18%  Low 1%  Very low <1%      Average Glucose - 186 mg/dL      Sensor Wear - 93 %              Hypothyroid    Denies fatigue, weight changes, constipation, diarrhea, hair loss, dry skin, chest pain, palpitations or cold intolerance.    Current treatment is branded Synthroid 50 mcg daily    Last labs in 07/22 showed TSH 2.640 and FT4 1.33            Hyperlipidemia     Pt denies any muscle/body aches, chest pain, or shortness of breath    Pt  "is currently taking Crestor 10 mg HS    Last lipid panel in 07/22 showed Total 135, Triglycerides 102, HDL 50 and LDL 66            Hypertension with CKD Stage 3b    Pt denies any chest pain, palpitations, shortness of breath, or headache    Current regimen includes Bystolic 10 mg daily and losartan 25 mg daily          Vitamin D Deficiency    Current treatment includes 50,000 units once a week    Last Vit D level was 62.9 in 04/22               I have reviewed the patient's allergies, medicines, past medical hx, family hx and social hx.    Objective   Vital Signs:   /52   Pulse 75   Temp 97.2 °F (36.2 °C) (Temporal)   Ht 152.4 cm (60\")   Wt 78.8 kg (173 lb 12.8 oz)   LMP  (LMP Unknown)   SpO2 98%   BMI 33.94 kg/m²       Physical Exam   Physical Exam  Constitutional:       General: She is not in acute distress.     Appearance: Normal appearance. She is not diaphoretic.   HENT:      Head: Normocephalic and atraumatic.   Eyes:      General:         Right eye: No discharge.         Left eye: No discharge.   Abdominal:       Skin:     General: Skin is warm and dry.   Neurological:      Mental Status: She is alert.   Psychiatric:         Mood and Affect: Mood normal.         Behavior: Behavior normal.                    Results Review:   Hemoglobin A1C   Date Value Ref Range Status   07/14/2022 7.9 (H) 4.8 - 5.6 % Final     Comment:              Prediabetes: 5.7 - 6.4           Diabetes: >6.4           Glycemic control for adults with diabetes: <7.0       Triglycerides   Date Value Ref Range Status   07/14/2022 102 0 - 149 mg/dL Final     HDL Cholesterol   Date Value Ref Range Status   07/14/2022 50 >39 mg/dL Final     LDL Chol Calc (NIH)   Date Value Ref Range Status   07/14/2022 66 0 - 99 mg/dL Final     VLDL Cholesterol Galen   Date Value Ref Range Status   07/14/2022 19 5 - 40 mg/dL Final         Assessment and Plan {CC Problem List  Visit Diagnosis  ROS  Review (Popup)  Health Maintenance  Quality  " BestPractice  Medications  SmartSets  SnapShot Encounters  Media :23  Diagnoses and all orders for this visit:    1. Type 1 diabetes mellitus with stage 3b chronic kidney disease (HCC) (Primary)  -     Hemoglobin A1c; Future  -     Comprehensive Metabolic Panel; Future    A1C of 7.9%, not much change  Continue with Apidria via Medtronic 670 pump   Basal : 00:00 - 1.60              03:00 - 1.30              08:00 - 1.80              15:00 - 2.05       Carb ratio: 12 am 1 to 12         1 pm 1 to 10    Continue with Dexcom 6 sensor - pt doesn't want to change to Medtronic sensor at this time  Check labs prior to next visit      2. Primary hypothyroidism  -     TSH; Future  -     T4, Free; Future    Thyroid labs are good  Continue with branded Synthroid 50 mcg daily  Check labs prior to next visit        3. Hyperlipidemia, unspecified hyperlipidemia type  -     Comprehensive Metabolic Panel; Future  -     Lipid Panel; Future    Lipid panel is good  Continue with statin  Check labs prior to next visit        4. Hypertensive kidney disease with stage 3b chronic kidney disease (HCC)  -     Comprehensive Metabolic Panel; Future    Stable  Continue with current medication regimen  Defer management to PCP      5. Vitamin D deficiency  -     Vitamin D 25 Hydroxy; Future      Continue with supplement  Check labs prior to next visit        6. Skin infection  -     sulfamethoxazole-trimethoprim (Bactrim DS) 800-160 MG per tablet; Take 1 tablet by mouth 2 (Two) Times a Day.  Dispense: 20 tablet; Refill: 0       If not better follow up with PCP            No refills needed at this time        RTC on 10/28/22 with Dr. Stokes and labs 10/14/22      Follow Up     Patient was given instructions and counseling regarding her condition or for health maintenance advice. Please see specific information pulled into the AVS if appropriate.              Silke Trejo, GABRIELA  07/28/22

## 2022-07-28 ENCOUNTER — OFFICE VISIT (OUTPATIENT)
Dept: ENDOCRINOLOGY | Age: 67
End: 2022-07-28

## 2022-07-28 VITALS
DIASTOLIC BLOOD PRESSURE: 52 MMHG | WEIGHT: 173.8 LBS | OXYGEN SATURATION: 98 % | HEIGHT: 60 IN | BODY MASS INDEX: 34.12 KG/M2 | TEMPERATURE: 97.2 F | SYSTOLIC BLOOD PRESSURE: 126 MMHG | HEART RATE: 75 BPM

## 2022-07-28 DIAGNOSIS — E78.5 HYPERLIPIDEMIA, UNSPECIFIED HYPERLIPIDEMIA TYPE: ICD-10-CM

## 2022-07-28 DIAGNOSIS — L08.9 SKIN INFECTION: ICD-10-CM

## 2022-07-28 DIAGNOSIS — E55.9 VITAMIN D DEFICIENCY: ICD-10-CM

## 2022-07-28 DIAGNOSIS — E10.22 TYPE 1 DIABETES MELLITUS WITH STAGE 3B CHRONIC KIDNEY DISEASE: Primary | ICD-10-CM

## 2022-07-28 DIAGNOSIS — N18.32 TYPE 1 DIABETES MELLITUS WITH STAGE 3B CHRONIC KIDNEY DISEASE: Primary | ICD-10-CM

## 2022-07-28 DIAGNOSIS — N18.32 HYPERTENSIVE KIDNEY DISEASE WITH STAGE 3B CHRONIC KIDNEY DISEASE: ICD-10-CM

## 2022-07-28 DIAGNOSIS — E03.9 PRIMARY HYPOTHYROIDISM: ICD-10-CM

## 2022-07-28 DIAGNOSIS — I12.9 HYPERTENSIVE KIDNEY DISEASE WITH STAGE 3B CHRONIC KIDNEY DISEASE: ICD-10-CM

## 2022-07-28 PROCEDURE — 99214 OFFICE O/P EST MOD 30 MIN: CPT | Performed by: NURSE PRACTITIONER

## 2022-07-28 PROCEDURE — 95251 CONT GLUC MNTR ANALYSIS I&R: CPT | Performed by: NURSE PRACTITIONER

## 2022-07-28 RX ORDER — SULFAMETHOXAZOLE AND TRIMETHOPRIM 800; 160 MG/1; MG/1
1 TABLET ORAL 2 TIMES DAILY
Qty: 20 TABLET | Refills: 0 | Status: SHIPPED | OUTPATIENT
Start: 2022-07-28 | End: 2022-10-28

## 2022-07-28 RX ORDER — PRENATAL 168/IRON/FOLIC/OMEGA3 27-800-235
1 CAPSULE ORAL DAILY
COMMUNITY

## 2022-07-29 ENCOUNTER — TELEPHONE (OUTPATIENT)
Dept: OBSTETRICS AND GYNECOLOGY | Facility: CLINIC | Age: 67
End: 2022-07-29

## 2022-07-29 DIAGNOSIS — Z13.820 SCREENING FOR OSTEOPOROSIS: ICD-10-CM

## 2022-07-29 DIAGNOSIS — N95.1 FEMALE CLIMACTERIC STATE: Primary | ICD-10-CM

## 2022-07-29 DIAGNOSIS — N95.8 OTHER SPECIFIED MENOPAUSAL AND PERIMENOPAUSAL DISORDERS: ICD-10-CM

## 2022-07-29 NOTE — TELEPHONE ENCOUNTER
Pt requesting order for Dexa scan be faxed to [facility] on [address]  Pt will call to schedule.      Pt # [phone]

## 2022-08-08 DIAGNOSIS — N95.1 FEMALE CLIMACTERIC STATE: ICD-10-CM

## 2022-08-08 DIAGNOSIS — Z13.820 SCREENING FOR OSTEOPOROSIS: ICD-10-CM

## 2022-08-08 DIAGNOSIS — N95.8 OTHER SPECIFIED MENOPAUSAL AND PERIMENOPAUSAL DISORDERS: ICD-10-CM

## 2022-08-09 NOTE — PROGRESS NOTES
Yanira, she did DEXA for us recently. Her Overall assessment is osteopenia (thin, but reasonable for age). Her FRAX using that data is 16% and 2.2% so under the thresholds for treatment. Given these findings would not suggest anything more than she is currently doing and encourage her to follow up and consider repeat in 2 years or so.  Please let her know. Thanks, Dr. Arredondo

## 2022-08-11 ENCOUNTER — TELEPHONE (OUTPATIENT)
Dept: OBSTETRICS AND GYNECOLOGY | Facility: CLINIC | Age: 67
End: 2022-08-11

## 2022-08-11 NOTE — TELEPHONE ENCOUNTER
"Dionicio Doyle,  Pt is aware of results \"Yanira, she did DEXA for us recently. Her Overall assessment is osteopenia (thin, but reasonable for age). Her FRAX using that data is 16% and 2.2% so under the thresholds for treatment. Given these findings would not suggest anything more than she is currently doing and encourage her to follow up and consider repeat in 2 years or so.  Please let her know. Thanks, Dr. Arredondo\" but would like a callback to explain them a little more.    Thank you,  Arleth"

## 2022-08-11 NOTE — TELEPHONE ENCOUNTER
----- Message from Yanria Munson MA sent at 8/10/2022  4:26 PM EDT -----  L/m for pt/bria  ----- Message -----  From: Inocente Arredondo MD  Sent: 8/9/2022   8:59 AM EDT  To: IBETH Salas, she did DEXA for us recently. Her Overall assessment is osteopenia (thin, but reasonable for age). Her FRAX using that data is 16% and 2.2% so under the thresholds for treatment. Given these findings would not suggest anything more than she is currently doing and encourage her to follow up and consider repeat in 2 years or so.  Please let her know. Thanks, Dr. Arredondo

## 2022-08-15 ENCOUNTER — TELEPHONE (OUTPATIENT)
Dept: OBSTETRICS AND GYNECOLOGY | Facility: CLINIC | Age: 67
End: 2022-08-15

## 2022-08-15 NOTE — TELEPHONE ENCOUNTER
"Pt states she has been waiting on a return call since Thursday.  Your note from 8/11/22 states \"pt aware\".  Sorry unsure what she is needing.  Please call her back at your earliest convenience.     Pt # [phone]  "

## 2022-09-06 ENCOUNTER — APPOINTMENT (OUTPATIENT)
Dept: WOMENS IMAGING | Facility: HOSPITAL | Age: 67
End: 2022-09-06

## 2022-09-06 PROCEDURE — 77067 SCR MAMMO BI INCL CAD: CPT | Performed by: RADIOLOGY

## 2022-09-06 PROCEDURE — 77063 BREAST TOMOSYNTHESIS BI: CPT | Performed by: RADIOLOGY

## 2022-09-16 RX ORDER — LEVOTHYROXINE SODIUM 50 MCG
TABLET ORAL
Qty: 90 TABLET | Refills: 1 | Status: SHIPPED | OUTPATIENT
Start: 2022-09-16

## 2022-10-28 ENCOUNTER — OFFICE VISIT (OUTPATIENT)
Dept: ENDOCRINOLOGY | Age: 67
End: 2022-10-28

## 2022-10-28 VITALS
SYSTOLIC BLOOD PRESSURE: 136 MMHG | WEIGHT: 178 LBS | DIASTOLIC BLOOD PRESSURE: 84 MMHG | BODY MASS INDEX: 34.95 KG/M2 | HEART RATE: 74 BPM | OXYGEN SATURATION: 97 % | HEIGHT: 60 IN | TEMPERATURE: 97.7 F

## 2022-10-28 DIAGNOSIS — E78.5 HYPERLIPIDEMIA, UNSPECIFIED HYPERLIPIDEMIA TYPE: ICD-10-CM

## 2022-10-28 DIAGNOSIS — I12.9 HYPERTENSIVE KIDNEY DISEASE WITH STAGE 3B CHRONIC KIDNEY DISEASE: ICD-10-CM

## 2022-10-28 DIAGNOSIS — E03.9 PRIMARY HYPOTHYROIDISM: ICD-10-CM

## 2022-10-28 DIAGNOSIS — N18.32 HYPERTENSIVE KIDNEY DISEASE WITH STAGE 3B CHRONIC KIDNEY DISEASE: ICD-10-CM

## 2022-10-28 DIAGNOSIS — N18.32 TYPE 1 DIABETES MELLITUS WITH STAGE 3B CHRONIC KIDNEY DISEASE: Primary | ICD-10-CM

## 2022-10-28 DIAGNOSIS — E55.9 VITAMIN D DEFICIENCY: ICD-10-CM

## 2022-10-28 DIAGNOSIS — E10.22 TYPE 1 DIABETES MELLITUS WITH STAGE 3B CHRONIC KIDNEY DISEASE: Primary | ICD-10-CM

## 2022-10-28 PROCEDURE — 99214 OFFICE O/P EST MOD 30 MIN: CPT | Performed by: NURSE PRACTITIONER

## 2022-10-28 PROCEDURE — 95251 CONT GLUC MNTR ANALYSIS I&R: CPT | Performed by: NURSE PRACTITIONER

## 2022-10-28 RX ORDER — LANCETS 30 GAUGE
EACH MISCELLANEOUS
Qty: 500 EACH | Refills: 4 | Status: SHIPPED | OUTPATIENT
Start: 2022-10-28 | End: 2022-10-31 | Stop reason: SDUPTHER

## 2022-10-28 NOTE — PATIENT INSTRUCTIONS
Use temp basal 30 mins prior to exercise and set it for total time of time before and time of exercise.

## 2022-10-28 NOTE — PROGRESS NOTES
Chief Complaint  Chief Complaint   Patient presents with   • Diabetes     Type 1: Pt pump is attached, is up to date on eye exam, no hx of retinopathy or neuropathy.        Subjective          History of Present Illness    Dayanna Lewis 67 y.o. presents for a follow-up evaluation for type 1 DM     She has been diabetic for more than 40 years and started insulin about 35 years ago     She started on insulin pump in 2011     Pt is on the following medications for their DM: Apidria via [employer] 670 pump with a Dexcom 6 sensor.        Pump Settings     Basal : 00:00 - 1.60              03:00 - 1.20              08:00 - 1.80              15:00 - 2.05        Carb ratio:  1 to 10                Sensitivty : 0:00 - 50                    14:00 - 48           Target : 110-130      Active Insulin Time: 3 hours        Denies diarrhea, constipation, chest pain, shortness of breath, vision changes or numbness and tingling in feet/hands.    Weight gain of 5 lbs since last visit.    Pt does not have a history of DM retinopathy.  Last eye exam was Dec 2021     Pt does have a history of nephropathy.  Patient is currently taking ARB.  Follows with Dr. Medhat Walker     Pt does not have neuropathy.       Pt does not have a history of CAD or CVA.    Last A1C in 10/22 was 8.3    Last microalbumin in 07/22 was negative          Blood Sugars    Blood glucoses are checked via Dexcom.    Fasting blood glucoses: high 100s - 200s    Pre-meal blood glucoses: 60s - 200s    Pt has had episodes of hypoglycemia, usually between lunch and dinner due to long time period between          Sensor Data    Time in range 55%  High 33%  Very high 9%  Low 2%  Very low <1%      Average Glucose - 169 mg/dL      Sensor Wear - 93 %            Hypothyroid    Pt complains of dry skin (no change)    Denies weight changes, constipation, diarrhea, hair loss, chest pain, palpitations or cold intolerance.    Current treatment is branded Synthroid 50 mcg daily    Last  "labs in 10/22 showed TSH 2.730 and FT4 1.38          Hyperlipidemia     Pt denies any muscle/body aches, chest pain, or shortness of breath    Pt is currently taking Crestor 10 mg HS    Last lipid panel in 10/22 showed Total 136, Triglycerides 83, HDL 59 and LDL 61            Hypertension with CKD Stage 3b     Pt denies any chest pain, palpitations, shortness of breath, or headache     Current regimen includes Bystolic 10 mg daily and losartan 25 mg daily            Vitamin D Deficiency     Current treatment includes 50,000 units once a week     Last Vit D level was 52.1 in 10/22              I have reviewed the patient's allergies, medicines, past medical hx, family hx and social hx.    Objective   Vital Signs:   /84   Pulse 74   Temp 97.7 °F (36.5 °C) (Temporal)   Ht 152.4 cm (60\")   Wt 80.7 kg (178 lb)   LMP  (LMP Unknown)   SpO2 97%   BMI 34.76 kg/m²       Physical Exam   Physical Exam  Constitutional:       General: She is not in acute distress.     Appearance: Normal appearance. She is not diaphoretic.   HENT:      Head: Normocephalic and atraumatic.   Eyes:      General:         Right eye: No discharge.         Left eye: No discharge.   Skin:     General: Skin is warm and dry.   Neurological:      Mental Status: She is alert.   Psychiatric:         Mood and Affect: Mood normal.         Behavior: Behavior normal.                    Results Review:   Hemoglobin A1C   Date Value Ref Range Status   10/14/2022 8.30 (H) 4.80 - 5.60 % Final     Comment:     Hemoglobin A1C Ranges:  Increased Risk for Diabetes  5.7% to 6.4%  Diabetes                     >= 6.5%  Diabetic Goal                < 7.0%       Triglycerides   Date Value Ref Range Status   10/14/2022 83 0 - 150 mg/dL Final     HDL Cholesterol   Date Value Ref Range Status   10/14/2022 59 40 - 60 mg/dL Final     LDL Chol Calc (NIH)   Date Value Ref Range Status   10/14/2022 61 0 - 100 mg/dL Final     VLDL Cholesterol Galen   Date Value Ref Range " Status   10/14/2022 16 5 - 40 mg/dL Final         Assessment and Plan {CC Problem List  Visit Diagnosis  ROS  Review (Popup)  Health Maintenance  Quality  BestPractice  Medications  SmartSets  SnapShot Encounters  Media :23  Diagnoses and all orders for this visit:    1. Type 1 diabetes mellitus with stage 3b chronic kidney disease (HCC) (Primary)  -     Lancets misc; Dispense based on insurance preference: Check 4-6 times a day. Dx: E 10.22  Dispense: 500 each; Refill: 4  -     Hemoglobin A1c; Future  -     Comprehensive Metabolic Panel; Future    A1C is higher at 8%  Continue with Apidria via Medtronic 670 pump with follow changes  Basal : 00:00 - 1.70              03:00 - 1.30              08:00 - 1.80              15:00 - 2.05    Sensitivty : 0:00 - 48                    14:00 - 46    Continue with Dexcom, but is changing over to Medtronic sensor  Use temp basal 30 mins prior to exercise and set it for total time of time before and time of exercise.        2. Primary hypothyroidism  -     TSH; Future  -     T4, Free; Future    Thyroid labs are good  Continue with branded Synthroid 50 mcg daily      3. Hyperlipidemia, unspecified hyperlipidemia type  -     Comprehensive Metabolic Panel; Future  -     Lipid Panel; Future    Lipid panel is good  Continue with statin      4. Hypertensive kidney disease with stage 3b chronic kidney disease (HCC)  -     Comprehensive Metabolic Panel; Future    Stable  Continue with current medication regimen  Defer management to PCP/nephrology        5. Vitamin D deficiency    Vitamin D levels are good  Continue with supplement            RTC in 3 months with me, labs prior and 6 months with Dr. Stokes      Follow Up     Patient was given instructions and counseling regarding her condition or for health maintenance advice. Please see specific information pulled into the AVS if appropriate.              Silke Trejo, APRN  10/28/22

## 2022-10-31 DIAGNOSIS — N18.32 TYPE 1 DIABETES MELLITUS WITH STAGE 3B CHRONIC KIDNEY DISEASE: ICD-10-CM

## 2022-10-31 DIAGNOSIS — E10.22 TYPE 1 DIABETES MELLITUS WITH STAGE 3B CHRONIC KIDNEY DISEASE: ICD-10-CM

## 2022-10-31 RX ORDER — LANCETS 30 GAUGE
EACH MISCELLANEOUS
Qty: 500 EACH | Refills: 4 | Status: SHIPPED | OUTPATIENT
Start: 2022-10-31

## 2022-10-31 RX ORDER — LANCETS 30 GAUGE
EACH MISCELLANEOUS
Qty: 500 EACH | Refills: 4 | Status: SHIPPED | OUTPATIENT
Start: 2022-10-31 | End: 2022-10-31 | Stop reason: SDUPTHER

## 2023-01-12 ENCOUNTER — LAB (OUTPATIENT)
Dept: ENDOCRINOLOGY | Age: 68
End: 2023-01-12
Payer: MEDICARE

## 2023-01-12 DIAGNOSIS — E10.22 TYPE 1 DIABETES MELLITUS WITH STAGE 3B CHRONIC KIDNEY DISEASE: ICD-10-CM

## 2023-01-12 DIAGNOSIS — N18.32 HYPERTENSIVE KIDNEY DISEASE WITH STAGE 3B CHRONIC KIDNEY DISEASE: ICD-10-CM

## 2023-01-12 DIAGNOSIS — E03.9 PRIMARY HYPOTHYROIDISM: ICD-10-CM

## 2023-01-12 DIAGNOSIS — I12.9 HYPERTENSIVE KIDNEY DISEASE WITH STAGE 3B CHRONIC KIDNEY DISEASE: ICD-10-CM

## 2023-01-12 DIAGNOSIS — N18.32 TYPE 1 DIABETES MELLITUS WITH STAGE 3B CHRONIC KIDNEY DISEASE: ICD-10-CM

## 2023-01-12 DIAGNOSIS — E78.5 HYPERLIPIDEMIA, UNSPECIFIED HYPERLIPIDEMIA TYPE: ICD-10-CM

## 2023-01-12 LAB
ALBUMIN SERPL-MCNC: 4 G/DL (ref 3.5–5.2)
ALBUMIN/GLOB SERPL: 2 G/DL
ALP SERPL-CCNC: 113 U/L (ref 39–117)
ALT SERPL-CCNC: 14 U/L (ref 1–33)
AST SERPL-CCNC: 17 U/L (ref 1–32)
BILIRUB SERPL-MCNC: 0.4 MG/DL (ref 0–1.2)
BUN SERPL-MCNC: 13 MG/DL (ref 8–23)
BUN/CREAT SERPL: 9.5 (ref 7–25)
CALCIUM SERPL-MCNC: 9.4 MG/DL (ref 8.6–10.5)
CHLORIDE SERPL-SCNC: 106 MMOL/L (ref 98–107)
CHOLEST SERPL-MCNC: 142 MG/DL (ref 0–200)
CO2 SERPL-SCNC: 27.6 MMOL/L (ref 22–29)
CREAT SERPL-MCNC: 1.37 MG/DL (ref 0.57–1)
EGFRCR SERPLBLD CKD-EPI 2021: 42.7 ML/MIN/1.73
GLOBULIN SER CALC-MCNC: 2 GM/DL
GLUCOSE SERPL-MCNC: 219 MG/DL (ref 65–99)
HBA1C MFR BLD: 7.8 % (ref 4.8–5.6)
HDLC SERPL-MCNC: 51 MG/DL (ref 40–60)
IMP & REVIEW OF LAB RESULTS: NORMAL
LDLC SERPL CALC-MCNC: 56 MG/DL (ref 0–100)
POTASSIUM SERPL-SCNC: 4.6 MMOL/L (ref 3.5–5.2)
PROT SERPL-MCNC: 6 G/DL (ref 6–8.5)
REPORT: NORMAL
SODIUM SERPL-SCNC: 141 MMOL/L (ref 136–145)
T4 FREE SERPL-MCNC: 1.41 NG/DL (ref 0.93–1.7)
TRIGL SERPL-MCNC: 217 MG/DL (ref 0–150)
TSH SERPL DL<=0.005 MIU/L-ACNC: 2.05 UIU/ML (ref 0.27–4.2)
VLDLC SERPL CALC-MCNC: 35 MG/DL (ref 5–40)

## 2023-01-26 ENCOUNTER — OFFICE VISIT (OUTPATIENT)
Dept: ENDOCRINOLOGY | Age: 68
End: 2023-01-26
Payer: MEDICARE

## 2023-01-26 VITALS
TEMPERATURE: 97.7 F | HEIGHT: 60 IN | OXYGEN SATURATION: 99 % | BODY MASS INDEX: 34.91 KG/M2 | DIASTOLIC BLOOD PRESSURE: 90 MMHG | SYSTOLIC BLOOD PRESSURE: 140 MMHG | HEART RATE: 66 BPM | WEIGHT: 177.8 LBS

## 2023-01-26 DIAGNOSIS — I12.9 HYPERTENSIVE KIDNEY DISEASE WITH STAGE 3B CHRONIC KIDNEY DISEASE: ICD-10-CM

## 2023-01-26 DIAGNOSIS — E78.5 HYPERLIPIDEMIA, UNSPECIFIED HYPERLIPIDEMIA TYPE: ICD-10-CM

## 2023-01-26 DIAGNOSIS — N18.32 TYPE 1 DIABETES MELLITUS WITH STAGE 3B CHRONIC KIDNEY DISEASE: Primary | ICD-10-CM

## 2023-01-26 DIAGNOSIS — N18.32 HYPERTENSIVE KIDNEY DISEASE WITH STAGE 3B CHRONIC KIDNEY DISEASE: ICD-10-CM

## 2023-01-26 DIAGNOSIS — E10.22 TYPE 1 DIABETES MELLITUS WITH STAGE 3B CHRONIC KIDNEY DISEASE: Primary | ICD-10-CM

## 2023-01-26 DIAGNOSIS — E03.9 PRIMARY HYPOTHYROIDISM: ICD-10-CM

## 2023-01-26 DIAGNOSIS — E55.9 VITAMIN D DEFICIENCY: ICD-10-CM

## 2023-01-26 PROCEDURE — 3051F HG A1C>EQUAL 7.0%<8.0%: CPT | Performed by: NURSE PRACTITIONER

## 2023-01-26 PROCEDURE — 99214 OFFICE O/P EST MOD 30 MIN: CPT | Performed by: NURSE PRACTITIONER

## 2023-01-26 PROCEDURE — 95251 CONT GLUC MNTR ANALYSIS I&R: CPT | Performed by: NURSE PRACTITIONER

## 2023-01-26 NOTE — PROGRESS NOTES
Chief Complaint  Chief Complaint   Patient presents with   • Diabetes     Type 1: Pt pump is attached, is up to date on eye exam, no hx of retinopathy or neuropathy.        Subjective          History of Present Illness    Dayanna Lewis 67 y.o. presents for a follow-up evaluation for type 1 DM     She has been diabetic for more than 40 years and started insulin about 35 years ago     She started on insulin pump in 2011     Pt is on the following medications for their DM: Apidria via [employer] 670 pump with sensor        Pump Settings     Basal : 00:00 - 1.70              03:00 - 1.30              08:00 - 1.80              15:00 - 2.05        Carb ratio:  1 to 10           1300- 000 1 to 11                           Sensitivty : 0:00 - 42                              Target : 110-130        Active Insulin Time: 3 hours        Denies diarrhea, constipation, chest pain, shortness of breath, vision changes or numbness and tingling in feet/hands.    Weight stable since last visit.    Pt does not have a history of DM retinopathy.  Last eye exam was Dec 2022     Pt does have a history of nephropathy.  Patient is currently taking ARB.  Follows with Dr. Medhat Walker     Pt does not have neuropathy.       Pt does not have a history of CAD or CVA.    Last A1C in 01/23 was 7.8    Last microalbumin in 07/22 was negative          Blood Sugars    Blood glucoses are checked 4/daily    Fasting blood glucoses: low to high 100s    Pre-meal blood glucoses: 60s - 200s    Pt has rare episodes of hypoglycemia.          Sensor Data    Time in range 70%  High 24%  Very high 5%  Low 1%  Very low 0%      Average Glucose - 182 mg/dL      Sensor Wear - 93 %    Time in Auto Mode - 98 %  Time in Manual Mode - 2 %              Hypothyroid    Pt complains of dry skin (no change)    Denies constipation, diarrhea, hair loss, chest pain, palpitations, heat intolerance or cold intolerance.    Current treatment is branded Synthroid 50 mcg  "daily    Last labs in 01/23 showed TSH 2.050 and FT4 1.41            Hyperlipidemia     Pt denies any muscle/body aches, chest pain, or shortness of breath    Pt is currently taking Crestor 10 mg HS    Last lipid panel in 01/23 showed Total 142, Triglycerides 217, HDL 51 and LDL 56            Hypertension with CKD Stage 3b     Pt denies any chest pain, palpitations, shortness of breath, or headache     Current regimen includes Bystolic 10 mg daily and losartan 25 mg daily                 Vitamin D Deficiency     Current treatment includes 50,000 units once a week     Last Vit D level was 52.1 in 10/22           I have reviewed the patient's allergies, medicines, past medical hx, family hx and social hx.    Objective   Vital Signs:   /90   Pulse 66   Temp 97.7 °F (36.5 °C) (Temporal)   Ht 152.4 cm (60\")   Wt 80.6 kg (177 lb 12.8 oz)   LMP  (LMP Unknown)   SpO2 99%   BMI 34.72 kg/m²       Physical Exam   Physical Exam  Constitutional:       General: She is not in acute distress.     Appearance: Normal appearance. She is not diaphoretic.   HENT:      Head: Normocephalic and atraumatic.   Eyes:      General:         Right eye: No discharge.         Left eye: No discharge.   Skin:     General: Skin is warm and dry.   Neurological:      Mental Status: She is alert.   Psychiatric:         Mood and Affect: Mood normal.         Behavior: Behavior normal.                    Results Review:   Hemoglobin A1C   Date Value Ref Range Status   01/12/2023 7.80 (H) 4.80 - 5.60 % Final     Comment:     Hemoglobin A1C Ranges:  Increased Risk for Diabetes  5.7% to 6.4%  Diabetes                     >= 6.5%  Diabetic Goal                < 7.0%       Triglycerides   Date Value Ref Range Status   01/12/2023 217 (H) 0 - 150 mg/dL Final     HDL Cholesterol   Date Value Ref Range Status   01/12/2023 51 40 - 60 mg/dL Final     LDL Chol Calc (NIH)   Date Value Ref Range Status   01/12/2023 56 0 - 100 mg/dL Final     VLDL " Cholesterol Galen   Date Value Ref Range Status   01/12/2023 35 5 - 40 mg/dL Final         Assessment and Plan {CC Problem List  Visit Diagnosis  ROS  Review (Popup)  Health Maintenance  Quality  BestPractice  Medications  SmartSets  SnapShot Encounters  Media :23  Diagnoses and all orders for this visit:    1. Type 1 diabetes mellitus with stage 3b chronic kidney disease (HCC) (Primary)  -     Hemoglobin A1c; Future  -     Comprehensive Metabolic Panel; Future  -     Microalbumin / Creatinine Urine Ratio - Urine, Clean Catch; Future    A1C is better at 7.8%  Carb ratio:  0000- 1700 is 1 to 10           1300- 000 1 to 11  Needs to bolus before eating breakfast        2. Primary hypothyroidism  -     TSH; Future    Thyroid labs are good.    Continue with branded Synthroid 50 mcg daily        3. Hyperlipidemia, unspecified hyperlipidemia type  -     Comprehensive Metabolic Panel; Future  -     Lipid Panel; Future    Total cholesterol and LDL are normal, continue with statin.    Triglycerides are slightly elevated, decrease dietary fat.        4. Hypertensive kidney disease with stage 3b chronic kidney disease (HCC)  -     Comprehensive Metabolic Panel; Future    Stable  Continue with current medication regimen  Defer management to PCP        5. Vitamin D deficiency  -     Vitamin D,25-Hydroxy; Future     Continue with supplement          No refills needed at this time        RTC on 04/19/23 and labs on 04/12/23        Follow Up     Patient was given instructions and counseling regarding her condition or for health maintenance advice. Please see specific information pulled into the AVS if appropriate.              Silke Trejo, GABRIELA  01/26/23

## 2023-02-22 ENCOUNTER — OFFICE VISIT (OUTPATIENT)
Dept: SURGERY | Facility: CLINIC | Age: 68
End: 2023-02-22
Payer: MEDICARE

## 2023-02-22 VITALS — HEIGHT: 60 IN | WEIGHT: 173.6 LBS | BODY MASS INDEX: 34.08 KG/M2

## 2023-02-22 DIAGNOSIS — K80.20 CALCULUS OF GALLBLADDER WITHOUT CHOLECYSTITIS WITHOUT OBSTRUCTION: Primary | ICD-10-CM

## 2023-02-22 PROCEDURE — 99204 OFFICE O/P NEW MOD 45 MIN: CPT | Performed by: PHYSICIAN ASSISTANT

## 2023-02-22 NOTE — PROGRESS NOTES
ASSESSMENT/PLAN:    This is a 67-year-old lady presenting with symptomatic cholelithiasis.  She understands the options and wishes to proceed with laparoscopic cholecystectomy.  They understand nature of the procedure and the risks including but not limited to bleeding, infection, conversion to open procedure, postoperative bile leak, and the bowel function changes which can accompany cholecystectomy.  All questions were answered at the time of this visit and they were willing to proceed with all recommendations.    CC:     Cholelithiasis    HPI:    This is a 67-year-old lady presenting to the office today at the request of Dr. Dangelo Ellis Jr. for consultation.  Approximately 1 month ago she had a 2-week stretch of off-and-on right upper quadrant abdominal pain that radiated straight through to her back.  During this period of time she also had an increase amount of reflux but denied having nausea, vomiting, belching or bloating.  As a result of her symptoms she was sent for a right upper quadrant ultrasound which demonstrated cholelithiasis.    ENDOSCOPY:   • Colonoscopy ; normal palpation Cologuard in last 3 years    RADIOLOGY:   • Right upper quadrant ultrasound: Cholelithiasis without signs of acute cholecystitis    SOCIAL HISTORY:   • Denies tobacco use  • Denies alcohol use    FAMILY HISTORY:    • Colorectal cancer: None  • Gallbladder Disease: Sister    PREVIOUS ABDOMINAL SURGERY    • None    OTHER SURGERY  Past Surgical History:   Procedure Laterality Date   •  SECTION     • COLONOSCOPY     • INNER EAR SURGERY     • KNEE SURGERY Right     for ACL repair   • OTHER SURGICAL HISTORY      EAR SURGERY       PAST MEDICAL HISTORY:    Past Medical History:   Diagnosis Date   • Chronic kidney disease     Stage 3   • Depression    • Dermatomyositis (HCC)     TYPE 2   • Diabetes (HCC) 2016   • Diabetes mellitus type 1, uncontrolled    • Disease of thyroid gland    • Dyslipidemia    •  Hyperlipidemia    • Hypertension    • Hypothyroidism    • Menopausal disorder    • Osteoporosis    • Polymyositis (HCC)    • Vitamin D deficiency        MEDICATIONS:     Current Outpatient Medications:   •  Apidra 100 UNIT/ML injection, USE UP  UNITS DAILY WITH PUMP AS DIRECTED - DISCARD VIAL 28 DAYS AFTER OPENING (Patient taking differently: Use up 100 units in pump daily), Disp: 140 mL, Rfl: 1  •  aspirin 81 MG tablet, Take 1 tablet by mouth daily., Disp: , Rfl:   •  buPROPion XL (WELLBUTRIN XL) 300 MG 24 hr tablet, TAKE 1 TABLET BY MOUTH EVERY MORNING, Disp: 90 tablet, Rfl: 0  •  Calcium-Phosphorus-Vitamin D (Citracal +D3) 250-107-500 MG-MG-UNIT chewable tablet, Chew., Disp: , Rfl:   •  famotidine (PEPCID) 40 MG tablet, Take 40 mg by mouth Daily., Disp: , Rfl:   •  folic acid (FOLVITE) 1 MG tablet, TAKE 1 TABLET BY MOUTH DAILY, Disp: 90 tablet, Rfl: 0  •  glucagon (GLUCAGEN) 1 MG injection, Use as directed, Disp: 2 kit, Rfl: 5  •  glucose blood test strip, Mari Contour Next Test In Vitro Strip; Patient Sig: Mari Contour Next Test In Vitro Strip test 8 times daily; 240; 5; 10-Nov-2014; Active, Disp: 800 each, Rfl: 0  •  Lancets misc, Dispense based on insurance preference: Check 4-6 times a day. Dx: E 10.22, Disp: 500 each, Rfl: 4  •  Lantus 100 UNIT/ML injection, 40  units subcutaneous daily, Disp: 45 mL, Rfl: 1  •  losartan (COZAAR) 25 MG tablet, Take 25 mg by mouth every night at bedtime., Disp: , Rfl:   •  Multiple Minerals-Vitamins (CITRACAL PLUS BONE DENSITY) tablet, Take by mouth., Disp: , Rfl:   •  Probiotic Product (PROBIOTIC-10 PO), Take 1 tablet/day by mouth., Disp: , Rfl:   •  rosuvastatin (CRESTOR) 10 MG tablet, TAKE 1 TABLET BY MOUTH EVERY NIGHT AT BEDTIME, Disp: 90 tablet, Rfl: 3  •  Synthroid 50 MCG tablet, TAKE 1 TABLET EVERY DAY, Disp: 90 tablet, Rfl: 1  •  vitamin D (ERGOCALCIFEROL) 1.25 MG (90611 UT) capsule capsule, TAKE 1 CAPSULE BY MOUTH EVERY 7 DAYS., Disp: 13 capsule, Rfl: 3  •   "vitamin E 400 UNIT capsule, Take 1 capsule by mouth daily., Disp: , Rfl:   •  BYSTOLIC 10 MG tablet, Take 1 tablet by mouth Daily., Disp: 90 tablet, Rfl: 3    ALLERGIES:   No Known Allergies    PHYSICAL EXAM:   • Constitutional: Well-developed well-nourished, no acute distress  • Vital signs:   o Height 60\"  o Weight 173  o BMI 33.9  • Neck: Supple, no palpable mass, trachea midline  • Respiratory: Clear to auscultation, normal inspiratory effort  • Cardiovascular: Regular rate, no murmur, no carotid bruit  • Gastrointestinal: Soft, nontender  • Psychiatric: Alert and oriented ×3, normal affect         Juan Carlos Martinez PA-C    "

## 2023-03-03 ENCOUNTER — PRE-ADMISSION TESTING (OUTPATIENT)
Dept: PREADMISSION TESTING | Facility: HOSPITAL | Age: 68
End: 2023-03-03
Payer: MEDICARE

## 2023-03-03 VITALS
WEIGHT: 173 LBS | SYSTOLIC BLOOD PRESSURE: 120 MMHG | HEIGHT: 60 IN | TEMPERATURE: 98.5 F | RESPIRATION RATE: 18 BRPM | DIASTOLIC BLOOD PRESSURE: 64 MMHG | HEART RATE: 70 BPM | BODY MASS INDEX: 33.96 KG/M2 | OXYGEN SATURATION: 100 %

## 2023-03-03 LAB
ANION GAP SERPL CALCULATED.3IONS-SCNC: 5.9 MMOL/L (ref 5–15)
BUN SERPL-MCNC: 16 MG/DL (ref 8–23)
BUN/CREAT SERPL: 12.8 (ref 7–25)
CALCIUM SPEC-SCNC: 9.7 MG/DL (ref 8.6–10.5)
CHLORIDE SERPL-SCNC: 104 MMOL/L (ref 98–107)
CO2 SERPL-SCNC: 28.1 MMOL/L (ref 22–29)
CREAT SERPL-MCNC: 1.25 MG/DL (ref 0.57–1)
DEPRECATED RDW RBC AUTO: 42.4 FL (ref 37–54)
EGFRCR SERPLBLD CKD-EPI 2021: 47.6 ML/MIN/1.73
ERYTHROCYTE [DISTWIDTH] IN BLOOD BY AUTOMATED COUNT: 12.2 % (ref 12.3–15.4)
GLUCOSE SERPL-MCNC: 159 MG/DL (ref 65–99)
HCT VFR BLD AUTO: 37.6 % (ref 34–46.6)
HGB BLD-MCNC: 12.8 G/DL (ref 12–15.9)
MCH RBC QN AUTO: 32.2 PG (ref 26.6–33)
MCHC RBC AUTO-ENTMCNC: 34 G/DL (ref 31.5–35.7)
MCV RBC AUTO: 94.5 FL (ref 79–97)
PLATELET # BLD AUTO: 246 10*3/MM3 (ref 140–450)
PMV BLD AUTO: 9 FL (ref 6–12)
POTASSIUM SERPL-SCNC: 4.8 MMOL/L (ref 3.5–5.2)
RBC # BLD AUTO: 3.98 10*6/MM3 (ref 3.77–5.28)
SODIUM SERPL-SCNC: 138 MMOL/L (ref 136–145)
WBC NRBC COR # BLD: 6.29 10*3/MM3 (ref 3.4–10.8)

## 2023-03-03 PROCEDURE — 93010 ELECTROCARDIOGRAM REPORT: CPT | Performed by: INTERNAL MEDICINE

## 2023-03-03 PROCEDURE — 36415 COLL VENOUS BLD VENIPUNCTURE: CPT

## 2023-03-03 PROCEDURE — 93005 ELECTROCARDIOGRAM TRACING: CPT

## 2023-03-03 PROCEDURE — 85027 COMPLETE CBC AUTOMATED: CPT

## 2023-03-03 PROCEDURE — 80048 BASIC METABOLIC PNL TOTAL CA: CPT

## 2023-03-03 RX ORDER — CHLORHEXIDINE GLUCONATE 500 MG/1
CLOTH TOPICAL TAKE AS DIRECTED
COMMUNITY
End: 2023-03-10 | Stop reason: HOSPADM

## 2023-03-03 NOTE — DISCHARGE INSTRUCTIONS
Take the following medications the morning of surgery: BYSTOLIC, SYNTHROID,  AND WELLBUTRIN  ARRIVE AT 9 AM ON 3/10/23    If you are on prescription narcotic pain medication to control your pain you may also take that medication the morning of surgery.    General Instructions:  Do not eat solid food after midnight the night before surgery.  You may drink clear liquids day of surgery but must stop at least one hour before your hospital arrival time.  It is beneficial for you to have a clear drink that contains carbohydrates the day of surgery.  We suggest a 12 to 20 ounce bottle of Gatorade or Powerade for non-diabetic patients or a 12 to 20 ounce bottle of G2 or Powerade Zero for diabetic patients. (Pediatric patients, are not advised to drink a 12 to 20 ounce carbohydrate drink)    Clear liquids are liquids you can see through.  Nothing red in color.     Plain water                               Sports drinks  Sodas                                   Gelatin (Jell-O)  Fruit juices without pulp such as white grape juice and apple juice  Popsicles that contain no fruit or yogurt  Tea or coffee (no cream or milk added)  Gatorade / Powerade  G2 / Powerade Zero    Infants may have breast milk up to four hours before surgery.  Infants drinking formula may drink formula up to six hours before surgery.   Patients who avoid smoking, chewing tobacco and alcohol for 4 weeks prior to surgery have a reduced risk of post-operative complications.  Quit smoking as many days before surgery as you can.  Do not smoke, use chewing tobacco or drink alcohol the day of surgery.   If applicable bring your C-PAP/ BI-PAP machine.  Bring any papers given to you in the doctor’s office.  Wear clean comfortable clothes.  Do not wear contact lenses, false eyelashes or make-up.  Bring a case for your glasses.   Bring crutches or walker if applicable.  Remove all piercings.  Leave jewelry and any other valuables at home.  Hair extensions with metal  clips must be removed prior to surgery.  The Pre-Admission Testing nurse will instruct you to bring medications if unable to obtain an accurate list in Pre-Admission Testing.        If you were given a blood bank ID arm band remember to bring it with you the day of surgery.    Preventing a Surgical Site Infection:  For 2 to 3 days before surgery, avoid shaving with a razor because the razor can irritate skin and make it easier to develop an infection.    Any areas of open skin can increase the risk of a post-operative wound infection by allowing bacteria to enter and travel throughout the body.  Notify your surgeon if you have any skin wounds / rashes even if it is not near the expected surgical site.  The area will need assessed to determine if surgery should be delayed until it is healed.  The night prior to surgery shower using a fresh bar of anti-bacterial soap (such as Dial) and clean washcloth.  Sleep in a clean bed with clean clothing.  Do not allow pets to sleep with you.  Shower on the morning of surgery using a fresh bar of anti-bacterial soap (such as Dial) and clean washcloth.  Dry with a clean towel and dress in clean clothing.  Ask your surgeon if you will be receiving antibiotics prior to surgery.  Make sure you, your family, and all healthcare providers clean their hands with soap and water or an alcohol based hand  before caring for you or your wound.    Day of surgery:  Your arrival time is approximately two hours before your scheduled surgery time.  Upon arrival, a Pre-op nurse and Anesthesiologist will review your health history, obtain vital signs, and answer questions you may have.  The only belongings needed at this time will be a list of your home medications and if applicable your C-PAP/BI-PAP machine.  A Pre-op nurse will start an IV and you may receive medication in preparation for surgery, including something to help you relax.     Please be aware that surgery does come with  discomfort.  We want to make every effort to control your discomfort so please discuss any uncontrolled symptoms with your nurse.   Your doctor will most likely have prescribed pain medications.      If you are going home after surgery you will receive individualized written care instructions before being discharged.  A responsible adult must drive you to and from the hospital on the day of your surgery and stay with you for 24 hours.  Discharge prescriptions can be filled by the hospital pharmacy during regular pharmacy hours.  If you are having surgery late in the day/evening your prescription may be e-prescribed to your pharmacy.  Please verify your pharmacy hours or chose a 24 hour pharmacy to avoid not having access to your prescription because your pharmacy has closed for the day.    If you are staying overnight following surgery, you will be transported to your hospital room following the recovery period.  [facility] has all private rooms.    If you have any questions please call Pre-Admission Testing at [phone].  Deductibles and co-payments are collected on the day of service. Please be prepared to pay the required co-pay, deductible or deposit on the day of service as defined by your plan.    Call your surgeon immediately if you experience any of the following symptoms:  Sore Throat  Shortness of Breath or difficulty breathing  Cough  Chills  Body soreness or muscle pain  Headache  Fever  New loss of taste or smell  Do not arrive for your surgery ill.  Your procedure will need to be rescheduled to another time.  You will need to call your physician before the day of surgery to avoid any unnecessary exposure to hospital staff as well as other patients.   CHLORHEXIDINE CLOTH INSTRUCTIONS  The morning of surgery follow these instructions using the Chlorhexidine cloths you've been given.  These steps reduce bacteria on the body.  Do not use the cloths near your eyes, ears mouth, genitalia  or on open wounds.  Throw the cloths away after use but do not try to flush them down a toilet.      Open and remove one cloth at a time from the package.    Leave the cloth unfolded and begin the bathing.  Massage the skin with the cloths using gentle pressure to remove bacteria.  Do not scrub harshly.   Follow the steps below with one 2% CHG cloth per area (6 total cloths).  One cloth for neck, shoulders and chest.  One cloth for both arms, hands, fingers and underarms (do underarms last).  One cloth for the abdomen followed by groin.  One cloth for right leg and foot including between the toes.  One cloth for left leg and foot including between the toes.  The last cloth is to be used for the back of the neck, back and buttocks.    Allow the CHG to air dry 3 minutes on the skin which will give it time to work and decrease the chance of irritation.  The skin may feel sticky until it is dry.  Do not rinse with water or any other liquid or you will lose the beneficial effects of the CHG.  If mild skin irritation occurs, do rinse the skin to remove the CHG.  Report this to the nurse at time of admission.  Do not apply lotions, creams, ointments, deodorants or perfumes after using the clothes. Dress in clean clothes before coming to the hospital.

## 2023-03-07 LAB — QT INTERVAL: 410 MS

## 2023-03-10 ENCOUNTER — ANESTHESIA EVENT (OUTPATIENT)
Dept: PERIOP | Facility: HOSPITAL | Age: 68
End: 2023-03-10
Payer: MEDICARE

## 2023-03-10 ENCOUNTER — ANESTHESIA (OUTPATIENT)
Dept: PERIOP | Facility: HOSPITAL | Age: 68
End: 2023-03-10
Payer: MEDICARE

## 2023-03-10 ENCOUNTER — APPOINTMENT (OUTPATIENT)
Dept: GENERAL RADIOLOGY | Facility: HOSPITAL | Age: 68
End: 2023-03-10
Payer: MEDICARE

## 2023-03-10 ENCOUNTER — HOSPITAL ENCOUNTER (OUTPATIENT)
Facility: HOSPITAL | Age: 68
Setting detail: HOSPITAL OUTPATIENT SURGERY
Discharge: HOME | End: 2023-03-10
Attending: SURGERY | Admitting: SURGERY
Payer: MEDICARE

## 2023-03-10 VITALS
RESPIRATION RATE: 16 BRPM | TEMPERATURE: 97 F | SYSTOLIC BLOOD PRESSURE: 113 MMHG | HEART RATE: 65 BPM | OXYGEN SATURATION: 100 % | DIASTOLIC BLOOD PRESSURE: 72 MMHG

## 2023-03-10 DIAGNOSIS — K80.20 CALCULUS OF GALLBLADDER WITHOUT CHOLECYSTITIS WITHOUT OBSTRUCTION: ICD-10-CM

## 2023-03-10 LAB
GLUCOSE BLDC GLUCOMTR-MCNC: 104 MG/DL (ref 70–130)
GLUCOSE BLDC GLUCOMTR-MCNC: 153 MG/DL (ref 70–130)

## 2023-03-10 PROCEDURE — 25510000001 IOPAMIDOL 61 % SOLUTION: Performed by: SURGERY

## 2023-03-10 PROCEDURE — 25010000002 PROPOFOL 10 MG/ML EMULSION: Performed by: NURSE ANESTHETIST, CERTIFIED REGISTERED

## 2023-03-10 PROCEDURE — 25010000002 DROPERIDOL PER 5 MG: Performed by: NURSE ANESTHETIST, CERTIFIED REGISTERED

## 2023-03-10 PROCEDURE — 25010000002 HYDROMORPHONE 1 MG/ML SOLUTION: Performed by: NURSE ANESTHETIST, CERTIFIED REGISTERED

## 2023-03-10 PROCEDURE — 25010000002 ONDANSETRON PER 1 MG: Performed by: NURSE ANESTHETIST, CERTIFIED REGISTERED

## 2023-03-10 PROCEDURE — 25010000002 FENTANYL CITRATE (PF) 100 MCG/2ML SOLUTION: Performed by: NURSE ANESTHETIST, CERTIFIED REGISTERED

## 2023-03-10 PROCEDURE — 47563 LAPARO CHOLECYSTECTOMY/GRAPH: CPT

## 2023-03-10 PROCEDURE — 25010000002 DEXAMETHASONE SODIUM PHOSPHATE 20 MG/5ML SOLUTION: Performed by: NURSE ANESTHETIST, CERTIFIED REGISTERED

## 2023-03-10 PROCEDURE — 88304 TISSUE EXAM BY PATHOLOGIST: CPT | Performed by: SURGERY

## 2023-03-10 PROCEDURE — 74300 X-RAY BILE DUCTS/PANCREAS: CPT

## 2023-03-10 PROCEDURE — 47563 LAPARO CHOLECYSTECTOMY/GRAPH: CPT | Performed by: SURGERY

## 2023-03-10 PROCEDURE — 82962 GLUCOSE BLOOD TEST: CPT

## 2023-03-10 DEVICE — CLIP LIGAT VASC HORIZON TI MD/LG GRN 6CT: Type: IMPLANTABLE DEVICE | Site: ABDOMEN | Status: FUNCTIONAL

## 2023-03-10 RX ORDER — ROCURONIUM BROMIDE 10 MG/ML
INJECTION, SOLUTION INTRAVENOUS AS NEEDED
Status: DISCONTINUED | OUTPATIENT
Start: 2023-03-10 | End: 2023-03-10 | Stop reason: SURG

## 2023-03-10 RX ORDER — ACETAMINOPHEN 325 MG/1
650 TABLET ORAL ONCE
Status: COMPLETED | OUTPATIENT
Start: 2023-03-10 | End: 2023-03-10

## 2023-03-10 RX ORDER — FENTANYL CITRATE 50 UG/ML
50 INJECTION, SOLUTION INTRAMUSCULAR; INTRAVENOUS
Status: DISCONTINUED | OUTPATIENT
Start: 2023-03-10 | End: 2023-03-10 | Stop reason: HOSPADM

## 2023-03-10 RX ORDER — BUPIVACAINE HYDROCHLORIDE AND EPINEPHRINE 5; 5 MG/ML; UG/ML
INJECTION, SOLUTION EPIDURAL; INTRACAUDAL; PERINEURAL AS NEEDED
Status: DISCONTINUED | OUTPATIENT
Start: 2023-03-10 | End: 2023-03-10 | Stop reason: HOSPADM

## 2023-03-10 RX ORDER — PROMETHAZINE HYDROCHLORIDE 25 MG/1
25 TABLET ORAL ONCE AS NEEDED
Status: DISCONTINUED | OUTPATIENT
Start: 2023-03-10 | End: 2023-03-10 | Stop reason: HOSPADM

## 2023-03-10 RX ORDER — SODIUM CHLORIDE 0.9 % (FLUSH) 0.9 %
3 SYRINGE (ML) INJECTION EVERY 12 HOURS SCHEDULED
Status: DISCONTINUED | OUTPATIENT
Start: 2023-03-10 | End: 2023-03-10 | Stop reason: HOSPADM

## 2023-03-10 RX ORDER — NALOXONE HCL 0.4 MG/ML
0.2 VIAL (ML) INJECTION AS NEEDED
Status: DISCONTINUED | OUTPATIENT
Start: 2023-03-10 | End: 2023-03-10 | Stop reason: HOSPADM

## 2023-03-10 RX ORDER — DIPHENHYDRAMINE HYDROCHLORIDE 50 MG/ML
12.5 INJECTION INTRAMUSCULAR; INTRAVENOUS
Status: DISCONTINUED | OUTPATIENT
Start: 2023-03-10 | End: 2023-03-10 | Stop reason: HOSPADM

## 2023-03-10 RX ORDER — FENTANYL CITRATE 50 UG/ML
INJECTION, SOLUTION INTRAMUSCULAR; INTRAVENOUS AS NEEDED
Status: DISCONTINUED | OUTPATIENT
Start: 2023-03-10 | End: 2023-03-10 | Stop reason: SURG

## 2023-03-10 RX ORDER — ONDANSETRON 2 MG/ML
4 INJECTION INTRAMUSCULAR; INTRAVENOUS ONCE AS NEEDED
Status: COMPLETED | OUTPATIENT
Start: 2023-03-10 | End: 2023-03-10

## 2023-03-10 RX ORDER — SODIUM CHLORIDE 9 MG/ML
INJECTION, SOLUTION INTRAVENOUS AS NEEDED
Status: DISCONTINUED | OUTPATIENT
Start: 2023-03-10 | End: 2023-03-10 | Stop reason: HOSPADM

## 2023-03-10 RX ORDER — PROPOFOL 10 MG/ML
VIAL (ML) INTRAVENOUS AS NEEDED
Status: DISCONTINUED | OUTPATIENT
Start: 2023-03-10 | End: 2023-03-10 | Stop reason: SURG

## 2023-03-10 RX ORDER — IPRATROPIUM BROMIDE AND ALBUTEROL SULFATE 2.5; .5 MG/3ML; MG/3ML
3 SOLUTION RESPIRATORY (INHALATION) ONCE AS NEEDED
Status: DISCONTINUED | OUTPATIENT
Start: 2023-03-10 | End: 2023-03-10 | Stop reason: HOSPADM

## 2023-03-10 RX ORDER — DROPERIDOL 2.5 MG/ML
0.62 INJECTION, SOLUTION INTRAMUSCULAR; INTRAVENOUS
Status: DISCONTINUED | OUTPATIENT
Start: 2023-03-10 | End: 2023-03-10 | Stop reason: HOSPADM

## 2023-03-10 RX ORDER — EPHEDRINE SULFATE 50 MG/ML
5 INJECTION, SOLUTION INTRAVENOUS ONCE AS NEEDED
Status: DISCONTINUED | OUTPATIENT
Start: 2023-03-10 | End: 2023-03-10 | Stop reason: HOSPADM

## 2023-03-10 RX ORDER — LIDOCAINE HYDROCHLORIDE 10 MG/ML
0.5 INJECTION, SOLUTION EPIDURAL; INFILTRATION; INTRACAUDAL; PERINEURAL ONCE AS NEEDED
Status: DISCONTINUED | OUTPATIENT
Start: 2023-03-10 | End: 2023-03-10 | Stop reason: HOSPADM

## 2023-03-10 RX ORDER — FAMOTIDINE 10 MG/ML
20 INJECTION, SOLUTION INTRAVENOUS ONCE
Status: COMPLETED | OUTPATIENT
Start: 2023-03-10 | End: 2023-03-10

## 2023-03-10 RX ORDER — HYDRALAZINE HYDROCHLORIDE 20 MG/ML
5 INJECTION INTRAMUSCULAR; INTRAVENOUS
Status: DISCONTINUED | OUTPATIENT
Start: 2023-03-10 | End: 2023-03-10 | Stop reason: HOSPADM

## 2023-03-10 RX ORDER — LIDOCAINE HYDROCHLORIDE 20 MG/ML
INJECTION, SOLUTION INTRAVENOUS AS NEEDED
Status: DISCONTINUED | OUTPATIENT
Start: 2023-03-10 | End: 2023-03-10 | Stop reason: SURG

## 2023-03-10 RX ORDER — ONDANSETRON 4 MG/1
4 TABLET, FILM COATED ORAL EVERY 6 HOURS PRN
Qty: 10 TABLET | Refills: 1 | Status: SHIPPED | OUTPATIENT
Start: 2023-03-10 | End: 2023-03-23

## 2023-03-10 RX ORDER — SODIUM CHLORIDE 0.9 % (FLUSH) 0.9 %
3-10 SYRINGE (ML) INJECTION AS NEEDED
Status: DISCONTINUED | OUTPATIENT
Start: 2023-03-10 | End: 2023-03-10 | Stop reason: HOSPADM

## 2023-03-10 RX ORDER — LABETALOL HYDROCHLORIDE 5 MG/ML
5 INJECTION, SOLUTION INTRAVENOUS
Status: DISCONTINUED | OUTPATIENT
Start: 2023-03-10 | End: 2023-03-10 | Stop reason: HOSPADM

## 2023-03-10 RX ORDER — OXYCODONE AND ACETAMINOPHEN 7.5; 325 MG/1; MG/1
1 TABLET ORAL EVERY 4 HOURS PRN
Status: DISCONTINUED | OUTPATIENT
Start: 2023-03-10 | End: 2023-03-10 | Stop reason: HOSPADM

## 2023-03-10 RX ORDER — EPHEDRINE SULFATE 50 MG/ML
INJECTION INTRAVENOUS AS NEEDED
Status: DISCONTINUED | OUTPATIENT
Start: 2023-03-10 | End: 2023-03-10 | Stop reason: SURG

## 2023-03-10 RX ORDER — SCOLOPAMINE TRANSDERMAL SYSTEM 1 MG/1
1 PATCH, EXTENDED RELEASE TRANSDERMAL ONCE
Status: DISCONTINUED | OUTPATIENT
Start: 2023-03-10 | End: 2023-03-10 | Stop reason: HOSPADM

## 2023-03-10 RX ORDER — HYDROCODONE BITARTRATE AND ACETAMINOPHEN 5; 325 MG/1; MG/1
1 TABLET ORAL EVERY 4 HOURS PRN
Qty: 10 TABLET | Refills: 0 | Status: SHIPPED | OUTPATIENT
Start: 2023-03-10 | End: 2023-03-23

## 2023-03-10 RX ORDER — MIDAZOLAM HYDROCHLORIDE 1 MG/ML
0.5 INJECTION INTRAMUSCULAR; INTRAVENOUS
Status: DISCONTINUED | OUTPATIENT
Start: 2023-03-10 | End: 2023-03-10 | Stop reason: HOSPADM

## 2023-03-10 RX ORDER — DEXAMETHASONE SODIUM PHOSPHATE 4 MG/ML
INJECTION, SOLUTION INTRA-ARTICULAR; INTRALESIONAL; INTRAMUSCULAR; INTRAVENOUS; SOFT TISSUE AS NEEDED
Status: DISCONTINUED | OUTPATIENT
Start: 2023-03-10 | End: 2023-03-10 | Stop reason: SURG

## 2023-03-10 RX ORDER — MAGNESIUM HYDROXIDE 1200 MG/15ML
LIQUID ORAL AS NEEDED
Status: DISCONTINUED | OUTPATIENT
Start: 2023-03-10 | End: 2023-03-10 | Stop reason: HOSPADM

## 2023-03-10 RX ORDER — HYDROCODONE BITARTRATE AND ACETAMINOPHEN 7.5; 325 MG/1; MG/1
1 TABLET ORAL ONCE AS NEEDED
Status: DISCONTINUED | OUTPATIENT
Start: 2023-03-10 | End: 2023-03-10 | Stop reason: HOSPADM

## 2023-03-10 RX ORDER — HYDROMORPHONE HYDROCHLORIDE 1 MG/ML
0.5 INJECTION, SOLUTION INTRAMUSCULAR; INTRAVENOUS; SUBCUTANEOUS
Status: DISCONTINUED | OUTPATIENT
Start: 2023-03-10 | End: 2023-03-10 | Stop reason: HOSPADM

## 2023-03-10 RX ORDER — PROMETHAZINE HYDROCHLORIDE 25 MG/1
25 SUPPOSITORY RECTAL ONCE AS NEEDED
Status: DISCONTINUED | OUTPATIENT
Start: 2023-03-10 | End: 2023-03-10 | Stop reason: HOSPADM

## 2023-03-10 RX ORDER — SODIUM CHLORIDE, SODIUM LACTATE, POTASSIUM CHLORIDE, CALCIUM CHLORIDE 600; 310; 30; 20 MG/100ML; MG/100ML; MG/100ML; MG/100ML
9 INJECTION, SOLUTION INTRAVENOUS CONTINUOUS
Status: DISCONTINUED | OUTPATIENT
Start: 2023-03-10 | End: 2023-03-10 | Stop reason: HOSPADM

## 2023-03-10 RX ORDER — FLUMAZENIL 0.1 MG/ML
0.2 INJECTION INTRAVENOUS AS NEEDED
Status: DISCONTINUED | OUTPATIENT
Start: 2023-03-10 | End: 2023-03-10 | Stop reason: HOSPADM

## 2023-03-10 RX ORDER — ONDANSETRON 2 MG/ML
INJECTION INTRAMUSCULAR; INTRAVENOUS AS NEEDED
Status: DISCONTINUED | OUTPATIENT
Start: 2023-03-10 | End: 2023-03-10 | Stop reason: SURG

## 2023-03-10 RX ADMIN — PROPOFOL 200 MG: 10 INJECTION, EMULSION INTRAVENOUS at 12:30

## 2023-03-10 RX ADMIN — LIDOCAINE HYDROCHLORIDE 100 MG: 20 INJECTION, SOLUTION INTRAVENOUS at 12:30

## 2023-03-10 RX ADMIN — ONDANSETRON 4 MG: 2 INJECTION INTRAMUSCULAR; INTRAVENOUS at 14:28

## 2023-03-10 RX ADMIN — FAMOTIDINE 20 MG: 10 INJECTION INTRAVENOUS at 11:17

## 2023-03-10 RX ADMIN — EPHEDRINE SULFATE 20 MG: 50 INJECTION INTRAVENOUS at 12:42

## 2023-03-10 RX ADMIN — SODIUM CHLORIDE, POTASSIUM CHLORIDE, SODIUM LACTATE AND CALCIUM CHLORIDE 9 ML/HR: 600; 310; 30; 20 INJECTION, SOLUTION INTRAVENOUS at 10:02

## 2023-03-10 RX ADMIN — DEXAMETHASONE SODIUM PHOSPHATE 4 MG: 4 INJECTION, SOLUTION INTRAMUSCULAR; INTRAVENOUS at 12:40

## 2023-03-10 RX ADMIN — SODIUM CHLORIDE, POTASSIUM CHLORIDE, SODIUM LACTATE AND CALCIUM CHLORIDE 9 ML/HR: 600; 310; 30; 20 INJECTION, SOLUTION INTRAVENOUS at 12:10

## 2023-03-10 RX ADMIN — ACETAMINOPHEN 650 MG: 325 TABLET ORAL at 11:17

## 2023-03-10 RX ADMIN — SUGAMMADEX 400 MG: 100 INJECTION, SOLUTION INTRAVENOUS at 13:05

## 2023-03-10 RX ADMIN — SCOPALAMINE 1 PATCH: 1 PATCH, EXTENDED RELEASE TRANSDERMAL at 11:17

## 2023-03-10 RX ADMIN — ONDANSETRON 4 MG: 2 INJECTION INTRAMUSCULAR; INTRAVENOUS at 12:46

## 2023-03-10 RX ADMIN — HYDROMORPHONE HYDROCHLORIDE 0.5 MG: 1 INJECTION, SOLUTION INTRAMUSCULAR; INTRAVENOUS; SUBCUTANEOUS at 12:58

## 2023-03-10 RX ADMIN — DROPERIDOL 0.62 MG: 2.5 INJECTION, SOLUTION INTRAMUSCULAR; INTRAVENOUS at 14:45

## 2023-03-10 RX ADMIN — ROCURONIUM BROMIDE 50 MG: 10 INJECTION, SOLUTION INTRAVENOUS at 12:30

## 2023-03-10 RX ADMIN — FENTANYL CITRATE 100 MCG: 50 INJECTION, SOLUTION INTRAMUSCULAR; INTRAVENOUS at 12:30

## 2023-03-10 NOTE — INTERVAL H&P NOTE
H&P reviewed. The patient was examined and there are no changes to the H&P.         [facility]

## 2023-03-10 NOTE — OP NOTE
PREOPERATIVE DIAGNOSIS:  Symptomatic cholelithiasis    POSTOPERATIVE DIAGNOSIS (FINDINGS):  Same    PROCEDURE:  Laparoscopic cholecystectomy with cholangiogram    SURGEON:  Yandel Melgoza MD    ASSISTANT:  Austin Loomis PA-C was responsible for performing the following activities: suction, irrigation, suturing, closing, retraction, camera holding, and placing dressing, and their skilled assistance was necessary for the success of this case.    ANESTHESIA:  General    EBL:  Minimal    SPECIMEN(S):  Gallbladder    DESCRIPTION:  Supine position. General anesthesia. Prepped and draped, usual sterile manner.    0.5% Marcaine with epinephrine infiltrated in all incision sites. Small periumbilical incision made, Veress needle inserted with upward traction on abdominal wall. Abdomen insufflated to 15 mm Hg pressure and 5 mm Optiview trocar inserted followed by 10 mm subxiphoid and 5 mm right subcostal trocars.    Gallbladder grasped and elevated. Cystic duct dissected and clipped once distally. Cholangiogram catheter inserted and cholangiogram demonstrated prompt filling of duodenum, no filling defects. Cystic duct clipped twice proximally and divided. Cystic artery clipped twice proximally, once distally and divided. Gallbladder removed from the liver bed with electrocautery, placed in an Endo Catch bag, and removed through subxiphoid trocar site. Liver bed copiously irrigated and aspirated with good hemostasis noted. CO2 released, fascia of subxiphoid trocar site closed with 0-Vicryl, skin edges 5-0 Vicryl subcuticular suture.  Exofin applied.    Tolerated well.  Stable to PACU.    Yandel Melgoza M.D.

## 2023-03-10 NOTE — ANESTHESIA POSTPROCEDURE EVALUATION
Patient: Dayanna Lewis    Procedure Summary     Date: 03/10/23 Room / Location:  [facility] /  [facility]    Anesthesia Start: 1226 Anesthesia Stop: 1329    Procedure: CHOLECYSTECTOMY LAPAROSCOPIC INTRAOPERATIVE CHOLANGIOGRAM (Abdomen) Diagnosis:       Calculus of gallbladder without cholecystitis without obstruction      (Calculus of gallbladder without cholecystitis without obstruction [K80.20])    Surgeons: Yandel Melgoza MD Provider: Cristobal Pacheco MD    Anesthesia Type: general ASA Status: 3          Anesthesia Type: general    Vitals  Vitals Value Taken Time   /59 03/10/23 1530   Temp 36.1 °C (97 °F) 03/10/23 1500   Pulse 67 03/10/23 1532   Resp 16 03/10/23 1530   SpO2 100 % 03/10/23 1532   Vitals shown include unvalidated device data.        Post Anesthesia Care and Evaluation    Patient location during evaluation: bedside  Patient participation: complete - patient participated  Level of consciousness: awake  Pain management: adequate    Airway patency: patent  Anesthetic complications: No anesthetic complications  PONV Status: controlled  Cardiovascular status: acceptable  Respiratory status: acceptable  Hydration status: acceptable    Comments: /72 (BP Location: Left arm, Patient Position: Lying)   Pulse 65   Temp 36.1 °C (97 °F) (Oral)   Resp 16   LMP  (LMP Unknown)   SpO2 100%

## 2023-03-10 NOTE — ANESTHESIA PREPROCEDURE EVALUATION
Anesthesia Evaluation     Patient summary reviewed and Nursing notes reviewed   no history of anesthetic complications:  NPO Solid Status: > 8 hours  NPO Liquid Status: > 8 hours           Airway   Mallampati: II  TM distance: >3 FB  Neck ROM: full  Small opening and Possible difficult intubation  Dental - normal exam     Pulmonary - negative pulmonary ROS and normal exam   (-) asthma, sleep apnea, not a smoker  Cardiovascular - normal exam  Exercise tolerance: good (4-7 METS)    (+) hypertension,   (-) CAD, dysrhythmias, angina, RATLIFF      Neuro/Psych  (+) psychiatric history,    (-) seizures, CVA  GI/Hepatic/Renal/Endo    (+)  GERD well controlled,  renal disease CRI, diabetes mellitus (has insulin pump will leave on during procedure ), thyroid problem   (-) liver disease    Musculoskeletal (-) negative ROS    Abdominal  - normal exam   Substance History - negative use     OB/GYN negative ob/gyn ROS         Other                      Anesthesia Plan    ASA 3     general     intravenous induction     Anesthetic plan, risks, benefits, and alternatives have been provided, discussed and informed consent has been obtained with: patient.    Plan discussed with CRNA.        CODE STATUS:

## 2023-03-10 NOTE — DISCHARGE INSTRUCTIONS
Dr. Yandel Melgoza  [address]  [place] [id number] [phone]    Discharge Instructions for Gall Bladder Surgery    Go home, rest and take it easy today; however, you should get up and move about several times today to reduce the risk of developing a clot in your legs.      You may experience some dizziness or memory loss from the anesthesia.  This may last for the next 24 hours.  Someone should plan on staying with you for the first 24 hours for your safety.    Do not make any important legal decisions or sign any legal papers for the next 24 hours.      Eat and drink lightly today.  Start off with liquids, jello, soup, crackers or other bland foods at first. You may advance your diet tomorrow as tolerated as long as you do not experience any nausea or vomiting.     If skin glue (Dermabond) was used, your incisions are protected and covered.  The invisible glue will dissolve on its own as your incision heals. If you have dressings, you may remove your outer dressings in 3 days.  The white tapes called steri-strips should stay in place.  They will fall off on their own in 1-2 weeks.  Do not worry if they come off sooner.      If you have dressings, you may notice some bleeding/drainage on your outer dressings. A little bloody drainage is normal. If the bleeding/drainage is such that the bandage cannot absorb it, remove the dressing, apply clean gauze and apply firm pressure for a full 15 minutes.  If the bleeding continues, please call me.    You may shower tomorrow allowing water to run over the incisions; however, do not scrub the incisions.   No tub baths until your incisions are completely healed.         You have received a prescription for a narcotic pain medicine, as you will have some pain following surgery.   You will not be totally pain free, but your pain medicine should make the pain tolerable.  Please take your pain medicine as prescribed and always take your pills with food to  prevent nausea. If you are having severe pain that cannot be controlled by the pain medicine, please contact me.      You have also received a prescription for an anti-nausea medicine.  Please take this as prescribed for any nausea or vomiting.  Nausea could be a result of the anesthesia or a result of the narcotic pain medicine.  If you experience severe nausea and vomiting that cannot be controlled by the nausea medicine, please call me.      It is not unusual to experience pain/discomfort in your shoulders or under your ribs after surgery.  It is from the gas used during the laparoscopic procedure and usually lasts 1-3 days.  The prescription pain medicine is used to treat the surgical pain and does not typically alleviate this “gassy” pain.     No driving for 24 hours and for as long as you are taking your prescription pain medicine.      You will need to call the office at [phone] to schedule a follow-up appointment in 6-10 days.     Remember to contact me for any of the following:    Fever > 101 degrees  Severe pain that cannot be controlled by taking your pain pills  Severe nausea or vomiting that cannot be controlled by taking your nausea pills  Significant bleeding of your incisions  Drainage that has a bad smell or is yellow or green in appearance  Any other questions or concerns

## 2023-03-10 NOTE — ANESTHESIA PROCEDURE NOTES
Airway  Urgency: elective    Date/Time: 3/10/2023 12:35 PM  Airway not difficult    General Information and Staff    Patient location during procedure: OR  Anesthesiologist: Cristobal Pacheco MD  CRNA/CAA: Viridiana Miller CRNA    Indications and Patient Condition  Indications for airway management: airway protection    Preoxygenated: yes  Mask difficulty assessment: 1 - vent by mask    Final Airway Details  Final airway type: endotracheal airway      Successful airway: ETT  Cuffed: yes   Successful intubation technique: direct laryngoscopy  Facilitating devices/methods: intubating stylet  Endotracheal tube insertion site: oral  Blade: Chavarria  Blade size: 2  ETT size (mm): 7.0  Cormack-Lehane Classification: grade I - full view of glottis  Placement verified by: chest auscultation and capnometry   Measured from: lips  ETT/EBT  to lips (cm): 20  Number of attempts at approach: 1  Assessment: lips, teeth, and gum same as pre-op and atraumatic intubation    Additional Comments  Atraumatic, MOP to cuff, BSBE, no change to dentition, secured with tape

## 2023-03-13 LAB
LAB AP CASE REPORT: NORMAL
PATH REPORT.FINAL DX SPEC: NORMAL
PATH REPORT.GROSS SPEC: NORMAL

## 2023-03-23 ENCOUNTER — OFFICE VISIT (OUTPATIENT)
Dept: SURGERY | Facility: CLINIC | Age: 68
End: 2023-03-23
Payer: MEDICARE

## 2023-03-23 VITALS — WEIGHT: 173 LBS | HEIGHT: 60 IN | BODY MASS INDEX: 33.96 KG/M2

## 2023-03-23 DIAGNOSIS — Z48.89 POSTOPERATIVE VISIT: Primary | ICD-10-CM

## 2023-03-23 PROCEDURE — 1159F MED LIST DOCD IN RCRD: CPT | Performed by: SURGERY

## 2023-03-23 PROCEDURE — 99024 POSTOP FOLLOW-UP VISIT: CPT | Performed by: SURGERY

## 2023-03-23 PROCEDURE — 1160F RVW MEDS BY RX/DR IN RCRD: CPT | Performed by: SURGERY

## 2023-03-24 NOTE — PROGRESS NOTES
Postoperative visit    Laparoscopic cholecystectomy with cholangiogram    Pathology: Mild chronic cholecystitis with cholelithiasis  Cholangiogram: Normal    Office visit: Incisions are healing well and she is doing well, reporting no problems from the surgery.  Follow-up as needed.

## 2023-03-27 RX ORDER — INSULIN GLULISINE 100 [IU]/ML
INJECTION, SOLUTION SUBCUTANEOUS
Qty: 130 ML | Refills: 1 | Status: SHIPPED | OUTPATIENT
Start: 2023-03-27

## 2023-04-19 ENCOUNTER — OFFICE VISIT (OUTPATIENT)
Dept: ENDOCRINOLOGY | Age: 68
End: 2023-04-19
Payer: MEDICARE

## 2023-04-19 VITALS
TEMPERATURE: 97.1 F | DIASTOLIC BLOOD PRESSURE: 70 MMHG | HEIGHT: 60 IN | OXYGEN SATURATION: 98 % | WEIGHT: 171 LBS | BODY MASS INDEX: 33.57 KG/M2 | SYSTOLIC BLOOD PRESSURE: 118 MMHG | HEART RATE: 64 BPM

## 2023-04-19 DIAGNOSIS — E78.5 HYPERLIPIDEMIA, UNSPECIFIED HYPERLIPIDEMIA TYPE: ICD-10-CM

## 2023-04-19 DIAGNOSIS — N18.32 HYPERTENSIVE KIDNEY DISEASE WITH STAGE 3B CHRONIC KIDNEY DISEASE: ICD-10-CM

## 2023-04-19 DIAGNOSIS — I12.9 HYPERTENSIVE KIDNEY DISEASE WITH STAGE 3B CHRONIC KIDNEY DISEASE: ICD-10-CM

## 2023-04-19 DIAGNOSIS — E55.9 VITAMIN D DEFICIENCY: ICD-10-CM

## 2023-04-19 DIAGNOSIS — N18.32 TYPE 1 DIABETES MELLITUS WITH STAGE 3B CHRONIC KIDNEY DISEASE: Primary | ICD-10-CM

## 2023-04-19 DIAGNOSIS — E03.9 PRIMARY HYPOTHYROIDISM: ICD-10-CM

## 2023-04-19 DIAGNOSIS — E10.22 TYPE 1 DIABETES MELLITUS WITH STAGE 3B CHRONIC KIDNEY DISEASE: Primary | ICD-10-CM

## 2023-04-19 NOTE — PROGRESS NOTES
Chief Complaint  Chief Complaint   Patient presents with   • Diabetes     Type 1: Pt pump is attached, is up to date on eye exam, no hx of retinopathy or neuropathy.        Subjective          History of Present Illness    Dayanna Lewis 67 y.o. presents for a follow-up evaluation for type 1 DM     She has been diabetic for more than 40 years and started insulin about 35 years ago     She started on insulin pump in 2011     Pt is on the following medications for their DM: Apidria via Absolute Antibodytronic 670 pump with sensor        Pump Settings     Basal : 1.55        Carb ratio:  0000- 1700 is 1 to 10                      1300- 000 1 to 11                           Sensitivty : 42                              Target : 110-130        Active Insulin Time: 3 hours        Denies diarrhea, constipation, chest pain, shortness of breath, vision changes or numbness and tingling in feet/hands.    Weight loss of 6 lbs since last visit.    Pt does not have a history of DM retinopathy.  Last eye exam was Dec 2022     Pt does have a history of nephropathy.  Patient is currently taking ARB.  Follows with Dr. Medhat Walker     Pt does not have neuropathy.       Pt does not have a history of CAD or CVA.    Last A1C in 04/23 was 7.90    Last microalbumin in 04/23 was negative          Blood Sugars    Blood glucoses are checked 4/day.    Fasting blood glucoses: 70s - mid 100s    Pre-meal blood glucoses: 60s- 200s    Pt has rare episodes of hypoglycemia.          Sensor Data    Time in range 76%  High 20%  Very high 3%  Low 1%  Very low 0%      Average Glucose - 161 mg/dL      Sensor Wear - 95 %        Time in Auto Mode - 98 %  Time in Manual Mode - 2 %             Hypothyroid    Pt complains of dry skin (no change)    Denies constipation, diarrhea, hair loss, chest pain, palpitations, heat intolerance or cold intolerance.    Current treatment is branded Synthroid 50 mcg daily    Last labs in 04/23 showed TSH 1.610            Hyperlipidemia  "    Pt denies any muscle/body aches, chest pain or shortness of breath    Pt is currently taking Crestor 10 mg HS    Last lipid panel in 04/23 showed Total 135, HDL 61, LDL 59 and Triglycerides 73              Hypertension with CKD Stage 3b     Pt denies any chest pain, palpitations, shortness of breath, or headache     Current regimen includes Bystolic 10 mg daily and losartan 25 mg daily                 Vitamin D Deficiency     Current treatment includes 50,000 units once a week     Last Vit D level was 51.4 in 04/23           I have reviewed the patient's allergies, medicines, past medical hx, family hx and social hx.    Objective   Vital Signs:   /70   Pulse 64   Temp 97.1 °F (36.2 °C) (Temporal)   Ht 152.4 cm (60\")   Wt 77.6 kg (171 lb)   LMP  (LMP Unknown)   SpO2 98%   BMI 33.40 kg/m²       Physical Exam   Physical Exam  Constitutional:       General: She is not in acute distress.     Appearance: Normal appearance. She is not diaphoretic.   HENT:      Head: Normocephalic and atraumatic.   Eyes:      General:         Right eye: No discharge.         Left eye: No discharge.   Skin:     General: Skin is warm and dry.   Neurological:      Mental Status: She is alert.   Psychiatric:         Mood and Affect: Mood normal.         Behavior: Behavior normal.                    Results Review:   Hemoglobin A1C   Date Value Ref Range Status   04/12/2023 7.90 (H) 4.80 - 5.60 % Final     Comment:     Hemoglobin A1C Ranges:  Increased Risk for Diabetes  5.7% to 6.4%  Diabetes                     >= 6.5%  Diabetic Goal                < 7.0%       Triglycerides   Date Value Ref Range Status   04/12/2023 73 0 - 150 mg/dL Final     HDL Cholesterol   Date Value Ref Range Status   04/12/2023 61 (H) 40 - 60 mg/dL Final     LDL Chol Calc (NIH)   Date Value Ref Range Status   04/12/2023 59 0 - 100 mg/dL Final     VLDL Cholesterol [place]   Date Value Ref Range Status   04/12/2023 15 5 - 40 mg/dL Final         Assessment and " Plan {CC Problem List  Visit Diagnosis  ROS  Review (Popup)  Health Maintenance  Quality  BestPractice  Medications  SmartSets  SnapShot Encounters  Media :23  Diagnoses and all orders for this visit:    1. Type 1 diabetes mellitus with stage 3b chronic kidney disease (Primary)  -     Hemoglobin A1c; Future  -     Comprehensive Metabolic Panel; Future      A1c is 7.9%  Continue with Apidria via Medtronic 670 pump with sensor with current settings  Continue with BG checks      2. Primary hypothyroidism  -     TSH; Future  -     T4, Free; Future    Thyroid labs are good.    Continue with branded Synthroid 50 mcg daily        3. Hyperlipidemia, unspecified hyperlipidemia type  -     Comprehensive Metabolic Panel; Future  -     Lipid Panel; Future      Lipid panel is good.    Continue with statin.       4. Hypertensive kidney disease with stage 3b chronic kidney disease  -     Comprehensive Metabolic Panel; Future    Stable  Continue with current medication regimen  Defer management to PCP/Nephrology        5. Vitamin D deficiency    Vitamin D levels are good.    Continue with supplement         No refills needed at this time        RTC on 07/26/23 with Dr. Stokes and labs on 07/12/23      Follow Up     Patient was given instructions and counseling regarding her condition or for health maintenance advice. Please see specific information pulled into the AVS if appropriate.              Silke Trejo, APRN  04/19/23

## 2023-07-26 ENCOUNTER — OFFICE VISIT (OUTPATIENT)
Dept: ENDOCRINOLOGY | Age: 68
End: 2023-07-26
Payer: MEDICARE

## 2023-07-26 VITALS
SYSTOLIC BLOOD PRESSURE: 116 MMHG | DIASTOLIC BLOOD PRESSURE: 68 MMHG | BODY MASS INDEX: 33.61 KG/M2 | WEIGHT: 171.2 LBS | HEART RATE: 66 BPM | HEIGHT: 60 IN | TEMPERATURE: 96.4 F | OXYGEN SATURATION: 98 %

## 2023-07-26 DIAGNOSIS — E78.5 HYPERLIPIDEMIA, UNSPECIFIED HYPERLIPIDEMIA TYPE: ICD-10-CM

## 2023-07-26 DIAGNOSIS — E03.9 PRIMARY HYPOTHYROIDISM: ICD-10-CM

## 2023-07-26 DIAGNOSIS — M85.89 OSTEOPENIA OF MULTIPLE SITES: ICD-10-CM

## 2023-07-26 DIAGNOSIS — E10.21 TYPE 1 DIABETES MELLITUS WITH NEPHROPATHY: Primary | ICD-10-CM

## 2023-07-26 DIAGNOSIS — Z96.41 INSULIN PUMP STATUS: ICD-10-CM

## 2023-07-26 DIAGNOSIS — E55.9 VITAMIN D DEFICIENCY: ICD-10-CM

## 2023-07-26 PROBLEM — M81.0 OSTEOPOROSIS: Status: RESOLVED | Noted: 2017-09-05 | Resolved: 2023-07-26

## 2023-07-26 PROBLEM — K80.20 CALCULUS OF GALLBLADDER WITHOUT CHOLECYSTITIS WITHOUT OBSTRUCTION: Status: RESOLVED | Noted: 2023-02-22 | Resolved: 2023-07-26

## 2023-07-26 PROBLEM — E16.2 HYPOGLYCEMIA: Status: RESOLVED | Noted: 2017-09-05 | Resolved: 2023-07-26

## 2023-07-26 PROBLEM — L08.9 SKIN INFECTION: Status: RESOLVED | Noted: 2022-07-28 | Resolved: 2023-07-26

## 2023-07-26 PROCEDURE — 99214 OFFICE O/P EST MOD 30 MIN: CPT | Performed by: INTERNAL MEDICINE

## 2023-07-26 PROCEDURE — 3052F HG A1C>EQUAL 8.0%<EQUAL 9.0%: CPT | Performed by: INTERNAL MEDICINE

## 2023-07-26 RX ORDER — FAMOTIDINE 20 MG
TABLET ORAL
COMMUNITY

## 2023-07-26 NOTE — PROGRESS NOTES
Subjective   Dayanna Lewis is a 67 y.o. female.     History of Present Illness     Patient is a 67-year-old female who came in for follow-up.     She has known diabetes mellitus for more than 40 years and started on insulin about 35 years ago. She started on insulin pump in 2011. SUSAN antibodies is negative and C-peptide has been undetectable. She is using a Medtronic 670 pump with Medtronic sensor. She is using Apidra insulin on the pump.  Hemoglobin A1c done in July 2023 is 8.0%.     Basal rates:  1.55     Carbohydrate ratio: 12 AM-10.0.  5 PM-11.0.     Insulin sensitivity: 42  Target: 110-130.  Active insulin time 3 hours.     Sensor data reviewed  Average glucose 155 mg per DL.    Time in target 66%.  Time above target 29%.  Time below target 5%.  Total daily dose 49.3.  Total bolus 12.2.  100% manual mode.  She had 1 episode of early morning hypoglycemia around 3 AM.  She has intermittent postprandial hyperglycemia more often at breakfast.    Her last eye examination was in December 2022 done by Dr. Lentz. She has no retinopathy. Urine microalbumin was normal in 4/23. She denies numbness, tingling or burning in her hands or feet.    She has CKD and is seeing Dr. Walker.  She is on vit D3 1000 units/day.     She has osteoporosis and was on an unknown medication for 5 years. She was taken off Evista prescribed by Dr. Martines in 2021. Her last bone density was many years ago.      Bone density done on August 4, 2022 at [facility] imaging showed osteopenia of multiple sites.  10-year risk of major osteoporotic fracture is 16% and of hip fracture is 2.2%.  She will be following with Dr. Arredondo.     She has hypothyroidism and is on Synthroid 50 mcg/day. She has no history of goiter or head/neck radiation therapy. She has gained 3 pounds since October 2021.. TSH done in July 2023 is normal at 2.48.     She has hyperlipidemia and is on Crestor 10 mg/day. She denies myalgia. Lipid panel done in July 2023 are as follows:  "Cholesterol 142.  HDL 53.  LDL 63.  Triglycerides 150.     She has hypertension and is on Bystolic 10 mg/day and losartan 25 mg/day. She has no history of heart attack or stroke. She denies chest pain or shortness of breath.    The following portions of the patient's history were reviewed and updated as appropriate: allergies, current medications, past family history, past medical history, past social history, past surgical history, and problem list.    Review of Systems   Eyes:  Negative for visual disturbance.   Respiratory:  Negative for shortness of breath.    Cardiovascular:  Negative for chest pain and palpitations.   Gastrointestinal: Negative.    Genitourinary: Negative.    Musculoskeletal:  Negative for myalgias.   Neurological:  Negative for numbness.   Vitals:    07/26/23 1004   BP: 116/68   Pulse: 66   Temp: 96.4 °F (35.8 °C)   TempSrc: Temporal   SpO2: 98%   Weight: 77.7 kg (171 lb 3.2 oz)   Height: 152.4 cm (60\")      Objective   Physical Exam  Constitutional:       General: She is not in acute distress.     Appearance: Normal appearance. She is not ill-appearing or toxic-appearing.   HENT:      Ears:      Comments: Decreased hearing bilaterally  Eyes:      General: No scleral icterus.        Right eye: No discharge.         Left eye: No discharge.   Neck:      Vascular: No carotid bruit.   Cardiovascular:      Rate and Rhythm: Normal rate and regular rhythm.      Heart sounds: Normal heart sounds. No murmur heard.    No gallop.   Pulmonary:      Breath sounds: No rales.   Chest:      Chest wall: No tenderness.   Abdominal:      General: Bowel sounds are normal.      Palpations: Abdomen is soft.      Tenderness: There is no right CVA tenderness or left CVA tenderness.   Musculoskeletal:      Right lower leg: No edema.      Left lower leg: No edema.   Lymphadenopathy:      Cervical: No cervical adenopathy.   Skin:     General: Skin is warm.   Neurological:      Mental Status: She is alert and oriented " to person, place, and time.   Psychiatric:         Mood and Affect: Mood normal.     Results Encounter on 07/12/2023   Component Date Value Ref Range Status    Hemoglobin A1C 07/12/2023 8.00 (H)  4.80 - 5.60 % Final    Comment: Hemoglobin A1C Ranges:  Increased Risk for Diabetes  5.7% to 6.4%  Diabetes                     >= 6.5%  Diabetic Goal                < 7.0%      Glucose 07/12/2023 157 (H)  65 - 99 mg/dL Final    BUN 07/12/2023 17  8 - 23 mg/dL Final    Creatinine 07/12/2023 1.51 (H)  0.57 - 1.00 mg/dL Final    EGFR Result 07/12/2023 38.0 (L)  >60.0 mL/min/1.73 Final    Comment: GFR Normal >60  Chronic Kidney Disease <60  Kidney Failure <15      BUN/Creatinine Ratio 07/12/2023 11.3  7.0 - 25.0 Final    Sodium 07/12/2023 140  136 - 145 mmol/L Final    Potassium 07/12/2023 4.5  3.5 - 5.2 mmol/L Final    Chloride 07/12/2023 106  98 - 107 mmol/L Final    Total CO2 07/12/2023 26.2  22.0 - 29.0 mmol/L Final    Calcium 07/12/2023 9.4  8.6 - 10.5 mg/dL Final    Total Protein 07/12/2023 6.0  6.0 - 8.5 g/dL Final    Albumin 07/12/2023 4.1  3.5 - 5.2 g/dL Final    Globulin 07/12/2023 1.9  gm/dL Final    A/G Ratio 07/12/2023 2.2  g/dL Final    Total Bilirubin 07/12/2023 0.4  0.0 - 1.2 mg/dL Final    Alkaline Phosphatase 07/12/2023 102  39 - 117 U/L Final    AST (SGOT) 07/12/2023 15  1 - 32 U/L Final    ALT (SGPT) 07/12/2023 13  1 - 33 U/L Final    Total Cholesterol 07/12/2023 142  0 - 200 mg/dL Final    Comment: Cholesterol Reference Ranges  (U.S. Department of Health and Human Services ATP III  Classifications)  Desirable          <200 mg/dL  Borderline High    200-239 mg/dL  High Risk          >240 mg/dL  Triglyceride Reference Ranges  (U.S. Department of Health and Human Services ATP III  Classifications)  Normal           <150 mg/dL  Borderline High  150-199 mg/dL  High             200-499 mg/dL  Very High        >500 mg/dL  HDL Reference Ranges  (U.S. Department of Health and Human Services ATP  III  Classifications)  Low     <40 mg/dl (major risk factor for CHD)  High    >60 mg/dl ('negative' risk factor for CHD)  LDL Reference Ranges  (U.S. Department of Health and Human Services ATP III  Classifications)  Optimal          <100 mg/dL  Near Optimal     100-129 mg/dL  Borderline High  130-159 mg/dL  High             160-189 mg/dL  Very High        >189 mg/dL      Triglycerides 07/12/2023 150  0 - 150 mg/dL Final    HDL Cholesterol 07/12/2023 53  40 - 60 mg/dL Final    VLDL Cholesterol [place] 07/12/2023 26  5 - 40 mg/dL Final    LDL Chol Calc ([facility]) 07/12/2023 63  0 - 100 mg/dL Final    TSH 07/12/2023 2.480  0.270 - 4.200 uIU/mL Final    Free T4 07/12/2023 1.29  0.93 - 1.70 ng/dL Final    Results may be falsely increased if patient taking Biotin.    Interpretation 07/12/2023 Note   Final    Comment: -------------------------------  CHRONIC KIDNEY DISEASE:  EGFR, BLOOD PRESSURE, AND PROTEINURIA ASSESSMENT  The overall regression of eGFR with time is not  statistically significant. Current eGFR is 38 mL/min/1.73mE2  corresponding to CKD stage 3b. Potassium is within goal and  has not changed significantly, was 4.3 and now is 4.5  mmol/L. Glycemic control (HB A1c: 8.0 %) is not within goal  and additional action is indicated. Previous urine protein  measurement was normal.  EGFR, BLOOD PRESSURE, AND PROTEINURIA TREATMENT SUGGESTIONS  -  Based upon current eGFR, patient is at high risk for adverse  outcomes such as CKD progression, CVD, and mortality.  Guidelines recommend treating patients with type 2 diabetes  and CKD with an SGLT2 inhibitor for cardiorenal protection.  Guidelines recommend a target blood pressure of 120/80 mmHg  or less to reduce cardiovascular risk and CKD progression.  EGFR, BLOOD PRESSURE, AND PROTEINURIA FOLLOW-UP  -  fasting Renal Panel within 6 months; Spot U                           rine Panel  (Albumin preferred) within 9 months; Hemoglobin A1C within 3  months;  BONE and MINERAL  ASSESSMENT  Calcium is within goal and has not changed significantly,  was 9.4 and now is 9.4 mg/dL. Carbon Dioxide is within goal  and has not changed significantly, was 24 and now is 26  mmol/L. Vitamin D is adequate (51.4 ng/mL 4/12/2023) .  BONE and MINERAL TREATMENT SUGGESTIONS  -  Interpretations require simultaneous measurements of serum  calcium and phosphorus.  BONE and MINERAL FOLLOW-UP  -  25-Hydroxy Vitamin D within 9 months; fasting PTH with Renal  Panel is due;  LIPIDS ASSESSMENT  Blood was non-fasting; interpret these results with caution.  LDL-C is optimal and has not changed significantly, was 59  and now is 63 mg/dL. Triglyceride is borderline high and has  risen, was 73 and now is 150 mg/dL. Non-HDL Cholesterol is  optimal and has risen, was 74 and now is 89 mg/dL. HDL-C is  normal and has decreased, was 61 and now is 53 mg/dL.  LIPIDS TREATMENT SUGGESTIONS  -  Therape                           utic lifestyle changes are always valuable to  maintain optimal blood lipid status (diet, exercise, weight  management). Continue statin if in use. Consider measurement  of LDL particle number or Apo B to adjudicate need for  further LDL lowering therapy. If statin cannot be tolerated  or increased, alternatives include use of an intestinal  agent (ezetimibe or bile acid sequestrant), niacin, and/or  fish oil.  LIPIDS FOLLOW-UP  -  fasting Lipid Panel within 12 months;  ANEMIA ASSESSMENT  Most recent order does not include a CBC Panel or iron  studies.  ANEMIA FOLLOW-UP  -  CBC within 3 months;  -------------------------------  DISCLAIMER  These assessments and treatment suggestions are provided as  a convenience in support of the physician-patient  relationship and are not intended to replace the physician's  clinical judgment. They are derived from national guidelines  in addition to other evidence and expert opinion. The  clinician should consider this information within the  contex                            t of clinical opinion and the individual patient.  SEE GUIDANCE FOR CHRONIC KIDNEY DISEASE PROGRAM: Kidney  Disease Improving Global Outcomes (KDIGO) clinical practice  guidelines are at http://kdigo.org/home/guidelines/.  National Kidney Foundation Kidney Disease Outcomes Quality  Initiative (KDOQI (TM)), with its limitations and  disclaimers, are at www.kidney.org/professionals/KDOQI. This  program is intended for patients who have been diagnosed  with stages 3, 4, or pre-dialysis 5 CKD. It is not intended  for children, pregnant patients, or transplant patients.      Interpretation 07/12/2023 Note   Final    Supplemental report is available.     Assessment & Plan   Diagnoses and all orders for this visit:    1. Type 1 diabetes mellitus with nephropathy (Primary)  -     Comprehensive Metabolic Panel; Future  -     Hemoglobin A1c; Future    2. Osteopenia of multiple sites  -     Comprehensive Metabolic Panel; Future  -     Vitamin D,25-Hydroxy; Future    3. Vitamin D deficiency  -     Comprehensive Metabolic Panel; Future    4. Primary hypothyroidism  -     Comprehensive Metabolic Panel; Future  -     TSH; Future    5. Insulin pump status    6. Hyperlipidemia, unspecified hyperlipidemia type  -     Comprehensive Metabolic Panel; Future  -     Lipid Panel; Future      Switch to auto mode.  Continue on current insulin pump settings.  Continue vitamin D3 1000 units/day.  Follow-up with Dr. Arredondo for treatment of osteopenia.  Continue Synthroid 50 mcg/day.  Continue Crestor 10 mg/day.  Continue Bystolic and losartan.  Will defer treatment to Dr. Ellis.  Flu vac this fall    Follow-up with me in October 2023 and with GHADA Trejo NP in 6 mos.

## 2023-09-08 ENCOUNTER — OFFICE VISIT (OUTPATIENT)
Dept: OBSTETRICS AND GYNECOLOGY | Facility: CLINIC | Age: 68
End: 2023-09-08
Payer: MEDICARE

## 2023-09-08 ENCOUNTER — APPOINTMENT (OUTPATIENT)
Dept: WOMENS IMAGING | Facility: HOSPITAL | Age: 68
End: 2023-09-08
Payer: MEDICARE

## 2023-09-08 VITALS
HEART RATE: 74 BPM | HEIGHT: 60 IN | DIASTOLIC BLOOD PRESSURE: 73 MMHG | SYSTOLIC BLOOD PRESSURE: 131 MMHG | BODY MASS INDEX: 33.38 KG/M2 | WEIGHT: 170 LBS

## 2023-09-08 DIAGNOSIS — Z01.419 ENCOUNTER FOR GYNECOLOGICAL EXAMINATION WITHOUT ABNORMAL FINDING: Primary | ICD-10-CM

## 2023-09-08 DIAGNOSIS — N39.41 URGE INCONTINENCE: ICD-10-CM

## 2023-09-08 PROCEDURE — 77063 BREAST TOMOSYNTHESIS BI: CPT | Performed by: RADIOLOGY

## 2023-09-08 PROCEDURE — 77067 SCR MAMMO BI INCL CAD: CPT | Performed by: RADIOLOGY

## 2023-09-08 RX ORDER — TOLTERODINE 2 MG/1
2 CAPSULE, EXTENDED RELEASE ORAL DAILY
Qty: 90 CAPSULE | Refills: 3 | Status: SHIPPED | OUTPATIENT
Start: 2023-09-08

## 2023-09-08 RX ORDER — BACILLUS COAGULANS/INULIN 500MM-1.5G
TABLET,CHEWABLE ORAL
COMMUNITY
Start: 2023-05-18

## 2023-09-08 RX ORDER — ESTRADIOL 0.1 MG/G
0.5 CREAM VAGINAL
Qty: 42.5 G | Refills: 12 | Status: SHIPPED | OUTPATIENT
Start: 2023-09-08

## 2023-09-08 NOTE — PROGRESS NOTES
GYN Annual Exam     CC- Here for annual exam.     Dayanna Lewis is a 67 y.o. female who presents for annual well woman exam. Periods are  absent due to Menopause.     OB History          2    Para   2    Term                AB        Living   2         SAB        IAB        Ectopic        Molar        Multiple        Live Births                    Current contraception: post menopausal status  History of abnormal Pap smear: no  Family history of uterine, colon or ovarian cancer: no  History of abnormal mammogram: no  Family history of breast cancer: no  Last Pap : 2021 NIL   Last mammogram: today  Last colonoscopy: Cologaurd early this year   Last DEXA: 2022 Osteopenia  Parental Hip Fracture: Dad     Past Medical History:   Diagnosis Date    Chronic kidney disease     Stage 3    Depression     Dermatomyositis     TYPE 2    Diabetes 2016    Diabetes mellitus type 1, uncontrolled     Disease of thyroid gland     Dyslipidemia     Gallstone     GERD (gastroesophageal reflux disease)     [place] (hard of hearing)     Hyperlipidemia     Hypertension     Hypothyroidism     Menopausal disorder     Osteoporosis     Polymyositis     Vitamin D deficiency        Past Surgical History:   Procedure Laterality Date     SECTION      CHOLECYSTECTOMY WITH INTRAOPERATIVE CHOLANGIOGRAM N/A 03/10/2023    Procedure: CHOLECYSTECTOMY LAPAROSCOPIC INTRAOPERATIVE CHOLANGIOGRAM;  Surgeon: Yandel Melgoza MD;  Location: [facility] OR [facility];  Service: General;  Laterality: N/A;    COLONOSCOPY      FRACTURE SURGERY  1966    Broke left arm    INNER EAR SURGERY      KNEE SURGERY Right     for ACL repair    OTHER SURGICAL HISTORY      EAR SURGERY         Current Outpatient Medications:     Bacillus Coagulans-Inulin (Benefiber Prebiotic+Probiotic) chewable tablet, , Disp: , Rfl:     Apidra 100 UNIT/ML injection, USE UP  UNITS DAILY WITH PUMP AS DIRECTED - DISCARD VIAL 28 DAYS AFTER OPENING, Disp: 130 mL,  Rfl: 1    aspirin 81 MG tablet, Take 1 tablet by mouth Every Night. PT STATED MD INSTRUCTED TO CONTINUE, Disp: , Rfl:     buPROPion XL (WELLBUTRIN XL) 300 MG 24 hr tablet, TAKE 1 TABLET BY MOUTH EVERY MORNING, Disp: 90 tablet, Rfl: 0    BYSTOLIC 10 MG tablet, Take 1 tablet by mouth Daily., Disp: 90 tablet, Rfl: 3    Calcium Carbonate Antacid (TUMS E-X PO), Take 1,000 mg by mouth Daily., Disp: , Rfl:     Calcium-Phosphorus-Vitamin D (Citracal +D3) 250-107-500 MG-MG-UNIT chewable tablet, Chew 1 tablet Daily., Disp: , Rfl:     estradiol (ESTRACE VAGINAL) 0.1 MG/GM vaginal cream, Insert 0.5 g into the vagina 3 (Three) Times a Week if Needed (atrophy)., Disp: 42.5 g, Rfl: 12    famotidine (PEPCID) 40 MG tablet, Take 1 tablet by mouth Daily., Disp: , Rfl:     folic acid (FOLVITE) 1 MG tablet, TAKE 1 TABLET BY MOUTH DAILY (Patient taking differently: Take 1 tablet by mouth Daily.), Disp: 90 tablet, Rfl: 0    glucagon (GLUCAGEN) 1 MG injection, Use as directed (Patient taking differently: Take As Directed. Use as directed), Disp: 2 kit, Rfl: 5    glucose blood test strip, Mari Contour Next Test In Vitro Strip; Patient Sig: Mari Contour Next Test In Vitro Strip test 8 times daily; 240; 5; 10-Nov-2014; Active, Disp: 800 each, Rfl: 0    Lancets misc, Dispense based on insurance preference: Check 4-6 times a day. Dx: E 10.22, Disp: 500 each, Rfl: 4    Lantus 100 UNIT/ML injection, 40  units subcutaneous daily (Patient taking differently: Take As Directed.), Disp: 45 mL, Rfl: 1    losartan (COZAAR) 25 MG tablet, Take 1 tablet by mouth every night at bedtime., Disp: , Rfl:     Probiotic Product (PROBIOTIC-10 PO), Take 1 tablet/day by mouth., Disp: , Rfl:     rosuvastatin (CRESTOR) 10 MG tablet, TAKE 1 TABLET BY MOUTH EVERY NIGHT AT BEDTIME, Disp: 90 tablet, Rfl: 3    Synthroid 50 MCG tablet, Take 1 tablet by mouth Daily., Disp: 90 tablet, Rfl: 1    tolterodine LA (Detrol LA) 2 MG 24 hr capsule, Take 1 capsule by mouth Daily.,  "Disp: 90 capsule, Rfl: 3    Vitamin D, Cholecalciferol, 25 MCG (1000 UT) capsule, Take  by mouth., Disp: , Rfl:     vitamin E 400 UNIT capsule, Take 1 capsule by mouth Daily. PT TO HOLD FOR SURGERY, Disp: , Rfl:     No Known Allergies    Social History     Tobacco Use    Smoking status: Never    Smokeless tobacco: Never   Vaping Use    Vaping Use: Never used   Substance Use Topics    Alcohol use: No    Drug use: No       Family History   Problem Relation Age of Onset    Depression Mother     Glaucoma Mother     Deep vein thrombosis Mother     Diabetes Father     Glaucoma Father     Hypertension Father     Diabetes Sister     Thyroid disease Sister     Diabetes Brother     Breast cancer Neg Hx     Ovarian cancer Neg Hx     Uterine cancer Neg Hx     Colon cancer Neg Hx     Pulmonary embolism Neg Hx     Malig Hyperthermia Neg Hx        Review of Systems   Constitutional:  Negative for chills, fever and unexpected weight loss.   Gastrointestinal:  Negative for abdominal pain.   Genitourinary:  Negative for dysuria, pelvic pain, vaginal bleeding and vaginal discharge.   All other systems reviewed and are negative.    /73   Pulse 74   Ht 152.4 cm (60\")   Wt 77.1 kg (170 lb)   LMP  (LMP Unknown)   BMI 33.20 kg/m²     Physical Exam  Constitutional:       General: She is not in acute distress.     Appearance: She is well-developed. She is obese.   Genitourinary:      Vulva, bladder, rectum and urethral meatus normal.      Right Labia: No lesions or Bartholin's cyst.     Left Labia: No lesions or Bartholin's cyst.     No inguinal adenopathy present in the right or left side.     No vaginal discharge or bleeding.      Anterior vaginal prolapse present.     Moderate vaginal atrophy present.       Right Adnexa: not tender, not full and no mass present.     Left Adnexa: not tender, not full and no mass present.     No cervical motion tenderness or friability.      No parametrium thickening present.     Uterus is not " enlarged or tender.      No uterine mass detected.  Rectum:      No rectal mass or abnormal anal tone.   Breasts:     Right: No inverted nipple, mass or nipple discharge.      Left: No inverted nipple, mass or nipple discharge.   HENT:      Head: Normocephalic and atraumatic.   Eyes:      Conjunctiva/sclera: Conjunctivae normal.      Pupils: Pupils are equal, round, and reactive to light.   Neck:      Thyroid: No thyromegaly.   Cardiovascular:      Rate and Rhythm: Normal rate and regular rhythm.      Heart sounds: Normal heart sounds. No murmur heard.  Pulmonary:      Effort: Pulmonary effort is normal. No respiratory distress.      Breath sounds: Normal breath sounds.   Abdominal:      General: Abdomen is flat. There is no distension.      Palpations: Abdomen is soft.      Tenderness: There is no abdominal tenderness.   Musculoskeletal:         General: No deformity. Normal range of motion.      Cervical back: Normal range of motion and neck supple.   Lymphadenopathy:      Lower Body: No right inguinal adenopathy. No left inguinal adenopathy.   Neurological:      Mental Status: She is alert and oriented to person, place, and time.   Skin:     General: Skin is warm and dry.      Findings: No erythema.   Psychiatric:         Behavior: Behavior normal.   Vitals reviewed. Exam conducted with a chaperone present.           Assessment     Diagnoses and all orders for this visit:    1. Encounter for gynecological examination without abnormal finding (Primary)    2. Urge incontinence    Other orders  -     estradiol (ESTRACE VAGINAL) 0.1 MG/GM vaginal cream; Insert 0.5 g into the vagina 3 (Three) Times a Week if Needed (atrophy).  Dispense: 42.5 g; Refill: 12  -     tolterodine LA (Detrol LA) 2 MG 24 hr capsule; Take 1 capsule by mouth Daily.  Dispense: 90 capsule; Refill: 3    1) GYN exam   Expectations reviewed.     2) incontinence  Discussed options   Anti-spasmodic vs vaginal estrogen   Urogynecology ??      Plan      1) Breast Health - Clinical breast exam & mammogram reviewed specifically American Cancer Society recommendations for screening specific to her, and Self breast awareness monthly  Mammogram pending   2) Pap - up to date   3) Smoking status- non-smoker   4) Colon health - screening colonoscopy up to date   5) Bone health - Weight bearing exercise, dietary calcium recommendations and vitamin D reviewed.   Repeat after 2024 (August)   6) Encouraged between 7-8 hours of good sleep per night.   7) Follow up prn and one year      Inocente Arredondo MD   9/8/2023  11:37 EDT

## 2023-09-11 ENCOUNTER — TELEPHONE (OUTPATIENT)
Dept: OBSTETRICS AND GYNECOLOGY | Facility: CLINIC | Age: 68
End: 2023-09-11
Payer: MEDICARE

## 2023-09-11 DIAGNOSIS — N64.89 BREAST ASYMMETRY: ICD-10-CM

## 2023-09-11 DIAGNOSIS — R92.8 ABNORMALITY OF RIGHT BREAST ON SCREENING MAMMOGRAM: Primary | ICD-10-CM

## 2023-09-11 NOTE — TELEPHONE ENCOUNTER
Pt is aware of appt at [facility] on 9/15/23 @ 930 & 1015am for DX Right Mammo & Right Complete US suggested from her screening mammogram.

## 2023-09-15 ENCOUNTER — APPOINTMENT (OUTPATIENT)
Dept: WOMENS IMAGING | Facility: HOSPITAL | Age: 68
End: 2023-09-15
Payer: MEDICARE

## 2023-09-15 PROCEDURE — 76641 ULTRASOUND BREAST COMPLETE: CPT | Performed by: RADIOLOGY

## 2023-09-15 PROCEDURE — G0279 TOMOSYNTHESIS, MAMMO: HCPCS | Performed by: RADIOLOGY

## 2023-09-15 PROCEDURE — 77065 DX MAMMO INCL CAD UNI: CPT | Performed by: RADIOLOGY

## 2023-09-18 ENCOUNTER — TELEPHONE (OUTPATIENT)
Dept: OBSTETRICS AND GYNECOLOGY | Facility: CLINIC | Age: 68
End: 2023-09-18
Payer: MEDICARE

## 2023-09-18 DIAGNOSIS — R92.8 ABNORMAL MAMMOGRAM: Primary | ICD-10-CM

## 2023-09-18 NOTE — TELEPHONE ENCOUNTER
Patient had a Right DX mammogram and US done on 9/15/23. Results in chart.    They are recommending a Right US guided BX.    Please review results in chart.  Thanks

## 2023-09-18 NOTE — PROGRESS NOTES
Zulma,. This is a duplicated, why is it not hooked to the one original order. They were supposed to have fixed this. Thanks, Dr. Arredondo

## 2023-09-18 NOTE — PROGRESS NOTES
Samia, she is aware. Right side - ultrasound directed biopsy of on the breast. For mass seen on Dx MMG and limited ultrasound. Can you help her arrange. Thanks, Dr. Arredondo

## 2023-09-27 ENCOUNTER — LAB REQUISITION (OUTPATIENT)
Dept: LAB | Facility: HOSPITAL | Age: 68
End: 2023-09-27
Payer: MEDICARE

## 2023-09-27 ENCOUNTER — APPOINTMENT (OUTPATIENT)
Dept: WOMENS IMAGING | Facility: HOSPITAL | Age: 68
End: 2023-09-27
Payer: MEDICARE

## 2023-09-27 DIAGNOSIS — N63.0 UNSPECIFIED LUMP IN UNSPECIFIED BREAST: ICD-10-CM

## 2023-09-27 PROCEDURE — A4648 IMPLANTABLE TISSUE MARKER: HCPCS | Performed by: RADIOLOGY

## 2023-09-27 PROCEDURE — 88305 TISSUE EXAM BY PATHOLOGIST: CPT | Performed by: OBSTETRICS & GYNECOLOGY

## 2023-09-27 PROCEDURE — 19083 BX BREAST 1ST LESION US IMAG: CPT | Performed by: RADIOLOGY

## 2023-09-28 ENCOUNTER — TELEPHONE (OUTPATIENT)
Dept: OBSTETRICS AND GYNECOLOGY | Facility: CLINIC | Age: 68
End: 2023-09-28
Payer: MEDICARE

## 2023-09-28 DIAGNOSIS — R92.8 ABNORMAL MAMMOGRAM: ICD-10-CM

## 2023-09-28 NOTE — PROGRESS NOTES
Samia, fibroadenoma per path. Left message to call office and discuss. Typically no treatment. Thanks, Dr. Arredondo

## 2023-09-28 NOTE — PROGRESS NOTES
Yanira, I left her a message to call and review breast biopsy results. Fibroadenoma seen, benign lesion, typically do not remove (send to surgery) unless they are disfiguring to the breast or painful.  Thanks, Dr. Arredondo

## 2023-09-28 NOTE — TELEPHONE ENCOUNTER
----- Message from Yanira Munson MA sent at 9/28/2023  1:17 PM EDT -----  Called pt again, no answer, phone just rang/bria  ----- Message -----  From: Inocente Arredondo MD  Sent: 9/28/2023  10:13 AM EDT  To: IBETH Salas, I left her a message to call and review breast biopsy results. Fibroadenoma seen, benign lesion, typically do not remove (send to surgery) unless they are disfiguring to the breast or painful.  Thanks, Dr. Arredondo

## 2023-09-29 ENCOUNTER — TELEPHONE (OUTPATIENT)
Dept: OBSTETRICS AND GYNECOLOGY | Facility: CLINIC | Age: 68
End: 2023-09-29
Payer: MEDICARE

## 2023-09-29 LAB
DX PRELIMINARY: NORMAL
LAB AP CASE REPORT: NORMAL
LAB AP DIAGNOSIS COMMENT: NORMAL
PATH REPORT.FINAL DX SPEC: NORMAL
PATH REPORT.GROSS SPEC: NORMAL

## 2023-09-29 NOTE — TELEPHONE ENCOUNTER
----- Message from Inocente Arredondo MD sent at 9/28/2023  5:05 PM EDT -----  Yanira, Biopsy confirmed fibroadenoma. I had them reach out based on path report. Thanks, Dr. Arredondo

## 2023-10-12 ENCOUNTER — LAB (OUTPATIENT)
Dept: ENDOCRINOLOGY | Age: 68
End: 2023-10-12
Payer: MEDICARE

## 2023-10-12 DIAGNOSIS — E10.21 TYPE 1 DIABETES MELLITUS WITH NEPHROPATHY: ICD-10-CM

## 2023-10-12 DIAGNOSIS — E55.9 VITAMIN D DEFICIENCY: ICD-10-CM

## 2023-10-12 DIAGNOSIS — E03.9 PRIMARY HYPOTHYROIDISM: ICD-10-CM

## 2023-10-12 DIAGNOSIS — M85.89 OSTEOPENIA OF MULTIPLE SITES: ICD-10-CM

## 2023-10-12 DIAGNOSIS — E78.5 HYPERLIPIDEMIA, UNSPECIFIED HYPERLIPIDEMIA TYPE: ICD-10-CM

## 2023-10-12 LAB
25(OH)D3+25(OH)D2 SERPL-MCNC: 46.1 NG/ML (ref 30–100)
ALBUMIN SERPL-MCNC: 4.2 G/DL (ref 3.5–5.2)
ALBUMIN/GLOB SERPL: 2.2 G/DL
ALP SERPL-CCNC: 108 U/L (ref 39–117)
ALT SERPL-CCNC: 15 U/L (ref 1–33)
AST SERPL-CCNC: 18 U/L (ref 1–32)
BILIRUB SERPL-MCNC: 0.4 MG/DL (ref 0–1.2)
BUN SERPL-MCNC: 19 MG/DL (ref 8–23)
BUN/CREAT SERPL: 11.5 (ref 7–25)
CALCIUM SERPL-MCNC: 10 MG/DL (ref 8.6–10.5)
CHLORIDE SERPL-SCNC: 105 MMOL/L (ref 98–107)
CHOLEST SERPL-MCNC: 140 MG/DL (ref 0–200)
CO2 SERPL-SCNC: 26 MMOL/L (ref 22–29)
CREAT SERPL-MCNC: 1.65 MG/DL (ref 0.57–1)
EGFRCR SERPLBLD CKD-EPI 2021: 34.1 ML/MIN/1.73
GLOBULIN SER CALC-MCNC: 1.9 GM/DL
GLUCOSE SERPL-MCNC: 134 MG/DL (ref 65–99)
HBA1C MFR BLD: 8.2 % (ref 4.8–5.6)
HDLC SERPL-MCNC: 60 MG/DL (ref 40–60)
IMP & REVIEW OF LAB RESULTS: NORMAL
LDLC SERPL CALC-MCNC: 61 MG/DL (ref 0–100)
POTASSIUM SERPL-SCNC: 4.9 MMOL/L (ref 3.5–5.2)
PROT SERPL-MCNC: 6.1 G/DL (ref 6–8.5)
SODIUM SERPL-SCNC: 138 MMOL/L (ref 136–145)
TRIGL SERPL-MCNC: 107 MG/DL (ref 0–150)
TSH SERPL DL<=0.005 MIU/L-ACNC: 2.69 UIU/ML (ref 0.27–4.2)
VLDLC SERPL CALC-MCNC: 19 MG/DL (ref 5–40)

## 2023-10-26 ENCOUNTER — OFFICE VISIT (OUTPATIENT)
Dept: ENDOCRINOLOGY | Age: 68
End: 2023-10-26
Payer: MEDICARE

## 2023-10-26 VITALS
HEIGHT: 60 IN | SYSTOLIC BLOOD PRESSURE: 116 MMHG | OXYGEN SATURATION: 96 % | BODY MASS INDEX: 34.51 KG/M2 | HEART RATE: 66 BPM | TEMPERATURE: 94.5 F | DIASTOLIC BLOOD PRESSURE: 66 MMHG | WEIGHT: 175.8 LBS

## 2023-10-26 DIAGNOSIS — Z96.41 INSULIN PUMP STATUS: ICD-10-CM

## 2023-10-26 DIAGNOSIS — I10 PRIMARY HYPERTENSION: ICD-10-CM

## 2023-10-26 DIAGNOSIS — E03.9 PRIMARY HYPOTHYROIDISM: ICD-10-CM

## 2023-10-26 DIAGNOSIS — M85.89 OSTEOPENIA OF MULTIPLE SITES: ICD-10-CM

## 2023-10-26 DIAGNOSIS — E78.5 HYPERLIPIDEMIA, UNSPECIFIED HYPERLIPIDEMIA TYPE: ICD-10-CM

## 2023-10-26 DIAGNOSIS — E10.21 TYPE 1 DIABETES MELLITUS WITH NEPHROPATHY: Primary | ICD-10-CM

## 2023-10-26 PROCEDURE — 99214 OFFICE O/P EST MOD 30 MIN: CPT | Performed by: INTERNAL MEDICINE

## 2023-10-26 PROCEDURE — 95251 CONT GLUC MNTR ANALYSIS I&R: CPT | Performed by: INTERNAL MEDICINE

## 2023-10-26 PROCEDURE — 3052F HG A1C>EQUAL 8.0%<EQUAL 9.0%: CPT | Performed by: INTERNAL MEDICINE

## 2023-10-26 RX ORDER — INSULIN GLULISINE 100 [IU]/ML
INJECTION, SOLUTION SUBCUTANEOUS
Qty: 90 ML | Refills: 2 | Status: SHIPPED | OUTPATIENT
Start: 2023-10-26

## 2023-10-26 RX ORDER — LEVOTHYROXINE SODIUM 50 MCG
50 TABLET ORAL DAILY
Qty: 90 TABLET | Refills: 2 | Status: SHIPPED | OUTPATIENT
Start: 2023-10-26

## 2023-10-26 NOTE — PROGRESS NOTES
Subjective   Dayanna Lewis is a 68 y.o. female.     History of Present Illness        She has known diabetes mellitus for more than 40 years and started on insulin about 35 years ago. She started on insulin pump in 2011. SUSAN antibodies is negative and C-peptide has been undetectable. She is using a Medtronic 670 pump with Medtronic sensor. She is using Apidra insulin on the pump.  Hemoglobin A1c done in 10/23 is 8.2%.     Basal rates:  1.55     Carbohydrate ratio: 12 AM-10.0.  5 PM-11.0.     Insulin sensitivity: 42  Target: 110-130.  Active insulin time 3 hours.     Sensor data from October 13, 2023 through October 26, 2023 reviewed.  Time in range 73%.  Time above range 22%.  Time below range 5%.  Average glucose 144 mg per DL.  GMI 6.8%.  Auto mode 47%.  Total daily dose 47.8 units.  Total bolus 16.4 units.    Date and time settings on the pump is 12 hours ahead.     Her last eye examination was in December 2022 done by Dr. Lentz. She has no retinopathy. Urine microalbumin was normal in 4/23. She denies numbness, tingling or burning in her hands or feet.     She has CKD and is seeing Dr. Walker.  She is on vit D3 1000 units/day.  25 hydroxy vitamin D done in October 2023 is normal at 46.1 ng/mL.     She has osteoporosis and was on an unknown medication for 5 years. She was taken off Evista prescribed by Dr. Martines in 2021.       Bone density done on August 4, 2022 at [facility] imaging showed osteopenia of multiple sites.  10-year risk of major osteoporotic fracture is 16% and of hip fracture is 2.2%.  She will be following with Dr. Arredondo.     She has hypothyroidism and is on Synthroid 50 mcg/day. She has no history of goiter or head/neck radiation therapy. She has gained 4 pounds since 7/23.  TSH done in October 2023 is normal at 2.69.     She has hyperlipidemia and is on Crestor 10 mg/day. She denies myalgia. Lipid panel done in October 2023 are as follows: Cholesterol 140.  HDL 60.  LDL 61.  Triglycerides  "107.     She has hypertension and is on Bystolic 10 mg/day and losartan 25 mg/day. She has no history of heart attack or stroke. She denies chest pain or shortness of breath..    The following portions of the patient's history were reviewed and updated as appropriate: allergies, current medications, past family history, past medical history, past social history, past surgical history, and problem list.    Review of Systems   Eyes:  Negative for visual disturbance.   Respiratory:  Negative for shortness of breath.    Cardiovascular:  Negative for chest pain and palpitations.   Gastrointestinal: Negative.    Endocrine: Negative for polyuria.   Genitourinary: Negative.    Musculoskeletal:  Negative for myalgias.   Neurological:  Negative for numbness.     Vitals:    10/26/23 0952   BP: 116/66   Pulse: 66   Temp: 94.5 °F (34.7 °C)   TempSrc: Temporal   SpO2: 96%   Weight: 79.7 kg (175 lb 12.8 oz)   Height: 152.4 cm (60\")      Objective   Physical Exam  Constitutional:       General: She is not in acute distress.     Appearance: Normal appearance. She is not ill-appearing.   Eyes:      General: No scleral icterus.        Right eye: No discharge.         Left eye: No discharge.   Neck:      Vascular: No carotid bruit.   Cardiovascular:      Rate and Rhythm: Normal rate and regular rhythm.      Heart sounds: Normal heart sounds. No murmur heard.  Pulmonary:      Breath sounds: Normal breath sounds. No rales.   Chest:      Chest wall: No tenderness.   Abdominal:      General: Bowel sounds are normal.      Palpations: Abdomen is soft.      Tenderness: There is no right CVA tenderness or left CVA tenderness.   Musculoskeletal:      Right lower leg: No edema.      Left lower leg: Edema present.   Lymphadenopathy:      Cervical: No cervical adenopathy.   Neurological:      Mental Status: She is alert and oriented to person, place, and time.      Cranial Nerves: No cranial nerve deficit.      Sensory: No sensory deficit.      " Comments: Intact light touch on lower ext       Lab on 10/12/2023   Component Date Value Ref Range Status    25 Hydroxy, Vitamin D 10/12/2023 46.1  30.0 - 100.0 ng/ml Final    Comment: Reference Range for Total Vitamin D 25(OH)  Deficiency <20.0 ng/mL  Insufficiency 21-29 ng/mL  Sufficiency  ng/mL  Toxicity >100 ng/ml      TSH 10/12/2023 2.690  0.270 - 4.200 uIU/mL Final    Hemoglobin A1C 10/12/2023 8.20 (H)  4.80 - 5.60 % Final    Comment: Hemoglobin A1C Ranges:  Increased Risk for Diabetes  5.7% to 6.4%  Diabetes                     >= 6.5%  Diabetic Goal                < 7.0%      Total Cholesterol 10/12/2023 140  0 - 200 mg/dL Final    Comment: Cholesterol Reference Ranges  (U.S. Department of Health and Human Services ATP III  Classifications)  Desirable          <200 mg/dL  Borderline High    200-239 mg/dL  High Risk          >240 mg/dL  Triglyceride Reference Ranges  (U.S. Department of Health and Human Services ATP III  Classifications)  Normal           <150 mg/dL  Borderline High  150-199 mg/dL  High             200-499 mg/dL  Very High        >500 mg/dL  HDL Reference Ranges  (U.S. Department of Health and Human Services ATP III  Classifications)  Low     <40 mg/dl (major risk factor for CHD)  High    >60 mg/dl ('negative' risk factor for CHD)  LDL Reference Ranges  (U.S. Department of Health and Human Services ATP III  Classifications)  Optimal          <100 mg/dL  Near Optimal     100-129 mg/dL  Borderline High  130-159 mg/dL  High             160-189 mg/dL  Very High        >189 mg/dL      Triglycerides 10/12/2023 107  0 - 150 mg/dL Final    HDL Cholesterol 10/12/2023 60  40 - 60 mg/dL Final    VLDL Cholesterol Galen 10/12/2023 19  5 - 40 mg/dL Final    LDL Chol Calc (NIH) 10/12/2023 61  0 - 100 mg/dL Final    Glucose 10/12/2023 134 (H)  65 - 99 mg/dL Final    BUN 10/12/2023 19  8 - 23 mg/dL Final    Creatinine 10/12/2023 1.65 (H)  0.57 - 1.00 mg/dL Final    EGFR Result 10/12/2023 34.1 (L)  >60.0  mL/min/1.73 Final    Comment: GFR Normal >60  Chronic Kidney Disease <60  Kidney Failure <15      BUN/Creatinine Ratio 10/12/2023 11.5  7.0 - 25.0 Final    Sodium 10/12/2023 138  136 - 145 mmol/L Final    Potassium 10/12/2023 4.9  3.5 - 5.2 mmol/L Final    Chloride 10/12/2023 105  98 - 107 mmol/L Final    Total CO2 10/12/2023 26.0  22.0 - 29.0 mmol/L Final    Calcium 10/12/2023 10.0  8.6 - 10.5 mg/dL Final    Total Protein 10/12/2023 6.1  6.0 - 8.5 g/dL Final    Albumin 10/12/2023 4.2  3.5 - 5.2 g/dL Final    Globulin 10/12/2023 1.9  gm/dL Final    A/G Ratio 10/12/2023 2.2  g/dL Final    Total Bilirubin 10/12/2023 0.4  0.0 - 1.2 mg/dL Final    Alkaline Phosphatase 10/12/2023 108  39 - 117 U/L Final    AST (SGOT) 10/12/2023 18  1 - 32 U/L Final    ALT (SGPT) 10/12/2023 15  1 - 33 U/L Final    Interpretation 10/12/2023 Note   Final    Supplemental report is available.     Assessment & Plan   Diagnoses and all orders for this visit:    1. Type 1 diabetes mellitus with nephropathy (Primary)  -     Comprehensive Metabolic Panel; Future  -     Hemoglobin A1c; Future    2. Insulin pump status    3. Osteopenia of multiple sites  -     Comprehensive Metabolic Panel; Future  -     Vitamin D,25-Hydroxy; Future    4. Primary hypothyroidism  -     TSH; Future    5. Hyperlipidemia, unspecified hyperlipidemia type  -     Lipid Panel; Future    6. Primary hypertension  -     Comprehensive Metabolic Panel; Future      Pump date and time were reset.  Sensor download in 2 to 3 weeks.  Continue vitamin D3 1000 units/day.  Follow-up with Dr. Arredondo for treatment of osteopenia.  Continue Synthroid 50 mcg/day.  Continue Crestor 10 mg/day.  Continue Bystolic 10 mg/day and losartan 25 mg/day.    Copy of my note sent to Dr. Ellis.    RTC 3 mos with GHADA Trejo NP.

## 2024-01-12 ENCOUNTER — LAB (OUTPATIENT)
Dept: ENDOCRINOLOGY | Age: 69
End: 2024-01-12
Payer: MEDICARE

## 2024-01-12 DIAGNOSIS — I10 PRIMARY HYPERTENSION: ICD-10-CM

## 2024-01-12 DIAGNOSIS — M85.89 OSTEOPENIA OF MULTIPLE SITES: ICD-10-CM

## 2024-01-12 DIAGNOSIS — E10.21 TYPE 1 DIABETES MELLITUS WITH NEPHROPATHY: ICD-10-CM

## 2024-01-12 DIAGNOSIS — E78.5 HYPERLIPIDEMIA, UNSPECIFIED HYPERLIPIDEMIA TYPE: ICD-10-CM

## 2024-01-12 DIAGNOSIS — E03.9 PRIMARY HYPOTHYROIDISM: ICD-10-CM

## 2024-01-13 LAB
25(OH)D3+25(OH)D2 SERPL-MCNC: 46.9 NG/ML (ref 30–100)
ALBUMIN SERPL-MCNC: 4.4 G/DL (ref 3.5–5.2)
ALBUMIN/GLOB SERPL: 2.9 G/DL
ALP SERPL-CCNC: 117 U/L (ref 39–117)
ALT SERPL-CCNC: 14 U/L (ref 1–33)
AST SERPL-CCNC: 16 U/L (ref 1–32)
BILIRUB SERPL-MCNC: 0.4 MG/DL (ref 0–1.2)
BUN SERPL-MCNC: 16 MG/DL (ref 8–23)
BUN/CREAT SERPL: 10.7 (ref 7–25)
CALCIUM SERPL-MCNC: 9.3 MG/DL (ref 8.6–10.5)
CHLORIDE SERPL-SCNC: 103 MMOL/L (ref 98–107)
CHOLEST SERPL-MCNC: 150 MG/DL (ref 0–200)
CO2 SERPL-SCNC: 25.6 MMOL/L (ref 22–29)
CREAT SERPL-MCNC: 1.49 MG/DL (ref 0.57–1)
EGFRCR SERPLBLD CKD-EPI 2021: 38.3 ML/MIN/1.73
GLOBULIN SER CALC-MCNC: 1.5 GM/DL
GLUCOSE SERPL-MCNC: 163 MG/DL (ref 65–99)
HBA1C MFR BLD: 8.3 % (ref 4.8–5.6)
HDLC SERPL-MCNC: 55 MG/DL (ref 40–60)
IMP & REVIEW OF LAB RESULTS: NORMAL
LDLC SERPL CALC-MCNC: 71 MG/DL (ref 0–100)
POTASSIUM SERPL-SCNC: 4.4 MMOL/L (ref 3.5–5.2)
PROT SERPL-MCNC: 5.9 G/DL (ref 6–8.5)
SODIUM SERPL-SCNC: 138 MMOL/L (ref 136–145)
TRIGL SERPL-MCNC: 136 MG/DL (ref 0–150)
TSH SERPL DL<=0.005 MIU/L-ACNC: 2.65 UIU/ML (ref 0.27–4.2)
VLDLC SERPL CALC-MCNC: 24 MG/DL (ref 5–40)

## 2024-01-26 ENCOUNTER — OFFICE VISIT (OUTPATIENT)
Dept: ENDOCRINOLOGY | Age: 69
End: 2024-01-26
Payer: MEDICARE

## 2024-01-26 VITALS
HEIGHT: 60 IN | OXYGEN SATURATION: 100 % | SYSTOLIC BLOOD PRESSURE: 124 MMHG | DIASTOLIC BLOOD PRESSURE: 80 MMHG | HEART RATE: 62 BPM | BODY MASS INDEX: 34.24 KG/M2 | WEIGHT: 174.4 LBS | TEMPERATURE: 97.1 F

## 2024-01-26 DIAGNOSIS — E78.5 HYPERLIPIDEMIA, UNSPECIFIED HYPERLIPIDEMIA TYPE: ICD-10-CM

## 2024-01-26 DIAGNOSIS — E10.21 TYPE 1 DIABETES MELLITUS WITH NEPHROPATHY: Primary | ICD-10-CM

## 2024-01-26 DIAGNOSIS — E03.9 PRIMARY HYPOTHYROIDISM: ICD-10-CM

## 2024-01-26 NOTE — PROGRESS NOTES
Chief Complaint  Chief Complaint   Patient presents with    Diabetes     Type 1: Pt pump is attached, is up to date on eye exam, no hx of retinopathy or neuropathy.        Subjective          History of Present Illness    Dayanna Lewis 68 y.o. presents for a follow-up evaluation for type 1 DM     She has been diabetic for more than 40 years and started insulin about 35 years ago     She started on insulin pump in 2011     Pt is on the following medications for their DM: Apidria via Rextertronic 670 pump with sensor            Pump Settings     Basal : 1.55        Carb ratio:  0000- 1700 is 1 to 10                      1300- 000 1 to 11                           Sensitivty : 42                              Target : 110-130        Active Insulin Time: 3 hours        Denies constipation, chest pain, shortness of breath, vision changes or numbness and tingling in feet/hands.    Weight stable since last visit.    Pt denies a history of diabetic retinopathy.  Last eye exam was Dec 2023     Pt does have a history of nephropathy.  Patient is currently taking ARB.  Follows with Dr. Medhat Walker     Pt does not have neuropathy.       Pt denies a history of CAD or CVA.    Last A1C in 01/24 was 8.3    Last microalbumin in 04/23 was negative          Blood Sugars    Blood glucoses are checked 2-4/day.    Fasting blood glucoses: mid 100s    Pre-meal blood glucoses: low 100s - 200s    Pt has rare episodes of hypoglycemia.          Sensor Data    Time in range 84%  High 15%  Very high 0%  Low 1%  Very low 0%      Average Glucose - 146 mg/dL      Sensor Wear - 93 %          Time in Auto Mode - 91 %  Time in Manual Mode - 5 %              Hypothyroid     Pt complains of dry skin (no change)     Denies constipation, chest pain, palpitations, heat intolerance or cold intolerance.     Current treatment is branded Synthroid 50 mcg daily    Last labs in 01/24 showed TSH 2.650               Hyperlipidemia     Pt is currently taking Crestor  "10 mg HS     Last lipid panel in 01/24 showed Total 150, HDL 55, LDL 71 and Triglycerides 136            Hypertension with CKD Stage 3b     Pt denies any chest pain, palpitations, shortness of breath, or headache     Current regimen includes Bystolic 10 mg daily and losartan 25 mg daily              I have reviewed the patient's allergies, medicines, past medical hx, family hx and social hx.    Objective   Vital Signs:   /80   Pulse 62   Temp 97.1 °F (36.2 °C) (Temporal)   Ht 152.4 cm (60\")   Wt 79.1 kg (174 lb 6.4 oz)   LMP  (LMP Unknown)   SpO2 100%   BMI 34.06 kg/m²       Physical Exam   Physical Exam  Constitutional:       General: She is not in acute distress.     Appearance: Normal appearance. She is not diaphoretic.   HENT:      Head: Normocephalic and atraumatic.   Eyes:      General:         Right eye: No discharge.         Left eye: No discharge.   Skin:     General: Skin is warm and dry.   Neurological:      Mental Status: She is alert.   Psychiatric:         Mood and Affect: Mood normal.         Behavior: Behavior normal.                    Results Review:   Hemoglobin A1C   Date Value Ref Range Status   01/12/2024 8.30 (H) 4.80 - 5.60 % Final     Comment:     Hemoglobin A1C Ranges:  Increased Risk for Diabetes  5.7% to 6.4%  Diabetes                     >= 6.5%  Diabetic Goal                < 7.0%       Triglycerides   Date Value Ref Range Status   01/12/2024 136 0 - 150 mg/dL Final     HDL Cholesterol   Date Value Ref Range Status   01/12/2024 55 40 - 60 mg/dL Final     LDL Chol Calc (NIH)   Date Value Ref Range Status   01/12/2024 71 0 - 100 mg/dL Final     VLDL Cholesterol Galen   Date Value Ref Range Status   01/12/2024 24 5 - 40 mg/dL Final         Assessment and Plan {CC Problem List  Visit Diagnosis  ROS  Review (Popup)  Health Maintenance  Quality  BestPractice  Medications  SmartSets  SnapShot Encounters  Media :23  Diagnoses and all orders for this visit:    1. Type 1 " diabetes mellitus with nephropathy (Primary)  -     Hemoglobin A1c; Future  -     Comprehensive Metabolic Panel; Future  -     Microalbumin / Creatinine Urine Ratio - Urine, Clean Catch; Future    Not much change in A1c at 8.3%  Kidney function stable  Continue with Apidria via Medtronic 670 pump with sensor with current settings  Advised to bolus 10-15 minutes before eating to help with the majority of highs  Continue with BG checks  Pt was having issues with sensor in Dec - but recently got a new sensor and it has been doing better  Pt states that she can get a new pump in June 2024 - will talk more about up grading to 780G      2. Primary hypothyroidism  -     TSH Rfx On Abnormal To Free T4; Future    Thyroid labs are good.    Continue with branded Synthroid 50 mcg daily      3. Hyperlipidemia, unspecified hyperlipidemia type  -     Comprehensive Metabolic Panel; Future  -     Lipid Panel; Future    Lipid panel is good.    Continue with statin.          No refills needed at this time        RTC on 04/26/24 with Dr. Stokes and labs on 04/12/24      Follow Up     Patient was given instructions and counseling regarding her condition or for health maintenance advice. Please see specific information pulled into the AVS if appropriate.              Silke Trejo, GABRIELA  01/26/24

## 2024-03-13 RX ORDER — PERPHENAZINE 16 MG/1
TABLET, FILM COATED ORAL
Qty: 800 EACH | Refills: 3 | OUTPATIENT
Start: 2024-03-13

## 2024-03-13 NOTE — TELEPHONE ENCOUNTER
Rx Refill Note  Requested Prescriptions     Pending Prescriptions Disp Refills    Contour Next Test test strip [Pharmacy Med Name: CONTOUR NEXT BLOOD GLUCOSE TEST   Strip] 800 each 3     Sig: TEST BLOOD SUGAR 8 TIMES DAILY      Last office visit with prescribing clinician: 10/26/2023   Last telemedicine visit with prescribing clinician: Visit date not found   Next office visit with prescribing clinician: 4/26/2024                         Would you like a call back once the refill request has been completed: [] Yes [] No    If the office needs to give you a call back, can they leave a voicemail: [] Yes [] No    Marleni Vance  03/13/24, 07:44 EDT

## 2024-03-15 ENCOUNTER — PRIOR AUTHORIZATION (OUTPATIENT)
Dept: ENDOCRINOLOGY | Age: 69
End: 2024-03-15
Payer: MEDICARE

## 2024-03-15 NOTE — TELEPHONE ENCOUNTER
Received a PA request from [employer] for test strips.   I sent pt a [employer] message that she needed to contact her insurance company to see which brand they will cover.

## 2024-04-26 ENCOUNTER — OFFICE VISIT (OUTPATIENT)
Dept: ENDOCRINOLOGY | Age: 69
End: 2024-04-26
Payer: MEDICARE

## 2024-04-26 VITALS
DIASTOLIC BLOOD PRESSURE: 70 MMHG | BODY MASS INDEX: 34.44 KG/M2 | SYSTOLIC BLOOD PRESSURE: 142 MMHG | OXYGEN SATURATION: 99 % | HEART RATE: 64 BPM | WEIGHT: 175.4 LBS | HEIGHT: 60 IN | TEMPERATURE: 97.3 F

## 2024-04-26 DIAGNOSIS — E10.21 TYPE 1 DIABETES MELLITUS WITH NEPHROPATHY: Primary | ICD-10-CM

## 2024-04-26 DIAGNOSIS — I10 ESSENTIAL HYPERTENSION: ICD-10-CM

## 2024-04-26 DIAGNOSIS — Z96.41 INSULIN PUMP STATUS: ICD-10-CM

## 2024-04-26 DIAGNOSIS — E78.5 HYPERLIPIDEMIA, UNSPECIFIED HYPERLIPIDEMIA TYPE: ICD-10-CM

## 2024-04-26 DIAGNOSIS — M85.89 OSTEOPENIA OF MULTIPLE SITES: ICD-10-CM

## 2024-04-26 PROCEDURE — 3051F HG A1C>EQUAL 7.0%<8.0%: CPT | Performed by: INTERNAL MEDICINE

## 2024-04-26 PROCEDURE — 95251 CONT GLUC MNTR ANALYSIS I&R: CPT | Performed by: INTERNAL MEDICINE

## 2024-04-26 PROCEDURE — 99214 OFFICE O/P EST MOD 30 MIN: CPT | Performed by: INTERNAL MEDICINE

## 2024-04-26 NOTE — PROGRESS NOTES
Subjective   Dayanna Lewis is a 68 y.o. female.     History of Present Illness     She has known diabetes mellitus for more than 40 years and started on insulin about 35 years ago. She started on insulin pump in 2011. SUSAN antibodies is negative and C-peptide has been undetectable.  Hemoglobin A1c done in April 2024 is 7.7%.    She is using a Medtronic 670 pump with Medtronic Guardian 3 sensor. She is using Apidra insulin on the pump.  She qualifies for pump upgrade in June 2024.     Basal rates:  1.55     Carbohydrate ratio: 12 AM-10.0.  5 PM-11.0.     Insulin sensitivity: 42  Target: 110-130.  Active insulin time 3 hours.     Sensor data April 15, 2024 through April 26, 2024 reviewed.  Time in target 79%.  Time above target 20%.  Time below target 1%.  Auto mode 87%.  Average glucose 144 mg per DL.  GMI 6.8%.  Total daily dose 48 units.  Bolus amount 16.7 units.  Auto basal 31.38 units.  She has no early morning hypoglycemia.  Fasting glucoses 110 or higher.  She has intermittent postprandial hyperglycemia mostly after supper.     Her last eye examination was in 3/24. She has no retinopathy. Urine microalbumin was normal in 4/24. She denies numbness, tingling or burning in her hands or feet.     She has CKD and is seeing Dr. Walker.  Urine microalbumin is normal in April 2024.  She is on losartan 25 mg/day and vit D3 1000 units/day.     She has osteoporosis and was on an unknown medication for 5 years. She was taken off Evista prescribed by Dr. Martines in 2021.       Bone density done on August 4, 2022 at [facility] imaging showed osteopenia of multiple sites.  10-year risk of major osteoporotic fracture is 16% and of hip fracture is 2.2%.  She is on Citracal plus D 1200 1 tab prescribed by Dr. Arredondo.     She has hypothyroidism and is on Synthroid 50 mcg/day. She has no history of goiter or head/neck radiation therapy. She has no significant weight change since 1/24.  TSH done in April 2024 is normal at 2.0.     She  "has hyperlipidemia and is on Crestor 10 mg/day. She denies myalgia.  Lipid panel done in April 2024 are as follows: Cholesterol 146.  HDL 57.  LDL 73.  Triglycerides 87.     She has hypertension and is on Bystolic 10 mg/day and losartan 25 mg/day. She has no history of heart attack or stroke. She denies chest pain or shortness of breath.    She is undergoing ENT evaluation for hearing loss.    The following portions of the patient's history were reviewed and updated as appropriate: allergies, current medications, past family history, past medical history, past social history, past surgical history, and problem list.    Review of Systems   HENT:  Positive for hearing loss.    Respiratory:  Negative for shortness of breath.    Cardiovascular:  Negative for chest pain and palpitations.   Gastrointestinal: Negative.    Genitourinary: Negative.    Musculoskeletal:  Negative for myalgias.   Neurological:  Negative for seizures and numbness.     Vitals:    04/26/24 1006   BP: 142/70   Pulse: 64   Temp: 97.3 °F (36.3 °C)   TempSrc: Temporal   SpO2: 99%   Weight: 79.6 kg (175 lb 6.4 oz)   Height: 152.4 cm (60\")      Objective   Physical Exam  Constitutional:       General: She is not in acute distress.     Appearance: Normal appearance. She is obese. She is not ill-appearing.   Eyes:      General: No scleral icterus.        Right eye: No discharge.         Left eye: No discharge.      Extraocular Movements: Extraocular movements intact.      Conjunctiva/sclera: Conjunctivae normal.   Neck:      Vascular: No carotid bruit.   Cardiovascular:      Rate and Rhythm: Normal rate.   Pulmonary:      Effort: No respiratory distress.      Breath sounds: Normal breath sounds.   Abdominal:      Palpations: Abdomen is soft.      Tenderness: There is no abdominal tenderness.   Musculoskeletal:      Right lower leg: No edema.      Left lower leg: No edema.      Comments: Feet warm.  No cyanosis or pedal edema.  No plantar ulcers.  Plantar " calluses on both first metatarsal area.   Lymphadenopathy:      Cervical: No cervical adenopathy.   Neurological:      Mental Status: She is alert and oriented to person, place, and time.      Comments: Intact light touch in lower extremities.       Results Encounter on 04/12/2024   Component Date Value Ref Range Status    Hemoglobin A1C 04/12/2024 7.70 (H)  4.80 - 5.60 % Final    Comment: Hemoglobin A1C Ranges:  Increased Risk for Diabetes  5.7% to 6.4%  Diabetes                     >= 6.5%  Diabetic Goal                < 7.0%      Glucose 04/12/2024 119 (H)  65 - 99 mg/dL Final    BUN 04/12/2024 15  8 - 23 mg/dL Final    Creatinine 04/12/2024 1.61 (H)  0.57 - 1.00 mg/dL Final    EGFR Result 04/12/2024 34.9 (L)  >60.0 mL/min/1.73 Final    Comment: GFR Normal >60  Chronic Kidney Disease <60  Kidney Failure <15      BUN/Creatinine Ratio 04/12/2024 9.3  7.0 - 25.0 Final    Sodium 04/12/2024 143  136 - 145 mmol/L Final    Potassium 04/12/2024 4.5  3.5 - 5.2 mmol/L Final    Chloride 04/12/2024 107  98 - 107 mmol/L Final    Total CO2 04/12/2024 27.1  22.0 - 29.0 mmol/L Final    Calcium 04/12/2024 9.3  8.6 - 10.5 mg/dL Final    Total Protein 04/12/2024 6.2  6.0 - 8.5 g/dL Final    Albumin 04/12/2024 4.2  3.5 - 5.2 g/dL Final    Globulin 04/12/2024 2.0  gm/dL Final    A/G Ratio 04/12/2024 2.1  g/dL Final    Total Bilirubin 04/12/2024 0.4  0.0 - 1.2 mg/dL Final    Alkaline Phosphatase 04/12/2024 109  39 - 117 U/L Final    AST (SGOT) 04/12/2024 15  1 - 32 U/L Final    ALT (SGPT) 04/12/2024 14  1 - 33 U/L Final    Creatinine, Urine 04/12/2024 50.7  Not Estab. mg/dL Final    Microalbumin, Urine 04/12/2024 <3.0  Not Estab. ug/mL Final    Microalbumin/Creatinine Ratio 04/12/2024 <6  0 - 29 mg/g creat Final    Comment:                        Normal:                0 -  29                         Moderately increased: 30 - 300                         Severely increased:       >300      Total Cholesterol 04/12/2024 146  0 - 200  mg/dL Final    Comment: Cholesterol Reference Ranges  (U.S. Department of Health and Human Services ATP III  Classifications)  Desirable          <200 mg/dL  Borderline High    200-239 mg/dL  High Risk          >240 mg/dL  Triglyceride Reference Ranges  (U.S. Department of Health and Human Services ATP III  Classifications)  Normal           <150 mg/dL  Borderline High  150-199 mg/dL  High             200-499 mg/dL  Very High        >500 mg/dL  HDL Reference Ranges  (U.S. Department of Health and Human Services ATP III  Classifications)  Low     <40 mg/dl (major risk factor for CHD)  High    >60 mg/dl ('negative' risk factor for CHD)  LDL Reference Ranges  (U.S. Department of Health and Human Services ATP III  Classifications)  Optimal          <100 mg/dL  Near Optimal     100-129 mg/dL  Borderline High  130-159 mg/dL  High             160-189 mg/dL  Very High        >189 mg/dL      Triglycerides 04/12/2024 87  0 - 150 mg/dL Final    HDL Cholesterol 04/12/2024 57  40 - 60 mg/dL Final    VLDL Cholesterol Galen 04/12/2024 16  5 - 40 mg/dL Final    LDL Chol Calc (NIH) 04/12/2024 73  0 - 100 mg/dL Final    TSH 04/12/2024 2.000  0.270 - 4.200 uIU/mL Final    Interpretation 04/12/2024 Note   Final    Supplemental report is available.     Assessment & Plan   Diagnoses and all orders for this visit:    1. 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 Insulin pump status    3. Osteopenia of multiple sites    4. Hyperlipidemia, unspecified hyperlipidemia type    5. Essential hypertension      Continue on current insulin pump settings.  Upgrade to Medtronic 780 G with Guardian 4 sensor in June 2024.    Continue Citracal plus D 1 tablet daily.  Suggest follow-up bone density in August 2024 will defer decision to Dr. Arredondo..    Continue Synthroid 50 mcg/day.    Continue Crestor 10 mg/day.    Will defer treatment of hypertension to Dr. Ellis.  Continue Bystolic and losartan.    Copy of my note sent to Dr. Ellis,   Arnulfo, and Dr. Walker.    RTC 3 mos with GHADA Trejo NP

## 2024-05-17 ENCOUNTER — PATIENT MESSAGE (OUTPATIENT)
Dept: ENDOCRINOLOGY | Age: 69
End: 2024-05-17
Payer: MEDICARE

## 2024-06-04 RX ORDER — LEVOTHYROXINE SODIUM 50 MCG
50 TABLET ORAL DAILY
Qty: 90 TABLET | Refills: 2 | Status: SHIPPED | OUTPATIENT
Start: 2024-06-04

## 2024-06-04 NOTE — TELEPHONE ENCOUNTER
Rx Refill Note  Requested Prescriptions     Pending Prescriptions Disp Refills    Synthroid 50 MCG tablet 90 tablet 2     Sig: Take 1 tablet by mouth Daily.      Last office visit with prescribing clinician: 4/26/2024   Last telemedicine visit with prescribing clinician: Visit date not found   Next office visit with prescribing clinician: 10/24/2024                         Would you like a call back once the refill request has been completed: [] Yes [] No    If the office needs to give you a call back, can they leave a voicemail: [] Yes [] No    Marleni Vance  06/04/24, 08:19 EDT

## 2024-06-21 ENCOUNTER — TELEPHONE (OUTPATIENT)
Dept: OBSTETRICS AND GYNECOLOGY | Facility: CLINIC | Age: 69
End: 2024-06-21
Payer: MEDICARE

## 2024-06-21 DIAGNOSIS — M85.851 OSTEOPENIA OF BOTH HIPS: Primary | ICD-10-CM

## 2024-06-21 DIAGNOSIS — M85.852 OSTEOPENIA OF BOTH HIPS: Primary | ICD-10-CM

## 2024-06-21 NOTE — TELEPHONE ENCOUNTER
Penelope,     Order in Epic.     Thanks,   Dr. Arredondo    Patient is needing her bone density order placed and sent to [facility] on [address], thanks!    Dr. Arredondo, do you mind to place order?

## 2024-06-26 DIAGNOSIS — E78.5 HYPERLIPIDEMIA, UNSPECIFIED HYPERLIPIDEMIA TYPE: ICD-10-CM

## 2024-06-26 RX ORDER — ROSUVASTATIN CALCIUM 10 MG/1
TABLET, COATED ORAL
Qty: 90 TABLET | Refills: 1 | Status: SHIPPED | OUTPATIENT
Start: 2024-06-26

## 2024-06-26 NOTE — TELEPHONE ENCOUNTER
Rx Refill Note  Requested Prescriptions     Pending Prescriptions Disp Refills    rosuvastatin (CRESTOR) 10 MG tablet [Pharmacy Med Name: ROSUVASTATIN 10MG TABLETS] 90 tablet 3     Sig: TAKE 1 TABLET BY MOUTH EVERY NIGHT AT BEDTIME      Last office visit with prescribing clinician: 1/26/2024   Last telemedicine visit with prescribing clinician: Visit date not found   Next office visit with prescribing clinician: 7/26/2024                         Would you like a call back once the refill request has been completed: [] Yes [] No    If the office needs to give you a call back, can they leave a voicemail: [] Yes [] No    Marleni Vance  06/26/24, 08:02 EDT

## 2024-06-27 ENCOUNTER — PATIENT MESSAGE (OUTPATIENT)
Dept: ENDOCRINOLOGY | Age: 69
End: 2024-06-27
Payer: MEDICARE

## 2024-07-02 DIAGNOSIS — E10.22 TYPE 1 DIABETES MELLITUS WITH STAGE 3B CHRONIC KIDNEY DISEASE: ICD-10-CM

## 2024-07-02 DIAGNOSIS — N18.32 TYPE 1 DIABETES MELLITUS WITH STAGE 3B CHRONIC KIDNEY DISEASE: ICD-10-CM

## 2024-07-02 RX ORDER — LANCETS 30 GAUGE
EACH MISCELLANEOUS
Qty: 500 EACH | Refills: 4 | Status: SHIPPED | OUTPATIENT
Start: 2024-07-02 | End: 2024-07-03 | Stop reason: ALTCHOICE

## 2024-07-02 NOTE — TELEPHONE ENCOUNTER
Rx Refill Note  Requested Prescriptions     Pending Prescriptions Disp Refills    glucose blood test strip 400 each 2     Sig: [employer] Contour Next Test Strips - use to check blood sugars 4 times daily    Lancets misc 500 each 4     Sig: Dispense based on insurance preference: Check 4-6 times a day. Dx: E 10.22      Last office visit with prescribing clinician: 1/26/2024   Last telemedicine visit with prescribing clinician: Visit date not found   Next office visit with prescribing clinician: 7/26/2024                         Would you like a call back once the refill request has been completed: [] Yes [] No    If the office needs to give you a call back, can they leave a voicemail: [] Yes [] No    Marleni Vance  07/02/24, 08:00 EDT

## 2024-07-03 RX ORDER — LANCETS
EACH MISCELLANEOUS
Qty: 400 EACH | Refills: 3 | Status: SHIPPED | OUTPATIENT
Start: 2024-07-03

## 2024-07-11 ENCOUNTER — TELEPHONE (OUTPATIENT)
Dept: ENDOCRINOLOGY | Age: 69
End: 2024-07-11

## 2024-07-11 DIAGNOSIS — E10.21 TYPE 1 DIABETES MELLITUS WITH NEPHROPATHY: Primary | ICD-10-CM

## 2024-07-11 DIAGNOSIS — M85.89 OSTEOPENIA OF MULTIPLE SITES: ICD-10-CM

## 2024-07-11 DIAGNOSIS — E03.9 PRIMARY HYPOTHYROIDISM: ICD-10-CM

## 2024-07-11 DIAGNOSIS — E78.5 HYPERLIPIDEMIA, UNSPECIFIED HYPERLIPIDEMIA TYPE: ICD-10-CM

## 2024-07-12 ENCOUNTER — LAB (OUTPATIENT)
Dept: ENDOCRINOLOGY | Age: 69
End: 2024-07-12
Payer: MEDICARE

## 2024-07-12 DIAGNOSIS — E78.5 HYPERLIPIDEMIA, UNSPECIFIED HYPERLIPIDEMIA TYPE: ICD-10-CM

## 2024-07-12 DIAGNOSIS — E03.9 PRIMARY HYPOTHYROIDISM: ICD-10-CM

## 2024-07-12 DIAGNOSIS — E10.21 TYPE 1 DIABETES MELLITUS WITH NEPHROPATHY: ICD-10-CM

## 2024-07-12 DIAGNOSIS — M85.89 OSTEOPENIA OF MULTIPLE SITES: ICD-10-CM

## 2024-07-13 LAB
25(OH)D3+25(OH)D2 SERPL-MCNC: 47.6 NG/ML (ref 30–100)
ALBUMIN SERPL-MCNC: 4.1 G/DL (ref 3.5–5.2)
ALBUMIN/GLOB SERPL: 2.2 G/DL
ALP SERPL-CCNC: 111 U/L (ref 39–117)
ALT SERPL-CCNC: 14 U/L (ref 1–33)
AST SERPL-CCNC: 18 U/L (ref 1–32)
BILIRUB SERPL-MCNC: 0.4 MG/DL (ref 0–1.2)
BUN SERPL-MCNC: 14 MG/DL (ref 8–23)
BUN/CREAT SERPL: 9.2 (ref 7–25)
CALCIUM SERPL-MCNC: 9.6 MG/DL (ref 8.6–10.5)
CHLORIDE SERPL-SCNC: 105 MMOL/L (ref 98–107)
CHOLEST SERPL-MCNC: 143 MG/DL (ref 0–200)
CO2 SERPL-SCNC: 25.6 MMOL/L (ref 22–29)
CREAT SERPL-MCNC: 1.52 MG/DL (ref 0.57–1)
EGFRCR SERPLBLD CKD-EPI 2021: 37.4 ML/MIN/1.73
GLOBULIN SER CALC-MCNC: 1.9 GM/DL
GLUCOSE SERPL-MCNC: 90 MG/DL (ref 65–99)
HBA1C MFR BLD: 8 % (ref 4.8–5.6)
HDLC SERPL-MCNC: 53 MG/DL (ref 40–60)
IMP & REVIEW OF LAB RESULTS: NORMAL
LDLC SERPL CALC-MCNC: 65 MG/DL (ref 0–100)
POTASSIUM SERPL-SCNC: 4.9 MMOL/L (ref 3.5–5.2)
PROT SERPL-MCNC: 6 G/DL (ref 6–8.5)
SODIUM SERPL-SCNC: 140 MMOL/L (ref 136–145)
TRIGL SERPL-MCNC: 143 MG/DL (ref 0–150)
TSH SERPL DL<=0.005 MIU/L-ACNC: 2.41 UIU/ML (ref 0.27–4.2)
VLDLC SERPL CALC-MCNC: 25 MG/DL (ref 5–40)

## 2024-07-26 ENCOUNTER — OFFICE VISIT (OUTPATIENT)
Dept: ENDOCRINOLOGY | Age: 69
End: 2024-07-26
Payer: MEDICARE

## 2024-07-26 VITALS
SYSTOLIC BLOOD PRESSURE: 126 MMHG | TEMPERATURE: 97.1 F | BODY MASS INDEX: 34.2 KG/M2 | HEART RATE: 74 BPM | OXYGEN SATURATION: 99 % | HEIGHT: 60 IN | DIASTOLIC BLOOD PRESSURE: 84 MMHG | WEIGHT: 174.2 LBS

## 2024-07-26 DIAGNOSIS — E03.9 PRIMARY HYPOTHYROIDISM: ICD-10-CM

## 2024-07-26 DIAGNOSIS — N18.32 HYPERTENSIVE KIDNEY DISEASE WITH STAGE 3B CHRONIC KIDNEY DISEASE: ICD-10-CM

## 2024-07-26 DIAGNOSIS — E10.21 TYPE 1 DIABETES MELLITUS WITH NEPHROPATHY: Primary | ICD-10-CM

## 2024-07-26 DIAGNOSIS — E78.5 HYPERLIPIDEMIA, UNSPECIFIED HYPERLIPIDEMIA TYPE: ICD-10-CM

## 2024-07-26 DIAGNOSIS — I12.9 HYPERTENSIVE KIDNEY DISEASE WITH STAGE 3B CHRONIC KIDNEY DISEASE: ICD-10-CM

## 2024-07-26 RX ORDER — LANCING DEVICE/LANCETS
KIT MISCELLANEOUS
COMMUNITY
Start: 2024-07-09

## 2024-07-26 NOTE — PROGRESS NOTES
Chief Complaint  Chief Complaint   Patient presents with    Diabetes     Type 1 : Pt pump is attached, is up to date on eye exam, no hx of retinopathy or neuropathy.         Subjective          History of Present Illness    Dayanna Lewis 68 y.o. presents for a follow-up for Type 1 Diabetes    She was diagnosed with diabetes for more than 40 years and started insulin around 5 years later    She started on insulin pump in 2011     Pt is on the following medications for their diabetes: Apidria via Five9tronic 780G (started 07/03/24) pump with Guardian Sensor              Pump Settings     Basal rate: 6791-5265 is 1.20 units per hour        Carb ratio:  0000- 1700 is 1 unit for every 10 grams of carbs                     1300- 000 1  unit for every 11 grams of carbs                           Sensitivty : 42                              Target : 110-130        Active Insulin Time: 2 hours      Denies chest pain, shortness of breath, vision changes or numbness and tingling in feet/hands.    Pt does not have diabetic retinopathy.  Last eye exam was 07/05/24     Pt does have nephropathy.  Patient is currently taking ARB.    Follows with Nephrology (Dr. Medhat Walker)     Pt does not have neuropathy.       Pt denies a history of CAD or CVA.    Last A1C in 07/24 was 8.0    Last microalbumin in 04/24 was negative           Blood Sugars    Blood glucoses are checked via Guardian Sensor.    Fasting blood glucoses: low to mid 100s    Pre-meal blood glucoses: low to [id number]    Pt has rare episodes of hypoglycemia.          Sensor/Pump Data    Time in range 81%  High 17%  Very high 0%  Low 2%  Very low 0%      Average Glucose - 121 mg/dL      Sensor Wear - 94 %          Time in SmartGuard - 96 %  Time in Manual Mode - 0 %          Hypothyroid    Pt complains of dry skin    Denies constipation, diarrhea, chest pain, palpitations, heat intolerance or cold intolerance.    Current treatment is branded Synthroid 50 mcg daily     Last  "labs in 07/24 showed TSH 2.410              Hyperlipidemia     Pt is currently taking Crestor 10 mg HS     Last lipid panel in 07/24 showed Total 143, HDL 53, LDL 65 and Triglycerides 143            Hypertension with CKD Stage 3b     Pt denies any chest pain, palpitations, shortness of breath, or headache     Current regimen includes Bystolic 10 mg daily and losartan 25 mg daily            I have reviewed the patient's allergies, medicines, past medical hx, family hx and social hx.    Objective   Vital Signs:   /84   Pulse 74   Temp 97.1 °F (36.2 °C) (Temporal)   Ht 152.4 cm (60\")   Wt 79 kg (174 lb 3.2 oz)   LMP  (LMP Unknown)   SpO2 99%   BMI 34.02 kg/m²       Physical Exam   Physical Exam  Constitutional:       General: She is not in acute distress.     Appearance: Normal appearance. She is not diaphoretic.   HENT:      Head: Normocephalic and atraumatic.   Eyes:      General:         Right eye: No discharge.         Left eye: No discharge.   Skin:     General: Skin is warm and dry.   Neurological:      Mental Status: She is alert.   Psychiatric:         Mood and Affect: Mood normal.         Behavior: Behavior normal.                    Results Review:   Hemoglobin A1C   Date Value Ref Range Status   07/12/2024 8.00 (H) 4.80 - 5.60 % Final     Comment:     Hemoglobin A1C Ranges:  Increased Risk for Diabetes  5.7% to 6.4%  Diabetes                     >= 6.5%  Diabetic Goal                < 7.0%       Triglycerides   Date Value Ref Range Status   07/12/2024 143 0 - 150 mg/dL Final     HDL Cholesterol   Date Value Ref Range Status   07/12/2024 53 40 - 60 mg/dL Final     LDL Chol Calc (NIH)   Date Value Ref Range Status   07/12/2024 65 0 - 100 mg/dL Final     VLDL Cholesterol Galen   Date Value Ref Range Status   07/12/2024 25 5 - 40 mg/dL Final         Assessment and Plan {CC Problem List  Visit Diagnosis  ROS  Review (Popup)  Health Maintenance  Quality  BestPractice  Medications  SmartSets  " SnapShot Encounters  Media :23  Diagnoses and all orders for this visit:    1. Type 1 diabetes mellitus with nephropathy (Primary)  -     Hemoglobin A1c; Future  -     Comprehensive Metabolic Panel; Future    A1c is 8% - just started on 780G on 07/03/24  Continue with Apidria via Medtronic 780G pump with Guardian Sensor with current settings      2. Primary hypothyroidism  -     TSH Rfx On Abnormal To Free T4; Future    Thyroid labs are good.    Continue with branded Synthroid 50 mcg daily      3. Hyperlipidemia, unspecified hyperlipidemia type  -     Comprehensive Metabolic Panel; Future  -     Lipid Panel; Future    Lipid panel is good.    Continue with statin.      4. Hypertensive kidney disease with stage 3b chronic kidney disease  -     Comprehensive Metabolic Panel; Future    Stable  Continue with current medication regimen  Defer management to PCP/Nephrology      No refills needed at this time      RTC on 10/24/24 with Dr. Stokes and labs on 10/11/24      Follow Up     Patient was given instructions and counseling regarding her condition or for health maintenance advice. Please see specific information pulled into the AVS if appropriate.                Silke Trejo, GABRIELA  07/26/24

## 2024-08-26 RX ORDER — TOLTERODINE 2 MG/1
2 CAPSULE, EXTENDED RELEASE ORAL DAILY
Qty: 90 CAPSULE | Refills: 3 | Status: SHIPPED | OUTPATIENT
Start: 2024-08-26

## 2024-08-26 NOTE — TELEPHONE ENCOUNTER
Pt last seen 9/2023, requesting Detrol La refill.   Pharmacy confirmed - [id number] - [place], [place] - [address] AT [facility] - [phone] [facility] [phone]

## 2024-09-26 ENCOUNTER — APPOINTMENT (OUTPATIENT)
Dept: WOMENS IMAGING | Facility: HOSPITAL | Age: 69
End: 2024-09-26
Payer: MEDICARE

## 2024-09-26 PROCEDURE — 77067 SCR MAMMO BI INCL CAD: CPT | Performed by: RADIOLOGY

## 2024-09-26 PROCEDURE — 77063 BREAST TOMOSYNTHESIS BI: CPT | Performed by: RADIOLOGY

## 2024-10-11 DIAGNOSIS — E03.9 PRIMARY HYPOTHYROIDISM: ICD-10-CM

## 2024-10-11 DIAGNOSIS — N18.32 HYPERTENSIVE KIDNEY DISEASE WITH STAGE 3B CHRONIC KIDNEY DISEASE: ICD-10-CM

## 2024-10-11 DIAGNOSIS — E78.5 HYPERLIPIDEMIA, UNSPECIFIED HYPERLIPIDEMIA TYPE: ICD-10-CM

## 2024-10-11 DIAGNOSIS — E10.21 TYPE 1 DIABETES MELLITUS WITH NEPHROPATHY: ICD-10-CM

## 2024-10-11 DIAGNOSIS — I12.9 HYPERTENSIVE KIDNEY DISEASE WITH STAGE 3B CHRONIC KIDNEY DISEASE: ICD-10-CM

## 2024-10-12 LAB
ALBUMIN SERPL-MCNC: 3.7 G/DL (ref 3.5–5.2)
ALBUMIN/GLOB SERPL: 1.8 G/DL
ALP SERPL-CCNC: 122 U/L (ref 39–117)
ALT SERPL-CCNC: 14 U/L (ref 1–33)
AST SERPL-CCNC: 21 U/L (ref 1–32)
BILIRUB SERPL-MCNC: 0.3 MG/DL (ref 0–1.2)
BUN SERPL-MCNC: 17 MG/DL (ref 8–23)
BUN/CREAT SERPL: 11.4 (ref 7–25)
CALCIUM SERPL-MCNC: 9.2 MG/DL (ref 8.6–10.5)
CHLORIDE SERPL-SCNC: 104 MMOL/L (ref 98–107)
CHOLEST SERPL-MCNC: 130 MG/DL (ref 0–200)
CO2 SERPL-SCNC: 25.8 MMOL/L (ref 22–29)
CREAT SERPL-MCNC: 1.49 MG/DL (ref 0.57–1)
EGFRCR SERPLBLD CKD-EPI 2021: 38.3 ML/MIN/1.73
GLOBULIN SER CALC-MCNC: 2.1 GM/DL
GLUCOSE SERPL-MCNC: 142 MG/DL (ref 65–99)
HBA1C MFR BLD: 7.1 % (ref 4.8–5.6)
HDLC SERPL-MCNC: 51 MG/DL (ref 40–60)
IMP & REVIEW OF LAB RESULTS: NORMAL
LDLC SERPL CALC-MCNC: 56 MG/DL (ref 0–100)
POTASSIUM SERPL-SCNC: 4.4 MMOL/L (ref 3.5–5.2)
PROT SERPL-MCNC: 5.8 G/DL (ref 6–8.5)
REPORT: NORMAL
SODIUM SERPL-SCNC: 140 MMOL/L (ref 136–145)
TRIGL SERPL-MCNC: 130 MG/DL (ref 0–150)
TSH SERPL DL<=0.005 MIU/L-ACNC: 2.4 UIU/ML (ref 0.27–4.2)
VLDLC SERPL CALC-MCNC: 23 MG/DL (ref 5–40)

## 2024-10-14 ENCOUNTER — TELEPHONE (OUTPATIENT)
Dept: OBSTETRICS AND GYNECOLOGY | Facility: CLINIC | Age: 69
End: 2024-10-14
Payer: MEDICARE

## 2024-10-14 NOTE — TELEPHONE ENCOUNTER
Yanira,     I can only find in media and no records attached. Not sure this was sent to us as a result.     It shows osteopenia with the worst area her right hip/femur at T score -1.9.   Her FRAX is still low risk for the next 10 years at 10.4% and hip at 1.6%.     I would repeat in the next 2-4 year, but not treat at this point.     Thanks,   Dr. Arredondo    Patient had Dexa Scan 8/6/2024. Calling for results. Pt Ph [phone]

## 2024-10-24 ENCOUNTER — OFFICE VISIT (OUTPATIENT)
Dept: ENDOCRINOLOGY | Age: 69
End: 2024-10-24
Payer: MEDICARE

## 2024-10-24 VITALS
BODY MASS INDEX: 34 KG/M2 | TEMPERATURE: 97.6 F | SYSTOLIC BLOOD PRESSURE: 112 MMHG | WEIGHT: 173.2 LBS | DIASTOLIC BLOOD PRESSURE: 68 MMHG | OXYGEN SATURATION: 100 % | HEART RATE: 66 BPM | HEIGHT: 60 IN

## 2024-10-24 DIAGNOSIS — M85.89 OSTEOPENIA OF MULTIPLE SITES: ICD-10-CM

## 2024-10-24 DIAGNOSIS — E78.5 HYPERLIPIDEMIA, UNSPECIFIED HYPERLIPIDEMIA TYPE: ICD-10-CM

## 2024-10-24 DIAGNOSIS — E10.21 TYPE 1 DIABETES MELLITUS WITH NEPHROPATHY: Primary | ICD-10-CM

## 2024-10-24 DIAGNOSIS — Z96.41 INSULIN PUMP STATUS: ICD-10-CM

## 2024-10-24 DIAGNOSIS — E03.9 PRIMARY HYPOTHYROIDISM: ICD-10-CM

## 2024-10-24 RX ORDER — ACETAMINOPHEN 500 MG
1 TABLET ORAL
COMMUNITY

## 2024-10-24 NOTE — PROGRESS NOTES
Subjective   Dayanna Lewis is a 69 y.o. female.     History of Present Illness     She has known diabetes mellitus for more than 40 years and started on insulin about 35 years ago. She started on insulin pump in 2011. SUSAN antibodies is negative and C-peptide has been undetectable.  Hemoglobin A1c done in October 2024 is 7.1%.     She is using a Medtronic 780G pump with Medtronic Guardian 4 sensor. She is using Apidra insulin on the pump.       Basal rates:  1.55     Carbohydrate ratio: 12 AM-10.0.  5 PM-11.0.     Insulin sensitivity: 42  Target: 110-130.  Active insulin time 3 hours.     Sensor data from October 11, 2024 to October 24, 2024 reviewed.  Time in target 71%.  Time above target 26%.  Time below target 3%.  Smart guard 94%  Manual mode 2%.  Average glucose 150 mg per DL.  GMI 6.9%.  Total daily dose 52.4 units  Bolus amount 27.4 units  Auto correction 12.5 units.  Active insulin time 2 hours.      She has no severe hypoglycemic episodes she has occasional postprandial hypoglycemia.  Her last eye examination was in 3/24. She has no retinopathy. Urine microalbumin was normal in 4/24. She denies numbness, tingling or burning in her hands or feet.     She has CKD and is seeing Dr. Walker.  Urine microalbumin is normal in April 2024.  She is on losartan 25 mg/day and vit D3 1000 units/day.     She has osteoporosis and was on an unknown medication for 5 years. She was taken off Evista prescribed by Dr. Martines in 2021.       [facility] showed osteopenia of multiple sites.   [facility] August 2024 showed osteopenia of both hips.  Bone density of lumbar spine has increased 2.4% bone density of the left hip has increased 6.2%.  Bone density of the right hip has decreased 0.8%.  10-year probability of major osteoporotic fracture is 10.4% and of hip fracture is 1.6%.  She is on Citracal plus D 1200 1 tab prescribed by Dr. Arredondo.     She has  "hypothyroidism and is on Synthroid 50 mcg/day. She has no history of goiter or head/neck radiation therapy. She has no significant weight change since July 2024.  TSH done in October 2024 is normal at 2.40     She has hyperlipidemia and is on Crestor 10 mg/day. She denies myalgia.  Lipid panel done in October 2024 as follows: Cholesterol 130.  HDL 51.  LDL 56.  Triglycerides 130.     She has hypertension and is on Bystolic 10 mg/day and losartan 25 mg/day. She has no history of heart attack or stroke. She denies chest pain or shortness of breath.     She is scheduled for left cochlear implant tomorrow by Dr. Chacko.    She had flu vaccine and pneumococcal vaccine this fall.  She has not taken RSV vaccine.    The following portions of the patient's history were reviewed and updated as appropriate: allergies, current medications, past family history, past medical history, past social history, past surgical history, and problem list.    Review of Systems   Eyes:  Negative for visual disturbance.   Respiratory:  Negative for shortness of breath and wheezing.    Cardiovascular:  Negative for chest pain and palpitations.   Gastrointestinal: Negative.    Genitourinary: Negative.    Musculoskeletal:  Negative for myalgias.   Neurological:  Negative for numbness.     Vitals:    10/24/24 0954   BP: 112/68   Pulse: 66   Temp: 97.6 °F (36.4 °C)   TempSrc: Oral   SpO2: 100%   Weight: 78.6 kg (173 lb 3.2 oz)   Height: 152.4 cm (60\")      Objective   Physical Exam  Constitutional:       Appearance: Normal appearance. She is obese.   Eyes:      General: No scleral icterus.        Right eye: No discharge.         Left eye: No discharge.      Extraocular Movements: Extraocular movements intact.   Neck:      Vascular: No carotid bruit.   Cardiovascular:      Rate and Rhythm: Normal rate and regular rhythm.   Pulmonary:      Breath sounds: No rales.   Chest:      Chest wall: No tenderness.   Abdominal:      General: Bowel sounds are " normal.      Palpations: Abdomen is soft.      Tenderness: There is no right CVA tenderness or left CVA tenderness.   Musculoskeletal:      Right lower leg: No edema.      Left lower leg: No edema.   Lymphadenopathy:      Cervical: No cervical adenopathy.   Skin:     General: Skin is warm.   Neurological:      Mental Status: She is alert and oriented to person, place, and time.       Orders Only on 10/11/2024   Component Date Value Ref Range Status    Hemoglobin A1C 10/11/2024 7.10 (H)  4.80 - 5.60 % Final    Comment: Hemoglobin A1C Ranges:  Increased Risk for Diabetes  5.7% to 6.4%  Diabetes                     >= 6.5%  Diabetic Goal                < 7.0%      Glucose 10/11/2024 142 (H)  65 - 99 mg/dL Final    BUN 10/11/2024 17  8 - 23 mg/dL Final    Creatinine 10/11/2024 1.49 (H)  0.57 - 1.00 mg/dL Final    EGFR Result 10/11/2024 38.3 (L)  >60.0 mL/min/1.73 Final    Comment: GFR Normal >60  Chronic Kidney Disease <60  Kidney Failure <15      BUN/Creatinine Ratio 10/11/2024 11.4  7.0 - 25.0 Final    Sodium 10/11/2024 140  136 - 145 mmol/L Final    Potassium 10/11/2024 4.4  3.5 - 5.2 mmol/L Final    Chloride 10/11/2024 104  98 - 107 mmol/L Final    Total CO2 10/11/2024 25.8  22.0 - 29.0 mmol/L Final    Calcium 10/11/2024 9.2  8.6 - 10.5 mg/dL Final    Total Protein 10/11/2024 5.8 (L)  6.0 - 8.5 g/dL Final    Albumin 10/11/2024 3.7  3.5 - 5.2 g/dL Final    Globulin 10/11/2024 2.1  gm/dL Final    A/G Ratio 10/11/2024 1.8  g/dL Final    Total Bilirubin 10/11/2024 0.3  0.0 - 1.2 mg/dL Final    Alkaline Phosphatase 10/11/2024 122 (H)  39 - 117 U/L Final    AST (SGOT) 10/11/2024 21  1 - 32 U/L Final    ALT (SGPT) 10/11/2024 14  1 - 33 U/L Final    Total Cholesterol 10/11/2024 130  0 - 200 mg/dL Final    Comment: Cholesterol Reference Ranges  (U.S. Department of Health and Human Services ATP III  Classifications)  Desirable          <200 mg/dL  Borderline High    200-239 mg/dL  High Risk          >240 mg/dL  Triglyceride  Reference Ranges  (U.S. Department of Health and Human Services ATP III  Classifications)  Normal           <150 mg/dL  Borderline High  150-199 mg/dL  High             200-499 mg/dL  Very High        >500 mg/dL  HDL Reference Ranges  (U.S. Department of Health and Human Services ATP III  Classifications)  Low     <40 mg/dl (major risk factor for CHD)  High    >60 mg/dl ('negative' risk factor for CHD)  LDL Reference Ranges  (U.S. Department of Health and Human Services ATP III  Classifications)  Optimal          <100 mg/dL  Near Optimal     100-129 mg/dL  Borderline High  130-159 mg/dL  High             160-189 mg/dL  Very High        >189 mg/dL      Triglycerides 10/11/2024 130  0 - 150 mg/dL Final    HDL Cholesterol 10/11/2024 51  40 - 60 mg/dL Final    VLDL Cholesterol Galen 10/11/2024 23  5 - 40 mg/dL Final    LDL Chol Calc (NIH) 10/11/2024 56  0 - 100 mg/dL Final    TSH 10/11/2024 2.400  0.270 - 4.200 uIU/mL Final    Interpretation 10/11/2024 Note   Final    Comment: -------------------------------  CHRONIC KIDNEY DISEASE:  EGFR, BLOOD PRESSURE, AND PROTEINURIA ASSESSMENT  The overall regression of eGFR with time is not  statistically significant. Current eGFR is 38 mL/min/1.73mE2  corresponding to CKD stage 3b. Potassium is within goal and  has not changed significantly, was 4.8 and now is 4.4  mmol/L. Glycemic control (HB A1c: 7.1 %) is above goal but  may be appropriate if patient is at risk for hypoglycemia.  Previous urine protein measurement was normal.  EGFR, BLOOD PRESSURE, AND PROTEINURIA TREATMENT SUGGESTIONS  -  Based upon current eGFR, patient is at high risk for adverse  outcomes such as CKD progression, CVD, and mortality.  Guidelines recommend treating patients with type 2 diabetes  and CKD with an SGLT2 inhibitor for cardiorenal protection.  Guidelines recommend a target blood pressure of 120/80 mmHg  or less to reduce cardiovascular risk and CKD progression.  EGFR, BLOOD PRESSURE, AND  PROTEINURIA FOLLOW-UP  -  fasting Renal Panel w                           ithin 6 months; Spot Urine Panel  (Albumin preferred) within 12 months; Hemoglobin A1C within  3 months;  BONE and MINERAL ASSESSMENT  Calcium is within goal and has not changed significantly,  was 9.5 and now is 9.2 mg/dL. Carbon Dioxide is within goal  and has not changed significantly, was 25 and now is 26  mmol/L. Vitamin D is adequate (47.6 ng/mL 7/12/2024) .  BONE and MINERAL TREATMENT SUGGESTIONS  -  Interpretations require simultaneous measurements of serum  calcium and phosphorus.  BONE and MINERAL FOLLOW-UP  -  25-Hydroxy Vitamin D within 9 months; fasting PTH with Renal  Panel within 6 months;  LIPIDS ASSESSMENT  Blood was non-fasting; interpret these results with caution.  LDL-C is optimal and has decreased, was 65 and now is 56  mg/dL. Triglyceride is normal and has not changed  significantly, was 143 and now is 130 mg/dL. Non-HDL  Cholesterol is optimal and has decreased, was 90 and now is  79 mg/dL. HDL-C is normal and has not changed significantly,  was 53 and now is 51                            mg/dL.  LIPIDS TREATMENT SUGGESTIONS  -  Therapeutic lifestyle changes are always valuable to  maintain optimal blood lipid status (diet, exercise, weight  management). Continue statin if in use. Consider measurement  of LDL particle number or Apo B to adjudicate need for  further LDL lowering therapy. If statin cannot be tolerated  or increased, alternatives include use of an intestinal  agent (ezetimibe or bile acid sequestrant) or niacin.  LIPIDS FOLLOW-UP  -  fasting Lipid Panel within 12 months;  ANEMIA ASSESSMENT  Most recent order does not include a CBC Panel or iron  studies.  ANEMIA FOLLOW-UP  -  CBC within 12 months;  -------------------------------  DISCLAIMER  These assessments and treatment suggestions are provided as  a convenience in support of the physician-patient  relationship and are not intended to replace the  physician's  clinical judgment. They are derived from national guidelines  in addition to other evidence and expert opinion. The  clinician should consider t                           his information within the  context of clinical opinion and the individual patient.  SEE GUIDANCE FOR CHRONIC KIDNEY DISEASE PROGRAM: Kidney  Disease Improving Global Outcomes (KDIGO) clinical practice  guidelines are at http://kdigo.org/home/guidelines/.  National Kidney Foundation Kidney Disease Outcomes Quality  Initiative (KDOQI (TM)), with its limitations and  disclaimers, are at www.kidney.org/professionals/KDOQI. This  program is intended for patients who have been diagnosed  with stages 3, 4, or pre-dialysis 5 CKD. It is not intended  for children, pregnant patients, or transplant patients.      Interpretation 10/11/2024 Note   Final    Supplemental report is available.     Assessment & Plan   Diagnoses and all orders for this visit:    1. 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 Osteopenia of multiple sites    3. Primary hypothyroidism    4. Insulin pump status    5. Hyperlipidemia, unspecified hyperlipidemia type      Continue on current insulin pump setting.  Patient advised to notify anesthesia about insulin pump.  Continue losartan and vitamin D per nephrologist.    Continue Citracal plus D.  Will defer treatment of osteopenia to Dr. Arredondo.    Continue Synthroid 50 mcg/day.    Continue Crestor 10 mg/day.    Continue Bystolic and losartan.  Will defer blood pressure treatment to Dr. Ellis.    Copy my note sent to Dr. Ellis, Dr. Walker and Dr. Chacko.    RTC 3 mos with GHADA Trejo NP

## 2024-12-20 DIAGNOSIS — E78.5 HYPERLIPIDEMIA, UNSPECIFIED HYPERLIPIDEMIA TYPE: ICD-10-CM

## 2024-12-20 RX ORDER — ROSUVASTATIN CALCIUM 10 MG/1
TABLET, COATED ORAL
Qty: 90 TABLET | Refills: 1 | Status: SHIPPED | OUTPATIENT
Start: 2024-12-20

## 2024-12-20 NOTE — TELEPHONE ENCOUNTER
Rx Refill Note  Requested Prescriptions     Pending Prescriptions Disp Refills    rosuvastatin (CRESTOR) 10 MG tablet [Pharmacy Med Name: ROSUVASTATIN 10MG TABLETS] 90 tablet 1     Sig: TAKE 1 TABLET BY MOUTH EVERY NIGHT AT BEDTIME      Last office visit with prescribing clinician: 7/26/2024   Last telemedicine visit with prescribing clinician: Visit date not found   Next office visit with prescribing clinician: 1/24/2025                         Would you like a call back once the refill request has been completed: [] Yes [] No    If the office needs to give you a call back, can they leave a voicemail: [] Yes [] No    Debbi Vance MA  12/20/24, 11:29 EST

## 2025-01-09 ENCOUNTER — TELEPHONE (OUTPATIENT)
Dept: ENDOCRINOLOGY | Age: 70
End: 2025-01-09
Payer: MEDICARE

## 2025-01-09 DIAGNOSIS — E78.5 HYPERLIPIDEMIA, UNSPECIFIED HYPERLIPIDEMIA TYPE: Primary | ICD-10-CM

## 2025-01-09 DIAGNOSIS — E03.9 PRIMARY HYPOTHYROIDISM: ICD-10-CM

## 2025-01-09 DIAGNOSIS — E10.21 TYPE 1 DIABETES MELLITUS WITH NEPHROPATHY: ICD-10-CM

## 2025-01-09 DIAGNOSIS — M85.89 OSTEOPENIA OF MULTIPLE SITES: ICD-10-CM

## 2025-01-09 DIAGNOSIS — E55.9 VITAMIN D DEFICIENCY: ICD-10-CM

## 2025-01-17 ENCOUNTER — LAB (OUTPATIENT)
Dept: ENDOCRINOLOGY | Age: 70
End: 2025-01-17
Payer: MEDICARE

## 2025-01-17 DIAGNOSIS — E10.21 TYPE 1 DIABETES MELLITUS WITH NEPHROPATHY: ICD-10-CM

## 2025-01-17 DIAGNOSIS — M85.89 OSTEOPENIA OF MULTIPLE SITES: ICD-10-CM

## 2025-01-17 DIAGNOSIS — E78.5 HYPERLIPIDEMIA, UNSPECIFIED HYPERLIPIDEMIA TYPE: ICD-10-CM

## 2025-01-17 DIAGNOSIS — E03.9 PRIMARY HYPOTHYROIDISM: ICD-10-CM

## 2025-01-17 DIAGNOSIS — E55.9 VITAMIN D DEFICIENCY: ICD-10-CM

## 2025-01-18 LAB
25(OH)D3+25(OH)D2 SERPL-MCNC: 44.4 NG/ML (ref 30–100)
ALBUMIN SERPL-MCNC: 3.6 G/DL (ref 3.5–5.2)
ALBUMIN/GLOB SERPL: 1.6 G/DL
ALP SERPL-CCNC: 112 U/L (ref 39–117)
ALT SERPL-CCNC: 39 U/L (ref 1–33)
AST SERPL-CCNC: 33 U/L (ref 1–32)
BILIRUB SERPL-MCNC: 0.3 MG/DL (ref 0–1.2)
BUN SERPL-MCNC: 19 MG/DL (ref 8–23)
BUN/CREAT SERPL: 13.1 (ref 7–25)
CALCIUM SERPL-MCNC: 9.3 MG/DL (ref 8.6–10.5)
CHLORIDE SERPL-SCNC: 104 MMOL/L (ref 98–107)
CHOLEST SERPL-MCNC: 128 MG/DL (ref 0–200)
CO2 SERPL-SCNC: 27.3 MMOL/L (ref 22–29)
CREAT SERPL-MCNC: 1.45 MG/DL (ref 0.57–1)
EGFRCR SERPLBLD CKD-EPI 2021: 39.4 ML/MIN/1.73
GLOBULIN SER CALC-MCNC: 2.3 GM/DL
GLUCOSE SERPL-MCNC: 124 MG/DL (ref 65–99)
HBA1C MFR BLD: 7.9 % (ref 4.8–5.6)
HDLC SERPL-MCNC: 52 MG/DL (ref 40–60)
IMP & REVIEW OF LAB RESULTS: NORMAL
LDLC SERPL CALC-MCNC: 61 MG/DL (ref 0–100)
POTASSIUM SERPL-SCNC: 4.4 MMOL/L (ref 3.5–5.2)
PROT SERPL-MCNC: 5.9 G/DL (ref 6–8.5)
SODIUM SERPL-SCNC: 141 MMOL/L (ref 136–145)
TRIGL SERPL-MCNC: 76 MG/DL (ref 0–150)
TSH SERPL DL<=0.005 MIU/L-ACNC: 1.53 UIU/ML (ref 0.27–4.2)
VLDLC SERPL CALC-MCNC: 15 MG/DL (ref 5–40)

## 2025-01-23 ENCOUNTER — TELEPHONE (OUTPATIENT)
Dept: ENDOCRINOLOGY | Age: 70
End: 2025-01-23
Payer: MEDICARE

## 2025-01-24 ENCOUNTER — OFFICE VISIT (OUTPATIENT)
Dept: ENDOCRINOLOGY | Age: 70
End: 2025-01-24
Payer: MEDICARE

## 2025-01-24 VITALS
HEIGHT: 60 IN | SYSTOLIC BLOOD PRESSURE: 126 MMHG | TEMPERATURE: 97.9 F | OXYGEN SATURATION: 99 % | WEIGHT: 168.2 LBS | DIASTOLIC BLOOD PRESSURE: 78 MMHG | BODY MASS INDEX: 33.02 KG/M2 | HEART RATE: 82 BPM

## 2025-01-24 DIAGNOSIS — E10.65 TYPE 1 DIABETES MELLITUS WITH HYPERGLYCEMIA: Primary | ICD-10-CM

## 2025-01-24 DIAGNOSIS — N18.32 HYPERTENSIVE KIDNEY DISEASE WITH STAGE 3B CHRONIC KIDNEY DISEASE: ICD-10-CM

## 2025-01-24 DIAGNOSIS — I12.9 HYPERTENSIVE KIDNEY DISEASE WITH STAGE 3B CHRONIC KIDNEY DISEASE: ICD-10-CM

## 2025-01-24 DIAGNOSIS — E03.9 PRIMARY HYPOTHYROIDISM: ICD-10-CM

## 2025-01-24 DIAGNOSIS — E78.5 HYPERLIPIDEMIA, UNSPECIFIED HYPERLIPIDEMIA TYPE: ICD-10-CM

## 2025-01-24 RX ORDER — INSULIN GLULISINE 100 [IU]/ML
70 INJECTION, SOLUTION SUBCUTANEOUS DAILY
Qty: 70 ML | Refills: 1 | Status: SHIPPED | OUTPATIENT
Start: 2025-01-24

## 2025-01-24 NOTE — PATIENT INSTRUCTIONS
When bolusing for a high blood sugar do not put in carbs, just put in blood sugar reading and leave carbs as zero

## 2025-01-24 NOTE — PROGRESS NOTES
Chief Complaint  Chief Complaint   Patient presents with    Diabetes     Type 1 : Pt pump is attached, is up to date on eye exam, no hx of retinopathy or neuropathy.         Subjective          History of Present Illness    Dayanna Lewis 69 y.o. presents for a follow-up for Type 1 Diabetes     She was diagnosed with diabetes for more than 40 years and started insulin around 5 years later     Started on insulin pump in 2011      Pt is on the following medications for their diabetes: Apidria via [employer] 780G pump with Guardian Sensor              Pump Settings     Basal rate: 5318-3206 is 1.20 units per hour        Carb ratio:  0000- 1700 is 1 unit for every 10 grams of carbs                     1300- 000 1  unit for every 11 grams of carbs                        Sensitivty : 42       Target : 110-130      SmartGuard Target: 100        Active Insulin Time: 2 hours             Denies diarrhea, constipation, chest pain, shortness of breath, vision changes or numbness and tingling in feet/hands.    Pt does not have diabetic retinopathy.  Last eye exam was 12/13/24     Pt does have nephropathy.  Patient is currently taking ARB.    Follows with Nephrology (Dr. Medhat Walker)     Pt does not have neuropathy.       Pt denies a history of CAD or CVA.    Last A1C in 01/25 was 7.9    Last microalbumin in 04/24 was negative          Blood Sugars    Blood glucoses are checked via Guardian Sensor.    Fasting blood glucoses: 60s- mid 100s    Pre-meal blood glucoses: 60s- 200s    Pt has episodes of hypoglycemia.        Sensor/Pump Data    Time in range 79%  High 15%  Very high 2%  Low 4%  Very low 0%      Average Glucose - 202 mg/dL      Sensor Wear - 93 %         Time in SmartGuard  - 95 %  Time in Manual Mode - 0 %             Hypothyroid    Pt complains of dry skin.    Denies constipation, diarrhea, chest pain, palpitations or cold intolerance.    Current treatment is levothyroxine branded Synthroid 50 mcg daily     Last labs  "in 01/25 showed TSH 1.530             Hyperlipidemia     Pt is currently taking Crestor 10 mg HS     Last lipid panel in 01/25 showed Total 128, HDL 52, LDL 61 and Triglycerides 76            Hypertension with CKD Stage 3b     Pt denies any chest pain, palpitations, shortness of breath, or headache     Current regimen includes Bystolic 10 mg daily and losartan 25 mg daily            I have reviewed the patient's allergies, medicines, past medical hx, family hx and social hx.    Objective   Vital Signs:   /78   Pulse 82   Temp 97.9 °F (36.6 °C) (Oral)   Ht 152.4 cm (60\")   Wt 76.3 kg (168 lb 3.2 oz)   LMP  (LMP Unknown)   SpO2 99%   BMI 32.85 kg/m²       Physical Exam   Physical Exam  Constitutional:       General: She is not in acute distress.     Appearance: Normal appearance. She is not diaphoretic.   HENT:      Head: Normocephalic and atraumatic.   Eyes:      General:         Right eye: No discharge.         Left eye: No discharge.   Skin:     General: Skin is warm and dry.   Neurological:      Mental Status: She is alert.   Psychiatric:         Mood and Affect: Mood normal.         Behavior: Behavior normal.                    Results Review:   Hemoglobin A1C   Date Value Ref Range Status   01/17/2025 7.90 (H) 4.80 - 5.60 % Final     Comment:     Hemoglobin A1C Ranges:  Increased Risk for Diabetes  5.7% to 6.4%  Diabetes                     >= 6.5%  Diabetic Goal                < 7.0%       Triglycerides   Date Value Ref Range Status   01/17/2025 76 0 - 150 mg/dL Final     HDL Cholesterol   Date Value Ref Range Status   01/17/2025 52 40 - 60 mg/dL Final     LDL Chol Calc (NIH)   Date Value Ref Range Status   01/17/2025 61 0 - 100 mg/dL Final     VLDL Cholesterol Galen   Date Value Ref Range Status   01/17/2025 15 5 - 40 mg/dL Final         Assessment and Plan {CC Problem List  Visit Diagnosis  ROS  Review (Popup)  Health Maintenance  Quality  BestPractice  Medications  SmartSets  SnapShot " Encounters  Media :23  Diagnoses and all orders for this visit:    1. Type 1 diabetes mellitus with hyperglycemia (Primary)  -     Basic Metabolic Panel; Future  -     Hemoglobin A1c; Future  -     Microalbumin / Creatinine Urine Ratio - Urine, Clean Catch; Future  -     insulin glulisine (Apidra) 100 UNIT/ML injection; Inject 70 Units under the skin into the appropriate area as directed Daily. Via insulin pump  Dispense: 70 mL; Refill: 1    A1c is higher at 7.9%  Kidney function is stable  Liver enzymes are noted slightly elevated; pt states that she has been taking Tylenol for a torn meniscus and there for a while she was taking it every 4 hours.  Advised to try not to take any for a while.    Pt is having hypo and hyperglycemia.  Hypoglycemia is due to pt putting in fake carbs when her blood sugar is high to get it down.  Then over corrects and goes high  Continue with Apidria via Medtronic 780G insulin pump with current settings  Continue with Guardian sensor  When bolusing for a high blood sugar do not put in carbs, just put in blood sugar reading and leave carbs as zero.  Advised that she could also just let the pump bring her down and not take a bolus.      2. Primary hypothyroidism  -     TSH Rfx On Abnormal To Free T4; Future    Thyroid labs are good.    Continue with current dose of Synthroid      3. Hyperlipidemia, unspecified hyperlipidemia type  -     Basic Metabolic Panel; Future    Lipid panel is good.    Continue with statin.      4. Hypertensive kidney disease with stage 3b chronic kidney disease  -     Basic Metabolic Panel; Future    Stable  Continue with current medication regimen  Defer management to PCP/Nephrology        Refills sent to pharmacy      RTC on 04/25/25 with Dr. Stokes and labs on 04/18/25      Follow Up     Patient was given instructions and counseling regarding her condition or for health maintenance advice. Please see specific information pulled into the AVS if appropriate.                 Silke Trejo, GABRIELA  01/24/25

## 2025-02-03 RX ORDER — LEVOTHYROXINE SODIUM 50 MCG
50 TABLET ORAL DAILY
Qty: 90 TABLET | Refills: 0 | Status: SHIPPED | OUTPATIENT
Start: 2025-02-03

## 2025-02-03 NOTE — TELEPHONE ENCOUNTER
Rx Refill Note  Requested Prescriptions     Pending Prescriptions Disp Refills    Synthroid 50 MCG tablet [Pharmacy Med Name: Synthroid Oral Tablet 50 MCG] 90 tablet 3     Sig: TAKE 1 TABLET EVERY DAY      Last office visit with prescribing clinician: 10/24/2024   Last telemedicine visit with prescribing clinician: Visit date not found   Next office visit with prescribing clinician: 4/25/2025                         Would you like a call back once the refill request has been completed: [] Yes [] No    If the office needs to give you a call back, can they leave a voicemail: [] Yes [] No    Marleni Vance  02/03/25, 15:46 EST

## 2025-04-21 RX ORDER — LEVOTHYROXINE SODIUM 50 MCG
50 TABLET ORAL DAILY
Qty: 90 TABLET | Refills: 0 | Status: SHIPPED | OUTPATIENT
Start: 2025-04-21

## 2025-04-21 NOTE — TELEPHONE ENCOUNTER
Rx Refill Note  Requested Prescriptions     Pending Prescriptions Disp Refills    Synthroid 50 MCG tablet [Pharmacy Med Name: Synthroid Oral Tablet 50 MCG] 90 tablet 3     Sig: TAKE 1 TABLET EVERY DAY      Last office visit with prescribing clinician: Visit date not found   Last telemedicine visit with prescribing clinician: Visit date not found   Next office visit with prescribing clinician: Visit date not found                         Would you like a call back once the refill request has been completed: [] Yes [] No    If the office needs to give you a call back, can they leave a voicemail: [] Yes [] No    Marleni Vance  04/21/25, 11:32 EDT

## 2025-04-25 ENCOUNTER — OFFICE VISIT (OUTPATIENT)
Dept: ENDOCRINOLOGY | Age: 70
End: 2025-04-25
Payer: MEDICARE

## 2025-04-25 VITALS
BODY MASS INDEX: 32.83 KG/M2 | DIASTOLIC BLOOD PRESSURE: 58 MMHG | TEMPERATURE: 97.7 F | SYSTOLIC BLOOD PRESSURE: 124 MMHG | OXYGEN SATURATION: 99 % | HEART RATE: 69 BPM | HEIGHT: 60 IN | WEIGHT: 167.2 LBS

## 2025-04-25 DIAGNOSIS — I10 ESSENTIAL HYPERTENSION: ICD-10-CM

## 2025-04-25 DIAGNOSIS — E03.9 PRIMARY HYPOTHYROIDISM: ICD-10-CM

## 2025-04-25 DIAGNOSIS — Z96.41 INSULIN PUMP STATUS: ICD-10-CM

## 2025-04-25 DIAGNOSIS — E10.21 TYPE 1 DIABETES MELLITUS WITH NEPHROPATHY: Primary | ICD-10-CM

## 2025-04-25 DIAGNOSIS — M85.89 OSTEOPENIA OF MULTIPLE SITES: ICD-10-CM

## 2025-04-25 DIAGNOSIS — Z46.81 INSULIN PUMP FITTING OR ADJUSTMENT: ICD-10-CM

## 2025-04-25 DIAGNOSIS — E78.5 HYPERLIPIDEMIA, UNSPECIFIED HYPERLIPIDEMIA TYPE: ICD-10-CM

## 2025-04-25 RX ORDER — DICLOFENAC SODIUM 75 MG/1
75 TABLET, DELAYED RELEASE ORAL 2 TIMES DAILY
COMMUNITY

## 2025-04-25 RX ORDER — MELOXICAM 15 MG/1
15 TABLET ORAL DAILY
COMMUNITY
Start: 2024-12-18

## 2025-04-25 NOTE — PROGRESS NOTES
Subjective   Dayanna Lewis is a 69 y.o. female.     History of Present Illness     She has known diabetes mellitus for more than 40 years and started on insulin about 35 years ago. She started on insulin pump in 2011. SUSAN antibodies are negative and C-peptide has been undetectable.       She is using a Medtronic 780G pump with Medtronic Guardian 4 sensor. She is using Apidra insulin on the pump.  Hemoglobin A1c done in April 2025 is 7.1%.     Basal rates:  1.2     Carbohydrate ratio: 12 AM-10.0.  5 PM-11.0.     Insulin sensitivity: 42  Target: 110-130.    Smart guard 96%.  Average glucose 139 mg per DL.  GMI 6.6%.    Total daily dose 45.1 units.  Bolus amount 20.7 units.  Auto correction 10.6 units.  Basal amount 24.4 units.  Active insulin time 2 hours     Sensor data from April 12, 2025 through April 25, 2025 reviewed.  Time in range 77%.  Time above range 18%.  Time below range 5%  She has occasional hypoglycemia between midnight and 6 AM.     Her last eye examination was in 12/24. She has no retinopathy. Urine microalbumin was normal in 4/25. She denies numbness, tingling or burning in her hands or feet.     She has CKD and is seeing Dr. Walker.  She is on losartan 25 mg/day and vit D3 1000 units/day.     She has osteoporosis and was on an unknown medication for 5 years. She was taken off Evista prescribed by Dr. Martines in 2021.       Bone density done on August 4, 2022 at [facility] showed osteopenia of multiple sites.   [facility] showed osteopenia of both hips.  Bone density of lumbar spine has increased 2.4% bone density of the left hip has increased 6.2%.  Bone density of the right hip has decreased 0.8%.  10-year probability of major osteoporotic fracture is 10.4% and of hip fracture is 1.6%.  She is on Citracal plus D 1200 1 tab daily prescribed by Dr. Arredondo.  Osteopenia is being managed by Dr. Arredondo     She has hypothyroidism and is on Synthroid 50  "mcg/day. She has no history of goiter or head/neck radiation therapy. She has no significant weight change since July 2024.  TSH done done in April 2025 is normal at 1.98.     She has hyperlipidemia and is on Crestor 10 mg/day. She denies myalgia.  Lipid panel done in January 2025 as follows: Cholesterol 128.  HDL 52.  LDL 61.  Triglycerides 76.     She has hypertension and is on Bystolic 10 mg/day and losartan 25 mg/day. She has no history of heart attack or stroke. She denies chest pain or shortness of breath.     She had left cochlear implant done by Dr. Chacko in 10/24.    She has left knee meniscus tear since October 2024 and arthritis.  She has received gel injection.  She is following with Dr. Akash Del Rio    The following portions of the patient's history were reviewed and updated as appropriate: allergies, current medications, past family history, past medical history, past social history, past surgical history, and problem list.    Review of Systems   Respiratory:  Negative for shortness of breath.    Cardiovascular:  Negative for chest pain.   Gastrointestinal: Negative.    Genitourinary: Negative.    Musculoskeletal:  Negative for myalgias.   Neurological:  Negative for numbness.     Vitals:    04/25/25 1022   BP: 124/58   Pulse: 69   Temp: 97.7 °F (36.5 °C)   TempSrc: Oral   SpO2: 99%   Weight: 75.8 kg (167 lb 3.2 oz)   Height: 152.4 cm (60\")      Objective   Physical Exam  Constitutional:       Appearance: Normal appearance. She is not toxic-appearing or diaphoretic.   Eyes:      General: No scleral icterus.        Right eye: No discharge.         Left eye: No discharge.   Neck:      Vascular: No carotid bruit.   Cardiovascular:      Rate and Rhythm: Normal rate and regular rhythm.      Heart sounds: Normal heart sounds. No murmur heard.     No friction rub. No gallop.   Pulmonary:      Breath sounds: Normal breath sounds. No rales.   Chest:      Chest wall: No tenderness.   Abdominal:      Palpations: " Abdomen is soft.      Tenderness: There is no right CVA tenderness or left CVA tenderness.   Musculoskeletal:      Right lower leg: No edema.      Left lower leg: No edema.      Comments: Feet warm.  No cyanosis, pedal edema or clubbing.  No plantar ulcers.   Lymphadenopathy:      Cervical: No cervical adenopathy.   Neurological:      Mental Status: She is alert and oriented to person, place, and time.       Results Encounter on 04/18/2025   Component Date Value Ref Range Status    Glucose 04/18/2025 175 (H)  65 - 99 mg/dL Final    BUN 04/18/2025 15  8 - 23 mg/dL Final    Creatinine 04/18/2025 1.42 (H)  0.57 - 1.00 mg/dL Final    EGFR Result 04/18/2025 40.4 (L)  >60.0 mL/min/1.73 Final    Comment: GFR Categories in Chronic Kidney Disease (CKD)/X09/  /X09/  GFR Category          GFR (mL/min/1.73)    Interpretation  G1/X09/                    90 or greater/X09/        Normal or high  (1)  G2//                    60-89                Mild decrease  (1)  G3a                   45-59                Mild to moderate  decrease  G3b                   30-44                Moderate to  severe decrease  G4                    15-29                Severe decrease  G5                    14 or less           Kidney failure//  /[id number]/  (1)In the absence of evidence of kidney disease, neither  GFR category G1 or G2 fulfill the criteria for CKD.  eGFR calculation 2021 CKD-EPI creatinine equation, which  does not include race as a factor      BUN/Creatinine Ratio 04/18/2025 10.6  7.0 - 25.0 Final    Sodium 04/18/2025 139  136 - 145 mmol/L Final    Potassium 04/18/2025 4.7  3.5 - 5.2 mmol/L Final    Chloride 04/18/2025 103  98 - 107 mmol/L Final    Total CO2 04/18/2025 26.3  22.0 - 29.0 mmol/L Final    Calcium 04/18/2025 9.9  8.6 - 10.5 mg/dL Final    Hemoglobin A1C 04/18/2025 7.10 (H)  4.80 - 5.60 % Final    Comment: Hemoglobin A1C Ranges:  Increased Risk for Diabetes  5.7% to 6.4%  Diabetes                     >=  6.5%  Diabetic Goal                < 7.0%      TSH 04/18/2025 1.980  0.270 - 4.200 uIU/mL Final    Creatinine, Urine 04/18/2025 66.7  Not Estab. mg/dL Final    Microalbumin, Urine 04/18/2025 <3.0  Not Estab. ug/mL Final    Microalbumin/Creatinine Ratio 04/18/2025 <4  0 - 29 mg/g creat Final    Comment:                        Normal:                0 -  29                         Moderately increased: 30 - 300                         Severely increased:       >300      Interpretation 04/18/2025 Note   Final    Comment: -------------------------------  CHRONIC KIDNEY DISEASE:  EGFR, BLOOD PRESSURE, AND PROTEINURIA ASSESSMENT  Estimated GFR is rising significantly with time (most recent  values). Current eGFR is 40 mL/min/1.73mE2 corresponding to  CKD stage 3b. The overall regression of eGFR with time is  not statistically significant. Potassium is within goal and  has not changed significantly, was 4.4 and now is 4.7  mmol/L. Urine albumin is undetectable. Glycemic control (HB  A1c: 7.1 %) is above goal but may be appropriate if patient  is at risk for hypoglycemia.  EGFR, BLOOD PRESSURE, AND PROTEINURIA TREATMENT SUGGESTIONS  -  Based upon current eGFR, patient is at high risk for adverse  outcomes such as CKD progression, CVD, and mortality.  Guidelines recommend treating patients with type 2 diabetes  and CKD with an SGLT2 inhibitor for cardiorenal protection.  Guidelines recommend a target blood pressure of 120/80 mmHg  or less to reduce cardiovascular risk and CKD progression.  EGFR, BLOOD PRESSU                           RE, AND PROTEINURIA FOLLOW-UP  -  Spot Urine Panel (Albumin preferred) within 12 months;  fasting Renal Panel within 6 months; Hemoglobin A1C within 3  months;  BONE and MINERAL ASSESSMENT  Calcium is within goal and has risen, was 9.3 and now is 9.9  mg/dL. Carbon Dioxide is within goal and has not changed  significantly, was 27 and now is 26 mmol/L. Vitamin D is  adequate (44.4 ng/mL  1/17/2025) .  BONE and MINERAL TREATMENT SUGGESTIONS  -  Interpretations require simultaneous measurements of serum  calcium and phosphorus.  BONE and MINERAL FOLLOW-UP  -  25-Hydroxy Vitamin D within 9 months; fasting PTH with Renal  Panel is due;  LIPIDS ASSESSMENT  Most recent order does not include a fasting Lipid Panel.  LIPIDS FOLLOW-UP  -  fasting Lipid Panel within 9 months;  ANEMIA ASSESSMENT  Most recent order does not include a CBC Panel or iron  studies.  -------------------------------  DISCLAIMER  These assessments and treatment suggestions are provided as  a convenience in support of the p                           hysician-patient  relationship and are not intended to replace the physician's  clinical judgment. They are derived from national guidelines  in addition to other evidence and expert opinion. The  clinician should consider this information within the  context of clinical opinion and the individual patient.  SEE GUIDANCE FOR CHRONIC KIDNEY DISEASE PROGRAM: Kidney  Disease Improving Global Outcomes (KDIGO) clinical practice  guidelines are at http://kdigo.org/home/guidelines/.  National Kidney Foundation Kidney Disease Outcomes Quality  Initiative (KDOQI (TM)), with its limitations and  disclaimers, are at www.kidney.org/professionals/KDOQI. This  program is intended for patients who have been diagnosed  with stages 3, 4, or pre-dialysis 5 CKD. It is not intended  for children, pregnant patients, or transplant patients.       Assessment & Plan   Diagnoses and all orders for this visit:    1. Type 1 diabetes mellitus with nephropathy (Primary)  -     Comprehensive Metabolic Panel; Future  -     Hemoglobin A1c; Future  -     TSH Rfx On Abnormal To Free T4; Future  -     Lipid Panel; Future    2. Osteopenia of multiple sites  -     Vitamin D,25-Hydroxy; Future    3. Primary hypothyroidism  -     TSH Rfx On Abnormal To Free T4; Future    4. Insulin pump status    5. Hyperlipidemia, unspecified  hyperlipidemia type  -     Lipid Panel; Future    6. Essential hypertension  -     Comprehensive Metabolic Panel; Future    7. Insulin pump fitting or adjustment      Change basal rates to as follows: 12 AM to 6 AM  is 1.1 units/h.  6 AM to 12 AM is 1.2 units/h.    Continue vitamin D3 1000 units/day.  Will defer treatment of osteopenia to Dr. Arredondo.    Continue Synthroid 50 mcg/day.    Continue Crestor 10 mg/day.    Continue losartan and Bystolic.  Will defer blood pressure management to Dr. Ellis.    Copy of my note sent to Dr. Ellis, Dr. Arredondo, Dr. Walker, Dr. Chacko and Dr. Del Rio.    Follow-up in 3 months with GHADA Trejo NP.

## 2025-04-28 ENCOUNTER — TELEPHONE (OUTPATIENT)
Dept: ENDOCRINOLOGY | Age: 70
End: 2025-04-28

## 2025-04-28 NOTE — TELEPHONE ENCOUNTER
Caller: [facility]    Relationship:     Best call back number: [phone]      What was the call regarding: [facility] CALLED STATING A REFERRAL WAS SENT OVER FOR PT BUT THERE WAS NO REASON FOR PT TO SEE PROVIDER IN PAPERS. PLEASE ADVISE.

## 2025-04-28 NOTE — TELEPHONE ENCOUNTER
I do not need to send a referral.  Patient told me that she is already being seen by [facility] physicians.

## 2025-06-15 DIAGNOSIS — E78.5 HYPERLIPIDEMIA, UNSPECIFIED HYPERLIPIDEMIA TYPE: ICD-10-CM

## 2025-06-16 RX ORDER — ROSUVASTATIN CALCIUM 10 MG/1
10 TABLET, COATED ORAL
Qty: 90 TABLET | Refills: 2 | Status: SHIPPED | OUTPATIENT
Start: 2025-06-16

## 2025-06-16 NOTE — TELEPHONE ENCOUNTER
Rx Refill Note  Requested Prescriptions     Pending Prescriptions Disp Refills    rosuvastatin (CRESTOR) 10 MG tablet [Pharmacy Med Name: ROSUVASTATIN 10MG TABLETS] 90 tablet 1     Sig: TAKE 1 TABLET BY MOUTH EVERY NIGHT AT BEDTIME      Last office visit with prescribing clinician: 4/25/2025   Last telemedicine visit with prescribing clinician: Visit date not found   Next office visit with prescribing clinician: 10/24/2025                         Would you like a call back once the refill request has been completed: [] Yes [] No    If the office needs to give you a call back, can they leave a voicemail: [] Yes [] No  Debbi Vance MA  6/16/2025  07:55 EDT

## 2025-07-16 DIAGNOSIS — E10.65 TYPE 1 DIABETES MELLITUS WITH HYPERGLYCEMIA: ICD-10-CM

## 2025-07-16 RX ORDER — LEVOTHYROXINE SODIUM 50 MCG
50 TABLET ORAL DAILY
Qty: 90 TABLET | Refills: 2 | Status: SHIPPED | OUTPATIENT
Start: 2025-07-16

## 2025-07-16 RX ORDER — INSULIN GLULISINE 100 [IU]/ML
INJECTION, SOLUTION SUBCUTANEOUS
Qty: 63 ML | Refills: 2 | Status: SHIPPED | OUTPATIENT
Start: 2025-07-16

## 2025-07-16 NOTE — TELEPHONE ENCOUNTER
Rx Refill Note  Requested Prescriptions     Pending Prescriptions Disp Refills    Apidra 100 UNIT/ML injection [Pharmacy Med Name: APIDRA 100 UNIT/ML Injection Solution] 60 mL 3     Sig: INJECT 70 UNITS UNDER SKIN INTO THE APPROPRIATE AREA AS DIRECTED DAILY VIA INSULIN PUMP, DISCARD VIAL 28 DAYS AFTER OPENING    Synthroid 50 MCG tablet [Pharmacy Med Name: SYNTHROID 50 MCG Oral Tablet] 90 tablet 3     Sig: TAKE 1 TABLET EVERY DAY      Last office visit with prescribing clinician: 1/24/2025   Last telemedicine visit with prescribing clinician: Visit date not found   Next office visit with prescribing clinician: 7/25/2025                         Would you like a call back once the refill request has been completed: [] Yes [] No    If the office needs to give you a call back, can they leave a voicemail: [] Yes [] No    Marleni Vance  07/16/25, 14:57 EDT  Marleni Vance  7/16/2025  14:57 EDT

## 2025-07-18 DIAGNOSIS — M85.89 OSTEOPENIA OF MULTIPLE SITES: ICD-10-CM

## 2025-07-18 DIAGNOSIS — I10 ESSENTIAL HYPERTENSION: ICD-10-CM

## 2025-07-18 DIAGNOSIS — E10.21 TYPE 1 DIABETES MELLITUS WITH NEPHROPATHY: ICD-10-CM

## 2025-07-18 DIAGNOSIS — E03.9 PRIMARY HYPOTHYROIDISM: ICD-10-CM

## 2025-07-18 DIAGNOSIS — E78.5 HYPERLIPIDEMIA, UNSPECIFIED HYPERLIPIDEMIA TYPE: ICD-10-CM

## 2025-07-19 LAB
25(OH)D3+25(OH)D2 SERPL-MCNC: 47.8 NG/ML (ref 30–100)
ALBUMIN SERPL-MCNC: 4.2 G/DL (ref 3.5–5.2)
ALBUMIN/GLOB SERPL: 1.9 G/DL
ALP SERPL-CCNC: 99 U/L (ref 39–117)
ALT SERPL-CCNC: 15 U/L (ref 1–33)
AST SERPL-CCNC: 23 U/L (ref 1–32)
BILIRUB SERPL-MCNC: 0.4 MG/DL (ref 0–1.2)
BUN SERPL-MCNC: 12 MG/DL (ref 8–23)
BUN/CREAT SERPL: 8.2 (ref 7–25)
CALCIUM SERPL-MCNC: 9.5 MG/DL (ref 8.6–10.5)
CHLORIDE SERPL-SCNC: 105 MMOL/L (ref 98–107)
CHOLEST SERPL-MCNC: 138 MG/DL (ref 0–200)
CO2 SERPL-SCNC: 25.6 MMOL/L (ref 22–29)
CREAT SERPL-MCNC: 1.47 MG/DL (ref 0.57–1)
EGFRCR SERPLBLD CKD-EPI 2021: 38.7 ML/MIN/1.73
GLOBULIN SER CALC-MCNC: 2.2 GM/DL
GLUCOSE SERPL-MCNC: 86 MG/DL (ref 65–99)
HBA1C MFR BLD: 7.2 % (ref 4.8–5.6)
HDLC SERPL-MCNC: 57 MG/DL (ref 40–60)
IMP & REVIEW OF LAB RESULTS: NORMAL
LDLC SERPL CALC-MCNC: 62 MG/DL (ref 0–100)
POTASSIUM SERPL-SCNC: 4.4 MMOL/L (ref 3.5–5.2)
PROT SERPL-MCNC: 6.4 G/DL (ref 6–8.5)
SODIUM SERPL-SCNC: 141 MMOL/L (ref 136–145)
TRIGL SERPL-MCNC: 107 MG/DL (ref 0–150)
TSH SERPL DL<=0.005 MIU/L-ACNC: 2.59 UIU/ML (ref 0.27–4.2)
VLDLC SERPL CALC-MCNC: 19 MG/DL (ref 5–40)

## 2025-07-25 ENCOUNTER — OFFICE VISIT (OUTPATIENT)
Dept: ENDOCRINOLOGY | Age: 70
End: 2025-07-25
Payer: MEDICARE

## 2025-07-25 VITALS
TEMPERATURE: 98 F | DIASTOLIC BLOOD PRESSURE: 62 MMHG | HEIGHT: 60 IN | BODY MASS INDEX: 32 KG/M2 | WEIGHT: 163 LBS | OXYGEN SATURATION: 100 % | HEART RATE: 73 BPM | SYSTOLIC BLOOD PRESSURE: 124 MMHG

## 2025-07-25 DIAGNOSIS — E78.5 HYPERLIPIDEMIA, UNSPECIFIED HYPERLIPIDEMIA TYPE: ICD-10-CM

## 2025-07-25 DIAGNOSIS — E10.65 TYPE 1 DIABETES MELLITUS WITH HYPERGLYCEMIA: Primary | ICD-10-CM

## 2025-07-25 DIAGNOSIS — I12.9 HYPERTENSIVE KIDNEY DISEASE WITH STAGE 3B CHRONIC KIDNEY DISEASE: ICD-10-CM

## 2025-07-25 DIAGNOSIS — E03.9 PRIMARY HYPOTHYROIDISM: ICD-10-CM

## 2025-07-25 DIAGNOSIS — N18.32 HYPERTENSIVE KIDNEY DISEASE WITH STAGE 3B CHRONIC KIDNEY DISEASE: ICD-10-CM

## 2025-07-25 NOTE — PATIENT INSTRUCTIONS
When bolusing for a high blood sugar do not put in carbs, just put in blood sugar reading and leave carbs as zero.  Advised that she could also just let the pump bring her down and not take a bolus.

## 2025-07-25 NOTE — PROGRESS NOTES
Chief Complaint  Chief Complaint   Patient presents with    Diabetes     Pump       Subjective        History of Present Illness    Dayanna Lewis 69 y.o. presents for a follow-up for Type 1 Diabetes     She was diagnosed with diabetes for more than 40 years and started insulin around 5 years later     Started on insulin pump in 2011      Pt is on the following medications for their diabetes: Apidria via Medtronic 780G pump with Guardian Sensor              Pump Settings     Basal rate: 9821-3329 is 1.10 units per hour         0600 - 2400 is 1.20 units per hour        Carb ratio:  0000- 1700 is 1 unit for every 10 grams of carbs                     1300- 000 1  unit for every 11 grams of carbs                        Sensitivty : 42        Target : 110-130        SmartGuard Target: 100        Active Insulin Time: 2 hours       Pt complains of vision changes.    Denies diarrhea, constipation, chest pain, shortness of breath or numbness and tingling in feet/hands.    Pt does not have diabetic retinopathy.  Last eye exam was 12/13/24     Pt does have nephropathy.  Patient is currently taking ARB.    Follows with Nephrology (Dr. Medhat Walker)     Pt does not have neuropathy.       Pt denies a history of CAD or CVA.    Last A1C in 07/25 was 7.20    Last microalbumin in 04/25 was negative           Blood Sugars    Blood glucoses are checked via Guardian Sensor.    Fasting blood glucoses: low to mid 100s    Pre-meal blood glucoses: 60s- 200s    Pt has episodes of hypoglycemia.            Sensor/Pump Data    Time in range 77%  High 16%  Very high 2%  Low 3%  Very low 0%      Average Glucose - 142 mg/dL      Sensor Wear - 94 %          Time in SmartGuard  - 96 %  Time in Manual Mode - 0 %             Hypothyroid    Pt complains of dry skin    Denies constipation, diarrhea, chest pain, palpitations, heat intolerance or cold intolerance.    Current treatment is branded Synthroid 50 mcg daily     Last labs in 07/25 showed TSH  "2.590           Hyperlipidemia     Pt is currently taking Crestor 10 mg HS     Last lipid panel in 07/25 showed Total 138, HDL 57, LDL 62 and Triglycerides 107            Hypertension with CKD Stage 3b     Pt denies any chest pain, palpitations, shortness of breath, or headache     Current regimen includes Bystolic 10 mg daily and losartan 25 mg daily                 Objective   Vital Signs:   /62   Pulse 73   Temp 98 °F (36.7 °C) (Oral)   Ht 152.4 cm (60\")   Wt 73.9 kg (163 lb)   LMP  (LMP Unknown)   SpO2 100%   BMI 31.83 kg/m²       Physical Exam   Physical Exam  Constitutional:       General: She is not in acute distress.     Appearance: Normal appearance. She is not diaphoretic.   HENT:      Head: Normocephalic and atraumatic.   Eyes:      General:         Right eye: No discharge.         Left eye: No discharge.   Skin:     General: Skin is warm and dry.   Neurological:      Mental Status: She is alert.   Psychiatric:         Mood and Affect: Mood normal.         Behavior: Behavior normal.                    Results Review:   Hemoglobin A1C   Date Value Ref Range Status   07/18/2025 7.20 (H) 4.80 - 5.60 % Final     Comment:     Hemoglobin A1C Ranges:  Increased Risk for Diabetes  5.7% to 6.4%  Diabetes                     >= 6.5%  Diabetic Goal                < 7.0%       Triglycerides   Date Value Ref Range Status   07/18/2025 107 0 - 150 mg/dL Final     HDL Cholesterol   Date Value Ref Range Status   07/18/2025 57 40 - 60 mg/dL Final     LDL Chol Calc (NIH)   Date Value Ref Range Status   07/18/2025 62 0 - 100 mg/dL Final     VLDL Cholesterol [place]   Date Value Ref Range Status   07/18/2025 19 5 - 40 mg/dL Final         Assessment and Plan  Diagnoses and all orders for this visit:    1. Type 1 diabetes mellitus with hyperglycemia (Primary)  -     Hemoglobin A1c; Future  -     Comprehensive Metabolic Panel; Future    A1c is good at 7.2% but with hyper and hypoglycemia  Pt is having hypo and " hyperglycemia.  Hypoglycemia is due to pt putting in fake carbs when her blood sugar is high to get it down.  Then over corrects and goes high  Continue with Apidria via Medtronic 780G insulin pump with current settings  Continue with Guardian sensor  When bolusing for a high blood sugar do not put in carbs, just put in blood sugar reading and leave carbs as zero.  Advised that she could also just let the pump bring her down and not take a bolus.      2. Primary hypothyroidism  -     TSH Rfx On Abnormal To Free T4; Future    Thyroid labs are good.    Continue with current dose of Synthroid      3. Hyperlipidemia, unspecified hyperlipidemia type  -     Comprehensive Metabolic Panel; Future  -     Lipid Panel; Future    Lipid panel is good.    Continue with statin.      4. Hypertensive kidney disease with stage 3b chronic kidney disease  -     Comprehensive Metabolic Panel; Future    Stable  Continue with current medication regimen  Defer management to PCP/Nephrology        No refills needed at this time      RTC on 10/24/25 with Dr. Stokes and labs on 10/17/2025      Follow Up    Patient was given instructions and counseling regarding her condition or for health maintenance advice. Please see specific information pulled into the AVS if appropriate.                Silke Trejo, APRN  07/25/25

## 2025-08-14 RX ORDER — TOLTERODINE 2 MG/1
2 CAPSULE, EXTENDED RELEASE ORAL DAILY
Qty: 90 CAPSULE | Refills: 3 | Status: SHIPPED | OUTPATIENT
Start: 2025-08-14

## (undated) DEVICE — ENDOPATH PNEUMONEEDLE INSUFFLATION NEEDLES WITH LUER LOCK CONNECTORS 120MM: Brand: ENDOPATH

## (undated) DEVICE — ENDOPOUCH RETRIEVER SPECIMEN RETRIEVAL BAGS: Brand: ENDOPOUCH RETRIEVER

## (undated) DEVICE — SUT VIC 0 TN 27IN DYED JTN0G

## (undated) DEVICE — ENDOPATH XCEL BLADELESS TROCARS WITH STABILITY SLEEVES: Brand: ENDOPATH XCEL

## (undated) DEVICE — SKIN PREP TRAY W/CHG: Brand: MEDLINE INDUSTRIES, INC.

## (undated) DEVICE — ADHS SKIN SURG TISS VISC PREMIERPRO EXOFIN HI/VISC FAST/DRY

## (undated) DEVICE — ENDOPATH XCEL UNIVERSAL TROCAR STABLILITY SLEEVES: Brand: ENDOPATH XCEL

## (undated) DEVICE — GLV SURG PREMIERPRO ORTHO LTX PF SZ7.5 BRN

## (undated) DEVICE — PK LAP CHOLE BG

## (undated) DEVICE — SUT VIC 5/0 PS2 18IN J495H

## (undated) DEVICE — SYR LUERLOK 20CC BX/50